# Patient Record
Sex: MALE | Race: ASIAN | NOT HISPANIC OR LATINO | URBAN - METROPOLITAN AREA
[De-identification: names, ages, dates, MRNs, and addresses within clinical notes are randomized per-mention and may not be internally consistent; named-entity substitution may affect disease eponyms.]

---
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
## 2020-01-22 PROCEDURE — 99214 OFFICE O/P EST MOD 30 MIN: CPT

## 2020-02-11 PROCEDURE — 99214 OFFICE O/P EST MOD 30 MIN: CPT

## 2020-03-19 PROCEDURE — 99214 OFFICE O/P EST MOD 30 MIN: CPT

## 2020-03-21 ENCOUNTER — INPATIENT (INPATIENT)
Facility: HOSPITAL | Age: 20
LOS: 11 days | Discharge: HOME | End: 2020-04-02
Attending: PSYCHIATRY & NEUROLOGY | Admitting: HOSPITALIST
Payer: MEDICAID

## 2020-03-21 VITALS
RESPIRATION RATE: 18 BRPM | HEART RATE: 81 BPM | DIASTOLIC BLOOD PRESSURE: 96 MMHG | SYSTOLIC BLOOD PRESSURE: 151 MMHG | OXYGEN SATURATION: 100 % | TEMPERATURE: 98 F

## 2020-03-21 DIAGNOSIS — F20.0 PARANOID SCHIZOPHRENIA: ICD-10-CM

## 2020-03-21 DIAGNOSIS — F48.9 NONPSYCHOTIC MENTAL DISORDER, UNSPECIFIED: ICD-10-CM

## 2020-03-21 LAB
ALBUMIN SERPL ELPH-MCNC: 5.3 G/DL — HIGH (ref 3.3–5)
ALP SERPL-CCNC: 51 U/L — LOW (ref 60–270)
ALT FLD-CCNC: 14 U/L — SIGNIFICANT CHANGE UP (ref 4–41)
ANION GAP SERPL CALC-SCNC: 17 MMO/L — HIGH (ref 7–14)
APAP SERPL-MCNC: < 15 UG/ML — LOW (ref 15–25)
AST SERPL-CCNC: 18 U/L — SIGNIFICANT CHANGE UP (ref 4–40)
BASOPHILS # BLD AUTO: 0.02 K/UL — SIGNIFICANT CHANGE UP (ref 0–0.2)
BASOPHILS NFR BLD AUTO: 0.3 % — SIGNIFICANT CHANGE UP (ref 0–2)
BILIRUB SERPL-MCNC: 0.9 MG/DL — SIGNIFICANT CHANGE UP (ref 0.2–1.2)
BUN SERPL-MCNC: 8 MG/DL — SIGNIFICANT CHANGE UP (ref 7–23)
CALCIUM SERPL-MCNC: 9.9 MG/DL — SIGNIFICANT CHANGE UP (ref 8.4–10.5)
CHLORIDE SERPL-SCNC: 97 MMOL/L — LOW (ref 98–107)
CO2 SERPL-SCNC: 22 MMOL/L — SIGNIFICANT CHANGE UP (ref 22–31)
CREAT SERPL-MCNC: 0.96 MG/DL — SIGNIFICANT CHANGE UP (ref 0.5–1.3)
EOSINOPHIL # BLD AUTO: 0.06 K/UL — SIGNIFICANT CHANGE UP (ref 0–0.5)
EOSINOPHIL NFR BLD AUTO: 0.8 % — SIGNIFICANT CHANGE UP (ref 0–6)
ETHANOL BLD-MCNC: < 10 MG/DL — SIGNIFICANT CHANGE UP
GLUCOSE SERPL-MCNC: 97 MG/DL — SIGNIFICANT CHANGE UP (ref 70–99)
HCT VFR BLD CALC: 49.9 % — SIGNIFICANT CHANGE UP (ref 39–50)
HGB BLD-MCNC: 17.3 G/DL — HIGH (ref 13–17)
IMM GRANULOCYTES NFR BLD AUTO: 0.3 % — SIGNIFICANT CHANGE UP (ref 0–1.5)
LYMPHOCYTES # BLD AUTO: 2.34 K/UL — SIGNIFICANT CHANGE UP (ref 1–3.3)
LYMPHOCYTES # BLD AUTO: 30.4 % — SIGNIFICANT CHANGE UP (ref 13–44)
MCHC RBC-ENTMCNC: 28.5 PG — SIGNIFICANT CHANGE UP (ref 27–34)
MCHC RBC-ENTMCNC: 34.7 % — SIGNIFICANT CHANGE UP (ref 32–36)
MCV RBC AUTO: 82.2 FL — SIGNIFICANT CHANGE UP (ref 80–100)
MONOCYTES # BLD AUTO: 0.73 K/UL — SIGNIFICANT CHANGE UP (ref 0–0.9)
MONOCYTES NFR BLD AUTO: 9.5 % — SIGNIFICANT CHANGE UP (ref 2–14)
NEUTROPHILS # BLD AUTO: 4.52 K/UL — SIGNIFICANT CHANGE UP (ref 1.8–7.4)
NEUTROPHILS NFR BLD AUTO: 58.7 % — SIGNIFICANT CHANGE UP (ref 43–77)
NRBC # FLD: 0 K/UL — SIGNIFICANT CHANGE UP (ref 0–0)
PLATELET # BLD AUTO: 213 K/UL — SIGNIFICANT CHANGE UP (ref 150–400)
PMV BLD: 10.9 FL — SIGNIFICANT CHANGE UP (ref 7–13)
POTASSIUM SERPL-MCNC: 3.7 MMOL/L — SIGNIFICANT CHANGE UP (ref 3.5–5.3)
POTASSIUM SERPL-SCNC: 3.7 MMOL/L — SIGNIFICANT CHANGE UP (ref 3.5–5.3)
PROT SERPL-MCNC: 8.2 G/DL — SIGNIFICANT CHANGE UP (ref 6–8.3)
RBC # BLD: 6.07 M/UL — HIGH (ref 4.2–5.8)
RBC # FLD: 11.8 % — SIGNIFICANT CHANGE UP (ref 10.3–14.5)
SALICYLATES SERPL-MCNC: < 5 MG/DL — LOW (ref 15–30)
SODIUM SERPL-SCNC: 136 MMOL/L — SIGNIFICANT CHANGE UP (ref 135–145)
TSH SERPL-MCNC: 2.58 UIU/ML — SIGNIFICANT CHANGE UP (ref 0.5–4.3)
WBC # BLD: 7.69 K/UL — SIGNIFICANT CHANGE UP (ref 3.8–10.5)
WBC # FLD AUTO: 7.69 K/UL — SIGNIFICANT CHANGE UP (ref 3.8–10.5)

## 2020-03-21 PROCEDURE — 99285 EMERGENCY DEPT VISIT HI MDM: CPT

## 2020-03-21 RX ORDER — ARIPIPRAZOLE 15 MG/1
15 TABLET ORAL DAILY
Refills: 0 | Status: DISCONTINUED | OUTPATIENT
Start: 2020-03-21 | End: 2020-03-24

## 2020-03-21 RX ORDER — LANOLIN ALCOHOL/MO/W.PET/CERES
3 CREAM (GRAM) TOPICAL AT BEDTIME
Refills: 0 | Status: DISCONTINUED | OUTPATIENT
Start: 2020-03-21 | End: 2020-03-25

## 2020-03-21 RX ORDER — CLONAZEPAM 1 MG
0.5 TABLET ORAL
Refills: 0 | Status: DISCONTINUED | OUTPATIENT
Start: 2020-03-21 | End: 2020-03-25

## 2020-03-21 RX ORDER — ARIPIPRAZOLE 15 MG/1
1 TABLET ORAL
Qty: 0 | Refills: 0 | DISCHARGE

## 2020-03-21 RX ADMIN — Medication 0.5 MILLIGRAM(S): at 16:13

## 2020-03-21 RX ADMIN — ARIPIPRAZOLE 15 MILLIGRAM(S): 15 TABLET ORAL at 16:13

## 2020-03-21 NOTE — ED PROVIDER NOTE - NS_EDPROVIDERDISPOUSERTYPE_ED_A_ED
JAZMYNE Santacruz at bedside for initial evaluation.
Pt discharged by provider.
I have personally evaluated and examined the patient. The Attending was available to me as a supervising provider if needed.

## 2020-03-21 NOTE — ED BEHAVIORAL HEALTH ASSESSMENT NOTE - ADDITIONAL DETAILS ALL
acute SI with command AH instructing patient to harm self via cutting throat or shooting self, no hx of SAs or SIB

## 2020-03-21 NOTE — ED ADULT NURSE NOTE - OBJECTIVE STATEMENT
Pt arrived to  c/o of having "bad thoughts". Pt reported that he has urges to harm himself but does not have a plan. Pt changed into  clothing. Personal belongings secured and logged. Evaluation pending.

## 2020-03-21 NOTE — ED BEHAVIORAL HEALTH ASSESSMENT NOTE - DESCRIPTION
Calm, cooperative, in no acute distress    Vital Signs Last 24 Hrs  T(C): 36.8 (21 Mar 2020 11:42), Max: 36.8 (21 Mar 2020 11:42)  T(F): 98.3 (21 Mar 2020 11:42), Max: 98.3 (21 Mar 2020 11:42)  HR: 81 (21 Mar 2020 11:42) (81 - 81)  BP: 151/96 (21 Mar 2020 11:42) (151/96 - 151/96)  BP(mean): --  RR: 18 (21 Mar 2020 11:42) (18 - 18)  SpO2: 100% (21 Mar 2020 11:42) (100% - 100%) hx of liver hemangioma  man, single, no dependents, domiciled at home with family (24 yo brother, 7 yo sister, mom, dad), currently unemployed Calm, cooperative, no agitation, no prns required    Vital Signs Last 24 Hrs  T(C): 36.8 (21 Mar 2020 11:42), Max: 36.8 (21 Mar 2020 11:42)  T(F): 98.3 (21 Mar 2020 11:42), Max: 98.3 (21 Mar 2020 11:42)  HR: 81 (21 Mar 2020 11:42) (81 - 81)  BP: 151/96 (21 Mar 2020 11:42) (151/96 - 151/96)  BP(mean): --  RR: 18 (21 Mar 2020 11:42) (18 - 18)  SpO2: 100% (21 Mar 2020 11:42) (100% - 100%)

## 2020-03-21 NOTE — ED BEHAVIORAL HEALTH ASSESSMENT NOTE - DETAILS
reports acute SI with states thoughts to cut throat with a knife or shoot self risperdal (hyperprolactinemia) depression in brother thyroid problems in mother "to hurt myself" as per patient JOVANY mother Dr. Sonja MANZO mother and EM, NP [place]elle[place]

## 2020-03-21 NOTE — ED BEHAVIORAL HEALTH ASSESSMENT NOTE - SUICIDE RISK FACTORS
Command hallucinations/Psychotic disorder current/past/Alcohol/Substance abuse disorders/Unable to engage in safety planning/Insomnia

## 2020-03-21 NOTE — ED BEHAVIORAL HEALTH ASSESSMENT NOTE - OTHER PAST PSYCHIATRIC HISTORY (INCLUDE DETAILS REGARDING ONSET, COURSE OF ILLNESS, INPATIENT/OUTPATIENT TREATMENT)
Diagnosis of schizophrenia with onset of symptoms in March 2019, in o/p treatment at [facility]. One psych hospitalization at [address] in 4/10-5/3/19. Diagnosis of schizophrenia with onset of symptoms in March 2019, in o/p treatment at [facility], sees a therapist and psychiatrist, Dr. Alonzo. One psych hospitalization at [address] in 4/10-5/3/19.

## 2020-03-21 NOTE — ED BEHAVIORAL HEALTH ASSESSMENT NOTE - SUMMARY
Patient with bizarre behavior and poor reality testing, paranoia. His behavior is affected significantly by his paranoid delusions. He has also made concerning statements about suicide. He requires psychiatric hospitalization for safety, stabilization, medication management as he is unable to care for self.    1. Psychiatric – 9.13 admission to [address] [place] [address]. Abilify 10mg daily, Ativan 2 mg PO q6h PRN agitation 2/2 psychiatric condition.   2. Medical – medically cleared    3. Substance – Follow up Utox, serum toxicology.   4. Therapy – group/milieu therapy. Patient with bizarre behavior and poor reality testing, paranoia. His behavior is affected significantly by his paranoid delusions. He has also made concerning statements about suicide. He requires psychiatric hospitalization for safety, stabilization, medication management as he is unable to care for self.    1. Psychiatric – 9.13 admission to [address] [place] [address]. increase Abilify to 15mg daily for psychosis, Clonazepam 0.5mg BID prn anxiety, Melatonin 3mg HS prn insomnia, Ativan 2 mg PO/I< q6h PRN agitation 2/2 psychiatric condition.   2. Medical – medically cleared    3. Substance – encourage abstinence from cannabis   4. Therapy – group/milieu therapy. 19 year old male, single, domiciled with family, currently unemployed; medical history of liver hemangiomas; psychiatric history significant for Schizophrenia, past psychiatric hospitalization from 4/10-5/3/19 at [address] [place] [address] and currently in outpatient treatment at [facility], no history of suicide attempts, no self-injurious behavior; substance history significant for current cannabis (THC) use - last use one week ago reported; no history of violence/legal problems/arrest was brought in by mother for bizarre behavior and suicidal ideation.      Patient was disorganized with vague speech and appeared suspicious on exam.  He admits to the onset of acute command AH instructing him to hurt himself and admits to active suicidal thoughts.  Patient does appear paranoid on exam and visibly fearful.  He makes gestures to shoot himself or cut his neck but denies intent to act on this.  Patient with negative symptoms present and command AH as stated, denies VH or TH. Patient with bizarre behavior and poor reality testing, paranoia.  His behavior is affected significantly by his paranoid delusions.  He has also made concerning statements about suicide.  He requires psychiatric hospitalization for safety, stabilization, medication management as he is unable to contract for safety in the community.  No indication for constant observation in a locked, supervised setting.    1. Psychiatric – 9.13 voluntary admission to [address] [place] [address]. increase Abilify to 15mg daily for psychosis, Clonazepam 0.5mg BID prn anxiety, Melatonin 3mg HS prn insomnia, Ativan 2 mg PO/I< q6h PRN agitation 2/2 psychiatric condition.   2. Medical – medically cleared    3. Substance – encourage abstinence from cannabis   4. Therapy – group/milieu therapy. 19 year old male, single, domiciled with family, currently unemployed; medical history of liver hemangiomas; psychiatric history significant for Schizophrenia, past psychiatric hospitalization from 4/10-5/3/19 at [address] [place] [address] and currently in outpatient treatment at [facility], no history of suicide attempts, no self-injurious behavior; substance history significant for current cannabis (THC) use - last use one week ago reported; no history of violence/legal problems/arrest was brought in by mother for evaluation due to concerns regarding depression.      Patient was disorganized with vague speech and appeared suspicious on exam.  He admits to the onset of acute command AH instructing him to hurt himself and admits to active suicidal thoughts.  Patient does appear paranoid on exam and visibly fearful.  He makes gestures to shoot himself or cut his neck but denies intent to act on this.  Patient with negative symptoms present and command AH as stated, denies VH or TH.  Patient with bizarre behavior and poor reality testing, paranoia.  His behavior is affected significantly by his paranoid delusions.  He has also made concerning statements about suicide.  He requires psychiatric hospitalization for safety, stabilization, medication management as he is unable to contract for safety in the community.  No indication for constant observation in a locked, supervised setting.    1. Psychiatric – 9.13 voluntary admission to [address] [place] [address]. increase Abilify to 15mg daily for psychosis, Clonazepam 0.5mg BID prn anxiety, Melatonin 3mg HS prn insomnia, Ativan 2 mg PO/I< q6h PRN agitation 2/2 psychiatric condition.   2. Medical – medically cleared    3. Substance – encourage abstinence from cannabis   4. Therapy – group/milieu therapy.
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
## 2020-03-21 NOTE — ED ADULT NURSE REASSESSMENT NOTE - NS ED NURSE REASSESS COMMENT FT1
pt calm & cooperative made aware of admission to [address] emotional reassurance provided safety & comfort measures maintained eval on going.

## 2020-03-21 NOTE — ED BEHAVIORAL HEALTH ASSESSMENT NOTE - DESCRIPTION (FIRST USE, LAST USE, QUANTITY, FREQUENCY, DURATION)
alcohol use in high school, no current use Of note, patient has been smoking cannabis since keily year of high school. He last smoked 1 week ago.

## 2020-03-21 NOTE — ED BEHAVIORAL HEALTH ASSESSMENT NOTE - RISK ASSESSMENT
Patient is judged to be an acute risk to self. He is at moderate acute violence risk. Risk factors include current psychotic episode, substance abuse, paranoia. Protective factors include no known history of arrest, violence, legal problems. He is at moderate acute suicide risk. Risk factors include suicidal statements made to family, substance abuse, current psychotic episode, poor insight into illness/behavior, global insomnia, family history of depression. Protective factors include no prior suicide attempts, no prior self-injurious behavior, supportive family, recent engagement with work/school. Patient is assessed to be an acute risk to self and would benefit from psychiatric hospitalization for safety, stabilization, medication management. Moderate Acute Suicide Risk Patient is judged to be an acute risk to self. He is at low acute violent risk. Risk factors include current psychotic episode, substance abuse, paranoia. Protective factors include no known history of arrest, violence, legal problems. He is at high acute suicide risk. Risk factors include suicidal statements made to family, acute SI with plan and CAH, substance abuse, current psychotic episode, poor insight into illness/behavior, global insomnia, family history of depression. Protective factors include no prior suicide attempts, no prior self-injurious behavior, supportive family, engagement with treatment and meds. Patient is assessed to be an acute risk to self and would benefit from psychiatric hospitalization for safety, stabilization, medication management. High Acute Suicide Risk

## 2020-03-21 NOTE — ED BEHAVIORAL HEALTH ASSESSMENT NOTE - DIFFERENTIAL
Psychosis NOS, r/o substance induced psychosis Psychosis NOS, r/o substance induced psychosis, cannabis abuse

## 2020-03-21 NOTE — ED ADULT TRIAGE NOTE - CHIEF COMPLAINT QUOTE
Patient reports SI, denies plan. Patient states "I keep repeating thoughts in my head over and over." Patient calm and cooperative in triage.  RN made aware.

## 2020-03-21 NOTE — ED BEHAVIORAL HEALTH ASSESSMENT NOTE - ACTIVATING EVENTS/STRESSORS
Triggering events leading to humiliation, shame, and/or despair (e.g. Loss of relationship, financial or health status) (real or anticipated)/Change in provider or treatment (i.e., medications, psychotherapy, milieu)

## 2020-03-21 NOTE — ED PROVIDER NOTE - OBJECTIVE STATEMENT
18 y/o M  hx Schizophrenia   BIBA secondary to feeling sad and  anxious . Admits to medication compliance.   Denies falling, punching or kicking any objects.   Explicitly denies SI/HI/AH/VH.   Denies pain, SOB, fever,  chills, chest/ abdominal discomfort. Denies recent use of alcohol or illicit drugs.  No evidence of physical injures, broken  skin or deformities. 18 y/o M  hx Schizophrenia   BIBA secondary to  "racing suicidal thoughts " . Admits to medication compliance.   Denies falling, punching or kicking any objects.  Denies HI/AH/VH.   Denies pain, SOB, fever,  chills, chest/ abdominal discomfort. Denies recent use of alcohol or illicit drugs.  No evidence of physical injures, broken  skin or deformities.

## 2020-03-21 NOTE — ED BEHAVIORAL HEALTH ASSESSMENT NOTE - THOUGHT PROCESS
Kinetics :  Vancomycin/ zosyn  Day 1    The patient was evaluated for vancomycin / zosyn therapy for worsening CXR.  Will restart the previously tolerated vancomycin regimen of 1gm q 12 hrs and zosyn as q8hr infusions over 4 hrs.  Plan to continue to follow with you.   Illogical/Impaired reasoning/Disorganized/Other

## 2020-03-21 NOTE — ED BEHAVIORAL HEALTH ASSESSMENT NOTE - CURRENT MEDICATION
abilify 10mg daily, clonazepam 0.5mg daily prn abilify 10mg daily, clonazepam 0.5mg daily prn (last dispensed 12/24/19 for #30 pills as per [employer] database)

## 2020-03-21 NOTE — ED BEHAVIORAL HEALTH ASSESSMENT NOTE - NS ED BHA MSE SPEECH VOLUME
I have reviewed and confirmed nurses' notes for patient's medications, allergies, medical history, and surgical history.
Normal

## 2020-03-21 NOTE — ED ADULT NURSE NOTE - CCCP TRG CHIEF CMPLNT
----- Message from Lauryn Jones sent at 2017  1:56 PM CDT -----  Regarding: DENIAL OF MRI / PEER TO PEER REQUIRED - PLS READ MESSAGE  [facility] DENIED THE MRI RIGHT KNEE DUE PATIENT NOT HAVING 6 WEEKS OF PHYSICAL THERAPY.    DOCTOR CAN EITHER DO A PEER TO PEER (DETAILS BELOW) OR PUT ORDER THRU FOR PHYSICAL THERAPY AND WE CAN RESUBMIT TO INSURANCE AFTER THERAPY IF NEED BE. THE DOCTORS OFFICE WILL NEED TO LET THE PATIENT KNOW MRI WAS DENIED AND PHYSICAL THERAPY HAS BEEN ORDERED PER [employer] INSURANCE GUIDELINES.    [id number] CAN BE DONE BY [phone]  ENTER \"94\" FOR [place], THEN \"1\" -  YOU WILL THEN BE CONNECTED TO A PERSON. MEMBER ID: [id number]  THIS PEER TO PEER MUST BE COMPLETED BY END OF DAY, .    LET ME KNOW OUTCOME OF PEER TO PEER OR IF DOCTOR IS DECIDING TO SEND PATIENT TO PHYSICAL THERAPY. THANK YOU.      
Call to patient-  States he hasn't tried physical therapy for his knee.   Is aware that insurance is requiring 6 weeks of PT for the knee before they cover the MRI.   Patient states this is a new insurance for him and he would like to hold off on the PT for now.    Will make provider aware.  
Has patient tried PT in the past for his knee? If not his insurance requires 6 weeks of PT for the knee before they cover the MRI.  If interested please place referral for PT.  We can perform the MRI if concerns are still present after completion of PT.    
suicidal thoughts

## 2020-03-22 PROCEDURE — 99222 1ST HOSP IP/OBS MODERATE 55: CPT

## 2020-03-22 RX ADMIN — ARIPIPRAZOLE 15 MILLIGRAM(S): 15 TABLET ORAL at 09:47

## 2020-03-22 RX ADMIN — Medication 0.5 MILLIGRAM(S): at 04:38

## 2020-03-23 PROCEDURE — 99232 SBSQ HOSP IP/OBS MODERATE 35: CPT

## 2020-03-23 RX ORDER — SENNA PLUS 8.6 MG/1
1 TABLET ORAL AT BEDTIME
Refills: 0 | Status: DISCONTINUED | OUTPATIENT
Start: 2020-03-23 | End: 2020-03-25

## 2020-03-23 RX ADMIN — SENNA PLUS 1 TABLET(S): 8.6 TABLET ORAL at 21:37

## 2020-03-23 RX ADMIN — ARIPIPRAZOLE 15 MILLIGRAM(S): 15 TABLET ORAL at 08:23

## 2020-03-24 LAB
CHOLEST SERPL-MCNC: 128 MG/DL — SIGNIFICANT CHANGE UP (ref 120–199)
HBA1C BLD-MCNC: 5.1 % — SIGNIFICANT CHANGE UP (ref 4–5.6)
HDLC SERPL-MCNC: 40 MG/DL — SIGNIFICANT CHANGE UP (ref 35–55)
LIPID PNL WITH DIRECT LDL SERPL: 82 MG/DL — SIGNIFICANT CHANGE UP
TRIGL SERPL-MCNC: 51 MG/DL — SIGNIFICANT CHANGE UP (ref 10–149)

## 2020-03-24 PROCEDURE — 99232 SBSQ HOSP IP/OBS MODERATE 35: CPT

## 2020-03-24 RX ORDER — ARIPIPRAZOLE 15 MG/1
20 TABLET ORAL DAILY
Refills: 0 | Status: DISCONTINUED | OUTPATIENT
Start: 2020-03-24 | End: 2020-04-02

## 2020-03-24 RX ADMIN — Medication 3 MILLIGRAM(S): at 21:32

## 2020-03-24 RX ADMIN — SENNA PLUS 1 TABLET(S): 8.6 TABLET ORAL at 21:32

## 2020-03-24 RX ADMIN — ARIPIPRAZOLE 15 MILLIGRAM(S): 15 TABLET ORAL at 09:23

## 2020-03-25 PROCEDURE — 99232 SBSQ HOSP IP/OBS MODERATE 35: CPT

## 2020-03-25 RX ORDER — LANOLIN ALCOHOL/MO/W.PET/CERES
5 CREAM (GRAM) TOPICAL AT BEDTIME
Refills: 0 | Status: DISCONTINUED | OUTPATIENT
Start: 2020-03-25 | End: 2020-04-02

## 2020-03-25 RX ORDER — CLONAZEPAM 1 MG
0.5 TABLET ORAL
Refills: 0 | Status: DISCONTINUED | OUTPATIENT
Start: 2020-03-25 | End: 2020-04-01

## 2020-03-25 RX ORDER — MAGNESIUM HYDROXIDE 400 MG/1
30 TABLET, CHEWABLE ORAL
Refills: 0 | Status: DISCONTINUED | OUTPATIENT
Start: 2020-03-25 | End: 2020-04-02

## 2020-03-25 RX ORDER — SENNA PLUS 8.6 MG/1
2 TABLET ORAL AT BEDTIME
Refills: 0 | Status: DISCONTINUED | OUTPATIENT
Start: 2020-03-25 | End: 2020-04-02

## 2020-03-25 RX ADMIN — ARIPIPRAZOLE 20 MILLIGRAM(S): 15 TABLET ORAL at 09:13

## 2020-03-25 RX ADMIN — Medication 5 MILLIGRAM(S): at 21:47

## 2020-03-25 RX ADMIN — SENNA PLUS 2 TABLET(S): 8.6 TABLET ORAL at 21:47

## 2020-03-25 RX ADMIN — MAGNESIUM HYDROXIDE 30 MILLILITER(S): 400 TABLET, CHEWABLE ORAL at 12:34

## 2020-03-26 PROCEDURE — 99232 SBSQ HOSP IP/OBS MODERATE 35: CPT

## 2020-03-26 RX ORDER — BENZTROPINE MESYLATE 1 MG
1 TABLET ORAL ONCE
Refills: 0 | Status: COMPLETED | OUTPATIENT
Start: 2020-03-26 | End: 2020-03-26

## 2020-03-26 RX ORDER — LANOLIN ALCOHOL/MO/W.PET/CERES
3 CREAM (GRAM) TOPICAL ONCE
Refills: 0 | Status: COMPLETED | OUTPATIENT
Start: 2020-03-26 | End: 2020-03-26

## 2020-03-26 RX ADMIN — Medication 3 MILLIGRAM(S): at 01:58

## 2020-03-26 RX ADMIN — Medication 1 MILLIGRAM(S): at 21:18

## 2020-03-26 RX ADMIN — Medication 5 MILLIGRAM(S): at 21:18

## 2020-03-26 RX ADMIN — SENNA PLUS 2 TABLET(S): 8.6 TABLET ORAL at 21:18

## 2020-03-26 RX ADMIN — Medication 5 MILLIGRAM(S): at 12:56

## 2020-03-26 RX ADMIN — ARIPIPRAZOLE 20 MILLIGRAM(S): 15 TABLET ORAL at 12:07

## 2020-03-27 RX ORDER — TRAZODONE HCL 50 MG
50 TABLET ORAL AT BEDTIME
Refills: 0 | Status: DISCONTINUED | OUTPATIENT
Start: 2020-03-27 | End: 2020-04-02

## 2020-03-27 RX ORDER — DIPHENHYDRAMINE HCL 50 MG
50 CAPSULE ORAL ONCE
Refills: 0 | Status: DISCONTINUED | OUTPATIENT
Start: 2020-03-27 | End: 2020-04-02

## 2020-03-27 RX ORDER — ARIPIPRAZOLE 15 MG/1
1 TABLET ORAL
Qty: 30 | Refills: 0
Start: 2020-03-27 | End: 2020-04-25

## 2020-03-27 RX ORDER — HALOPERIDOL DECANOATE 100 MG/ML
5 INJECTION INTRAMUSCULAR ONCE
Refills: 0 | Status: DISCONTINUED | OUTPATIENT
Start: 2020-03-27 | End: 2020-04-02

## 2020-03-27 RX ORDER — HALOPERIDOL DECANOATE 100 MG/ML
5 INJECTION INTRAMUSCULAR EVERY 6 HOURS
Refills: 0 | Status: DISCONTINUED | OUTPATIENT
Start: 2020-03-27 | End: 2020-04-02

## 2020-03-27 RX ORDER — BENZTROPINE MESYLATE 1 MG
1 TABLET ORAL
Refills: 0 | Status: DISCONTINUED | OUTPATIENT
Start: 2020-03-27 | End: 2020-04-02

## 2020-03-27 RX ORDER — DIPHENHYDRAMINE HCL 50 MG
50 CAPSULE ORAL EVERY 4 HOURS
Refills: 0 | Status: DISCONTINUED | OUTPATIENT
Start: 2020-03-27 | End: 2020-04-02

## 2020-03-27 RX ADMIN — HALOPERIDOL DECANOATE 5 MILLIGRAM(S): 100 INJECTION INTRAMUSCULAR at 15:23

## 2020-03-27 RX ADMIN — ARIPIPRAZOLE 20 MILLIGRAM(S): 15 TABLET ORAL at 08:58

## 2020-03-27 RX ADMIN — Medication 50 MILLIGRAM(S): at 15:23

## 2020-03-27 RX ADMIN — Medication 2 MILLIGRAM(S): at 15:23

## 2020-03-27 RX ADMIN — Medication 50 MILLIGRAM(S): at 01:20

## 2020-03-28 RX ADMIN — ARIPIPRAZOLE 20 MILLIGRAM(S): 15 TABLET ORAL at 09:13

## 2020-03-28 RX ADMIN — SENNA PLUS 2 TABLET(S): 8.6 TABLET ORAL at 21:41

## 2020-03-28 RX ADMIN — Medication 5 MILLIGRAM(S): at 21:41

## 2020-03-29 RX ADMIN — ARIPIPRAZOLE 20 MILLIGRAM(S): 15 TABLET ORAL at 09:17

## 2020-03-29 RX ADMIN — HALOPERIDOL DECANOATE 5 MILLIGRAM(S): 100 INJECTION INTRAMUSCULAR at 14:59

## 2020-03-29 RX ADMIN — Medication 50 MILLIGRAM(S): at 03:35

## 2020-03-29 RX ADMIN — Medication 5 MILLIGRAM(S): at 21:20

## 2020-03-29 RX ADMIN — SENNA PLUS 2 TABLET(S): 8.6 TABLET ORAL at 21:20

## 2020-03-29 RX ADMIN — Medication 2 MILLIGRAM(S): at 14:59

## 2020-03-30 PROCEDURE — 99232 SBSQ HOSP IP/OBS MODERATE 35: CPT

## 2020-03-30 RX ADMIN — SENNA PLUS 2 TABLET(S): 8.6 TABLET ORAL at 20:47

## 2020-03-30 RX ADMIN — ARIPIPRAZOLE 20 MILLIGRAM(S): 15 TABLET ORAL at 09:02

## 2020-03-30 RX ADMIN — Medication 5 MILLIGRAM(S): at 20:47

## 2020-03-31 PROCEDURE — 99231 SBSQ HOSP IP/OBS SF/LOW 25: CPT

## 2020-03-31 RX ORDER — ACETAMINOPHEN 500 MG
650 TABLET ORAL EVERY 6 HOURS
Refills: 0 | Status: DISCONTINUED | OUTPATIENT
Start: 2020-03-31 | End: 2020-04-02

## 2020-03-31 RX ADMIN — SENNA PLUS 2 TABLET(S): 8.6 TABLET ORAL at 21:51

## 2020-03-31 RX ADMIN — Medication 650 MILLIGRAM(S): at 12:04

## 2020-03-31 RX ADMIN — Medication 5 MILLIGRAM(S): at 21:51

## 2020-03-31 RX ADMIN — ARIPIPRAZOLE 20 MILLIGRAM(S): 15 TABLET ORAL at 08:24

## 2020-04-01 RX ORDER — CLONAZEPAM 1 MG
0.5 TABLET ORAL
Refills: 0 | Status: DISCONTINUED | OUTPATIENT
Start: 2020-04-01 | End: 2020-04-02

## 2020-04-01 RX ORDER — LANOLIN ALCOHOL/MO/W.PET/CERES
1 CREAM (GRAM) TOPICAL
Qty: 30 | Refills: 0
Start: 2020-04-01 | End: 2020-04-30

## 2020-04-01 RX ORDER — ARIPIPRAZOLE 15 MG/1
1 TABLET ORAL
Qty: 30 | Refills: 0
Start: 2020-04-01 | End: 2020-04-30

## 2020-04-01 RX ORDER — SENNA PLUS 8.6 MG/1
2 TABLET ORAL
Qty: 60 | Refills: 0
Start: 2020-04-01 | End: 2020-04-30

## 2020-04-01 RX ADMIN — Medication 5 MILLIGRAM(S): at 20:35

## 2020-04-01 RX ADMIN — SENNA PLUS 2 TABLET(S): 8.6 TABLET ORAL at 20:35

## 2020-04-02 VITALS — TEMPERATURE: 97 F | SYSTOLIC BLOOD PRESSURE: 136 MMHG | HEART RATE: 87 BPM | DIASTOLIC BLOOD PRESSURE: 85 MMHG

## 2020-04-02 PROCEDURE — 99239 HOSP IP/OBS DSCHRG MGMT >30: CPT

## 2020-04-02 RX ADMIN — ARIPIPRAZOLE 20 MILLIGRAM(S): 15 TABLET ORAL at 08:45

## 2020-04-09 PROCEDURE — 99214 OFFICE O/P EST MOD 30 MIN: CPT

## 2020-04-21 ENCOUNTER — EMERGENCY (EMERGENCY)
Facility: HOSPITAL | Age: 20
LOS: 1 days | Discharge: ANOTHER HOSPITAL | End: 2020-04-21
Admitting: STUDENT IN AN ORGANIZED HEALTH CARE EDUCATION/TRAINING PROGRAM
Payer: MEDICAID

## 2020-04-21 VITALS
RESPIRATION RATE: 18 BRPM | HEART RATE: 100 BPM | TEMPERATURE: 98 F | OXYGEN SATURATION: 100 % | DIASTOLIC BLOOD PRESSURE: 98 MMHG | SYSTOLIC BLOOD PRESSURE: 147 MMHG

## 2020-04-21 VITALS
HEART RATE: 117 BPM | DIASTOLIC BLOOD PRESSURE: 98 MMHG | TEMPERATURE: 98 F | RESPIRATION RATE: 20 BRPM | OXYGEN SATURATION: 99 % | SYSTOLIC BLOOD PRESSURE: 143 MMHG

## 2020-04-21 DIAGNOSIS — F48.9 NONPSYCHOTIC MENTAL DISORDER, UNSPECIFIED: ICD-10-CM

## 2020-04-21 DIAGNOSIS — F20.0 PARANOID SCHIZOPHRENIA: ICD-10-CM

## 2020-04-21 LAB
ALBUMIN SERPL ELPH-MCNC: 5.5 G/DL — HIGH (ref 3.3–5)
ALP SERPL-CCNC: 58 U/L — LOW (ref 60–270)
ALT FLD-CCNC: 109 U/L — HIGH (ref 4–41)
ANION GAP SERPL CALC-SCNC: 17 MMO/L — HIGH (ref 7–14)
APAP SERPL-MCNC: < 15 UG/ML — LOW (ref 15–25)
AST SERPL-CCNC: 32 U/L — SIGNIFICANT CHANGE UP (ref 4–40)
BASOPHILS # BLD AUTO: 0.02 K/UL — SIGNIFICANT CHANGE UP (ref 0–0.2)
BASOPHILS NFR BLD AUTO: 0.2 % — SIGNIFICANT CHANGE UP (ref 0–2)
BILIRUB SERPL-MCNC: 1 MG/DL — SIGNIFICANT CHANGE UP (ref 0.2–1.2)
BUN SERPL-MCNC: 11 MG/DL — SIGNIFICANT CHANGE UP (ref 7–23)
CALCIUM SERPL-MCNC: 10.2 MG/DL — SIGNIFICANT CHANGE UP (ref 8.4–10.5)
CHLORIDE SERPL-SCNC: 96 MMOL/L — LOW (ref 98–107)
CO2 SERPL-SCNC: 25 MMOL/L — SIGNIFICANT CHANGE UP (ref 22–31)
CREAT SERPL-MCNC: 0.98 MG/DL — SIGNIFICANT CHANGE UP (ref 0.5–1.3)
EOSINOPHIL # BLD AUTO: 0.07 K/UL — SIGNIFICANT CHANGE UP (ref 0–0.5)
EOSINOPHIL NFR BLD AUTO: 0.7 % — SIGNIFICANT CHANGE UP (ref 0–6)
ETHANOL BLD-MCNC: < 10 MG/DL — SIGNIFICANT CHANGE UP
GLUCOSE SERPL-MCNC: 100 MG/DL — HIGH (ref 70–99)
HCT VFR BLD CALC: 50.3 % — HIGH (ref 39–50)
HGB BLD-MCNC: 17 G/DL — SIGNIFICANT CHANGE UP (ref 13–17)
IMM GRANULOCYTES NFR BLD AUTO: 0.4 % — SIGNIFICANT CHANGE UP (ref 0–1.5)
LYMPHOCYTES # BLD AUTO: 1.9 K/UL — SIGNIFICANT CHANGE UP (ref 1–3.3)
LYMPHOCYTES # BLD AUTO: 20.1 % — SIGNIFICANT CHANGE UP (ref 13–44)
MCHC RBC-ENTMCNC: 28.8 PG — SIGNIFICANT CHANGE UP (ref 27–34)
MCHC RBC-ENTMCNC: 33.8 % — SIGNIFICANT CHANGE UP (ref 32–36)
MCV RBC AUTO: 85.1 FL — SIGNIFICANT CHANGE UP (ref 80–100)
MONOCYTES # BLD AUTO: 1.04 K/UL — HIGH (ref 0–0.9)
MONOCYTES NFR BLD AUTO: 11 % — SIGNIFICANT CHANGE UP (ref 2–14)
NEUTROPHILS # BLD AUTO: 6.36 K/UL — SIGNIFICANT CHANGE UP (ref 1.8–7.4)
NEUTROPHILS NFR BLD AUTO: 67.6 % — SIGNIFICANT CHANGE UP (ref 43–77)
NRBC # FLD: 0 K/UL — SIGNIFICANT CHANGE UP (ref 0–0)
PLATELET # BLD AUTO: 172 K/UL — SIGNIFICANT CHANGE UP (ref 150–400)
PMV BLD: 11.4 FL — SIGNIFICANT CHANGE UP (ref 7–13)
POTASSIUM SERPL-MCNC: 3.8 MMOL/L — SIGNIFICANT CHANGE UP (ref 3.5–5.3)
POTASSIUM SERPL-SCNC: 3.8 MMOL/L — SIGNIFICANT CHANGE UP (ref 3.5–5.3)
PROT SERPL-MCNC: 8.3 G/DL — SIGNIFICANT CHANGE UP (ref 6–8.3)
RBC # BLD: 5.91 M/UL — HIGH (ref 4.2–5.8)
RBC # FLD: 12.7 % — SIGNIFICANT CHANGE UP (ref 10.3–14.5)
SALICYLATES SERPL-MCNC: < 5 MG/DL — LOW (ref 15–30)
SODIUM SERPL-SCNC: 138 MMOL/L — SIGNIFICANT CHANGE UP (ref 135–145)
TSH SERPL-MCNC: 5.18 UIU/ML — HIGH (ref 0.5–4.3)
WBC # BLD: 9.43 K/UL — SIGNIFICANT CHANGE UP (ref 3.8–10.5)
WBC # FLD AUTO: 9.43 K/UL — SIGNIFICANT CHANGE UP (ref 3.8–10.5)

## 2020-04-21 PROCEDURE — 93010 ELECTROCARDIOGRAM REPORT: CPT

## 2020-04-21 PROCEDURE — 99285 EMERGENCY DEPT VISIT HI MDM: CPT | Mod: GC

## 2020-04-21 PROCEDURE — 99284 EMERGENCY DEPT VISIT MOD MDM: CPT | Mod: 25

## 2020-04-21 NOTE — ED BEHAVIORAL HEALTH ASSESSMENT NOTE - MODIFICATIONS
I have seen and examined the patient with Dr KATHRYN Vergara and performed sears elements of the History and Mental Status Examination.  I agree with her assessment and recommendations.
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
## 2020-04-21 NOTE — ED PROVIDER NOTE - NS ED ROS FT
ROS  	•	CONSTITUTIONAL - no fever, no diaphoresis, no weight change  	•	EYES - no eye pain, no blurred vision  	•	ENT - no change in hearing, no change in taste in smell and taste, no pain  	•	RESPIRATORY - no shortness of breath, no cough  	•	CARDIAC - no chest pain, no palpitations  	•	GI - no abd pain, no nausea, no vomiting, no diarrhea, no constipation, no bleeding  	•	MUSCULOSKELETAL - no joint paint, no swelling, no redness  	•	NEUROLOGIC - no weakness, no headache, no anesthesia, no paresthesias  	•	PSYCH - + anxiety, + acting out behaviour, +agitation, no suicidal, no homicidal, no hallucination, no depression
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
## 2020-04-21 NOTE — ED BEHAVIORAL HEALTH ASSESSMENT NOTE - PSYCHIATRIC ISSUES AND PLAN (INCLUDE STANDING AND PRN MEDICATION)
schizophrenia- abilify 15 mg po qd, Cogentin 0.5 mg po BID, Haldol 5 mg po/im q6h prn agitation, Benadryl 50 mg po po/IM q6h prn eps prophylaxis, Ativan 2 mg po /IM q6h prn agitation schizophrenia- abilify 10 mg po qd, Cogentin 0.5 mg po BID, Clonazepam 0.5 mg po prn daily, Zyprexa 5 mg po/IMq6h prn agitation, Ativan 2 mg po /IM q6h prn agitation. DO NOT ADMINISTER IM ZYPREXA WITH IM ATIVAN. MUST WAIT 3 HOURS.

## 2020-04-21 NOTE — ED BEHAVIORAL HEALTH NOTE - BEHAVIORAL HEALTH NOTE
SW contacted [facility] and spoke to Vania ([phone]) to reserve a bed for pt. Provided information for case to be reviewed. ERIBERTO contacted [facility] and spoke to Vania ([phone]) to reserve a bed for pt. Provided information for case to be reviewed. ERIBERTO faxed EKG to [facility] ([phone]). ERIBERTO contacted [facility] and spoke to Vania ([phone]) to reserve a bed for pt. Provided information for case to be reviewed.     EIRBERTO faxed EKG to [facility] ([phone]). ERIBERTO contacted [facility] and spoke to Vania ([phone]) to reserve a bed for pt. Provided information for case to be reviewed.     ERIBERTO faxed EKG/LEGALS to [facility] ([phone]).

## 2020-04-21 NOTE — ED BEHAVIORAL HEALTH ASSESSMENT NOTE - ACTIVATING EVENTS/STRESSORS
Recent inpatient discharge/Change in provider or treatment (i.e., medications, psychotherapy, milieu)/Other
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
## 2020-04-21 NOTE — ED BEHAVIORAL HEALTH ASSESSMENT NOTE - DESCRIPTION
cooperative with interview\  Vital Signs Last 24 Hrs  T(C): 36.7 (21 Apr 2020 20:05), Max: 36.7 (21 Apr 2020 20:05)  T(F): 98 (21 Apr 2020 20:05), Max: 98 (21 Apr 2020 20:05)  HR: 117 (21 Apr 2020 20:05) (117 - 117)  BP: 143/98 (21 Apr 2020 20:05) (143/98 - 143/98)  BP(mean): --  RR: 20 (21 Apr 2020 20:05) (20 - 20)  SpO2: 99% (21 Apr 2020 20:05) (99% - 99%) liver hemangioma, seasonal allergies dropped out of [employer] college, lives at home with family, unemployed
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
## 2020-04-21 NOTE — ED BEHAVIORAL HEALTH ASSESSMENT NOTE - VIOLENCE RISK FACTORS:
Feeling of being under threat and being unable to control threat/Lack of insight into violence risk/need for treatment/Noncompliance with treatment

## 2020-04-21 NOTE — ED PROVIDER NOTE - CLINICAL SUMMARY MEDICAL DECISION MAKING FREE TEXT BOX
This is a 19 yr old M, pmh schizophrenia c/o acting out aggressive behaviour towards family at home and medication non compliance for weeks. UA /tox  Collateral info by SW from family. This is a 19 yr old M, pmh schizophrenia c/o acting out aggressive behaviour towards family at home and medication non compliance for weeks. UA /tox  Collateral info by SW from family. Labs, ua tox serum- wnl, ekg- nsr psych consult- recommendation inpatient treatment.

## 2020-04-21 NOTE — ED PROVIDER NOTE - CARE PLAN
Principal Discharge DX:	Schizophrenia Principal Discharge DX:	Paranoid schizophrenia  Secondary Diagnosis:	Deferred condition on axis II
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
## 2020-04-21 NOTE — ED BEHAVIORAL HEALTH ASSESSMENT NOTE - RISK ASSESSMENT
Risk factors include single, male, unemployed, acute psychosis, med noncompliant, anxiety, paranoia, substance abuse, history of suicidal ideation. Protective factors include denies SI/HI/I/P, no history of suicide attempt, stability of domicile, supportive family, and future orientation. Given the above, patient is not felt to be at imminent risk of suicide. Low Acute Suicide Risk

## 2020-04-21 NOTE — ED BEHAVIORAL HEALTH NOTE - BEHAVIORAL HEALTH NOTE
Writer received a return call from Vania of [facility] who reported that pt’s insurance was ran and came up as payment rejection. Writer contacted [facility] Registration told pt’s insurance is under name of Rush Garcia.     Writer also spoke with mother to confirm active insurance. Mother reports insurance new just became active on 3/1/20 and that pt name sometimes in different order. Mother informed of potential transfer to [facility]. Mother then reported preferred diet as Halal meat if available and vegetarian diet if not.   Vania able to confirm active insurance. EKG faxed.  Assessment completed and to be reviewed.
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
## 2020-04-22 PROBLEM — F20.9 SCHIZOPHRENIA, UNSPECIFIED: Chronic | Status: ACTIVE | Noted: 2020-03-21

## 2020-04-22 LAB
T3 SERPL-MCNC: 150.8 NG/DL — SIGNIFICANT CHANGE UP (ref 80–200)
T4 AB SER-ACNC: 10.56 UG/DL — SIGNIFICANT CHANGE UP (ref 5.1–13)

## 2020-04-22 NOTE — ED BEHAVIORAL HEALTH NOTE - BEHAVIORAL HEALTH NOTE
Pt accepted for transfer to [facility] ([phone]) by Dr. Dan. Writer contacted [employer] at [phone] for auth. Writer spoke with Noemi ROBINS who provided the following pre-auth information.    Pre auth#[id number]  Good until 4/22/20 at 5pm  Concurrent reviewer SERGEY and will outreach [facility] UR contact  EMAIL SENT TO [facility]   Pt's mother informed of transfer information

## 2020-04-22 NOTE — ED ADULT NURSE REASSESSMENT NOTE - NS ED NURSE REASSESS COMMENT FT1
Pt currently occupying  rm 2. Pt is calm and cooperative but paces occasionally. Pt was provided with a pitcher of water and orange juice which was well tolerated. Pt currently denies SI/HI/AH/VH. Current plan is to admit pt to inpatient psych at [facility]. Evaluation ongoing.
Received pt from RN break coverage, awake, pacing hallway. NAD, VSS. Pt transferred at this time with ems to [facility] for involuntary admission.

## 2020-05-04 PROCEDURE — 99442: CPT

## 2020-05-11 PROCEDURE — 99442: CPT

## 2020-06-05 PROCEDURE — ZZZZZ: CPT

## 2020-07-22 PROCEDURE — ZZZZZ: CPT

## 2020-08-26 PROCEDURE — ZZZZZ: CPT

## 2020-10-07 PROCEDURE — ZZZZZ: CPT

## 2020-11-18 PROCEDURE — ZZZZZ: CPT

## 2020-12-21 PROCEDURE — ZZZZZ: CPT

## 2021-01-08 PROCEDURE — 90834 PSYTX W PT 45 MINUTES: CPT | Mod: 95

## 2021-01-15 PROCEDURE — 90834 PSYTX W PT 45 MINUTES: CPT | Mod: 95

## 2021-01-29 PROCEDURE — 90834 PSYTX W PT 45 MINUTES: CPT | Mod: 95

## 2021-02-05 PROCEDURE — ZZZZZ: CPT

## 2021-02-08 PROCEDURE — ZZZZZ: CPT

## 2021-02-19 PROCEDURE — 90870 ELECTROCONVULSIVE THERAPY: CPT

## 2021-02-26 PROCEDURE — 90834 PSYTX W PT 45 MINUTES: CPT | Mod: 95

## 2021-03-05 PROCEDURE — 90834 PSYTX W PT 45 MINUTES: CPT | Mod: 95

## 2021-03-08 PROCEDURE — 99214 OFFICE O/P EST MOD 30 MIN: CPT | Mod: 95

## 2021-03-26 PROCEDURE — 90834 PSYTX W PT 45 MINUTES: CPT | Mod: 95

## 2021-04-02 ENCOUNTER — TRANSCRIPTION ENCOUNTER (OUTPATIENT)
Age: 21
End: 2021-04-02

## 2021-04-02 LAB
AMPHET UR-MCNC: NEGATIVE — SIGNIFICANT CHANGE UP
BARBITURATES UR SCN-MCNC: NEGATIVE — SIGNIFICANT CHANGE UP
BENZODIAZ UR-MCNC: NEGATIVE — SIGNIFICANT CHANGE UP
COCAINE METAB.OTHER UR-MCNC: NEGATIVE — SIGNIFICANT CHANGE UP
CREATININE URINE RESULT, DAU: 332 MG/DL — SIGNIFICANT CHANGE UP
METHADONE UR-MCNC: NEGATIVE — SIGNIFICANT CHANGE UP
OPIATES UR-MCNC: NEGATIVE — SIGNIFICANT CHANGE UP
OXYCODONE UR-MCNC: NEGATIVE — SIGNIFICANT CHANGE UP
PCP SPEC-MCNC: SIGNIFICANT CHANGE UP
PCP UR-MCNC: NEGATIVE — SIGNIFICANT CHANGE UP
THC UR QL: POSITIVE

## 2021-04-08 PROCEDURE — ZZZZZ: CPT

## 2021-04-12 PROCEDURE — 99214 OFFICE O/P EST MOD 30 MIN: CPT | Mod: 95

## 2021-04-23 PROCEDURE — 90834 PSYTX W PT 45 MINUTES: CPT | Mod: 95

## 2021-04-30 PROCEDURE — 90834 PSYTX W PT 45 MINUTES: CPT | Mod: 95

## 2021-05-14 PROCEDURE — 90834 PSYTX W PT 45 MINUTES: CPT

## 2021-05-21 PROCEDURE — 90832 PSYTX W PT 30 MINUTES: CPT | Mod: 95

## 2021-05-28 PROCEDURE — 90832 PSYTX W PT 30 MINUTES: CPT | Mod: 95

## 2021-06-11 PROCEDURE — 90834 PSYTX W PT 45 MINUTES: CPT | Mod: 95

## 2021-06-18 PROCEDURE — 90832 PSYTX W PT 30 MINUTES: CPT | Mod: 95

## 2021-06-25 PROCEDURE — 90837 PSYTX W PT 60 MINUTES: CPT | Mod: 95

## 2021-07-01 ENCOUNTER — OUTPATIENT (OUTPATIENT)
Dept: OUTPATIENT SERVICES | Facility: HOSPITAL | Age: 21
LOS: 1 days | End: 2021-07-01

## 2021-07-01 ENCOUNTER — OUTPATIENT (OUTPATIENT)
Dept: OUTPATIENT SERVICES | Facility: HOSPITAL | Age: 21
LOS: 1 days | End: 2021-07-01
Payer: MEDICAID

## 2021-07-02 ENCOUNTER — INPATIENT (INPATIENT)
Facility: HOSPITAL | Age: 21
LOS: 26 days | Discharge: HOME | End: 2021-07-29
Attending: PSYCHIATRY & NEUROLOGY | Admitting: PSYCHIATRY & NEUROLOGY
Payer: MEDICAID

## 2021-07-02 VITALS
TEMPERATURE: 100 F | RESPIRATION RATE: 16 BRPM | OXYGEN SATURATION: 100 % | HEIGHT: 71 IN | DIASTOLIC BLOOD PRESSURE: 105 MMHG | SYSTOLIC BLOOD PRESSURE: 160 MMHG | HEART RATE: 100 BPM

## 2021-07-02 DIAGNOSIS — R45.851 SUICIDAL IDEATIONS: ICD-10-CM

## 2021-07-02 PROBLEM — F20.9 SCHIZOPHRENIA, UNSPECIFIED: Chronic | Status: ACTIVE | Noted: 2020-04-21

## 2021-07-02 LAB
ALBUMIN SERPL ELPH-MCNC: 5 G/DL — SIGNIFICANT CHANGE UP (ref 3.3–5)
ALP SERPL-CCNC: 58 U/L — SIGNIFICANT CHANGE UP (ref 40–120)
ALT FLD-CCNC: 21 U/L — SIGNIFICANT CHANGE UP (ref 4–41)
ANION GAP SERPL CALC-SCNC: 15 MMOL/L — HIGH (ref 7–14)
APAP SERPL-MCNC: <15 UG/ML — SIGNIFICANT CHANGE UP (ref 15–25)
AST SERPL-CCNC: 17 U/L — SIGNIFICANT CHANGE UP (ref 4–40)
B PERT DNA SPEC QL NAA+PROBE: SIGNIFICANT CHANGE UP
BASOPHILS # BLD AUTO: 0.02 K/UL — SIGNIFICANT CHANGE UP (ref 0–0.2)
BASOPHILS NFR BLD AUTO: 0.2 % — SIGNIFICANT CHANGE UP (ref 0–2)
BILIRUB SERPL-MCNC: 0.4 MG/DL — SIGNIFICANT CHANGE UP (ref 0.2–1.2)
BUN SERPL-MCNC: 10 MG/DL — SIGNIFICANT CHANGE UP (ref 7–23)
C PNEUM DNA SPEC QL NAA+PROBE: SIGNIFICANT CHANGE UP
CALCIUM SERPL-MCNC: 9.5 MG/DL — SIGNIFICANT CHANGE UP (ref 8.4–10.5)
CHLORIDE SERPL-SCNC: 104 MMOL/L — SIGNIFICANT CHANGE UP (ref 98–107)
CO2 SERPL-SCNC: 22 MMOL/L — SIGNIFICANT CHANGE UP (ref 22–31)
CREAT SERPL-MCNC: 1.09 MG/DL — SIGNIFICANT CHANGE UP (ref 0.5–1.3)
EOSINOPHIL # BLD AUTO: 0.02 K/UL — SIGNIFICANT CHANGE UP (ref 0–0.5)
EOSINOPHIL NFR BLD AUTO: 0.2 % — SIGNIFICANT CHANGE UP (ref 0–6)
ETHANOL SERPL-MCNC: <10 MG/DL — SIGNIFICANT CHANGE UP
FLUAV SUBTYP SPEC NAA+PROBE: SIGNIFICANT CHANGE UP
FLUBV RNA SPEC QL NAA+PROBE: SIGNIFICANT CHANGE UP
GLUCOSE SERPL-MCNC: 112 MG/DL — HIGH (ref 70–99)
HADV DNA SPEC QL NAA+PROBE: SIGNIFICANT CHANGE UP
HCOV 229E RNA SPEC QL NAA+PROBE: SIGNIFICANT CHANGE UP
HCOV HKU1 RNA SPEC QL NAA+PROBE: SIGNIFICANT CHANGE UP
HCOV NL63 RNA SPEC QL NAA+PROBE: SIGNIFICANT CHANGE UP
HCOV OC43 RNA SPEC QL NAA+PROBE: SIGNIFICANT CHANGE UP
HCT VFR BLD CALC: 48.1 % — SIGNIFICANT CHANGE UP (ref 39–50)
HGB BLD-MCNC: 16.2 G/DL — SIGNIFICANT CHANGE UP (ref 13–17)
HMPV RNA SPEC QL NAA+PROBE: SIGNIFICANT CHANGE UP
HPIV1 RNA SPEC QL NAA+PROBE: SIGNIFICANT CHANGE UP
HPIV2 RNA SPEC QL NAA+PROBE: SIGNIFICANT CHANGE UP
HPIV3 RNA SPEC QL NAA+PROBE: SIGNIFICANT CHANGE UP
HPIV4 RNA SPEC QL NAA+PROBE: SIGNIFICANT CHANGE UP
IANC: 7.14 K/UL — SIGNIFICANT CHANGE UP (ref 1.5–8.5)
IMM GRANULOCYTES NFR BLD AUTO: 0.4 % — SIGNIFICANT CHANGE UP (ref 0–1.5)
LYMPHOCYTES # BLD AUTO: 1.12 K/UL — SIGNIFICANT CHANGE UP (ref 1–3.3)
LYMPHOCYTES # BLD AUTO: 12.5 % — LOW (ref 13–44)
MCHC RBC-ENTMCNC: 27.9 PG — SIGNIFICANT CHANGE UP (ref 27–34)
MCHC RBC-ENTMCNC: 33.7 GM/DL — SIGNIFICANT CHANGE UP (ref 32–36)
MCV RBC AUTO: 82.9 FL — SIGNIFICANT CHANGE UP (ref 80–100)
MONOCYTES # BLD AUTO: 0.61 K/UL — SIGNIFICANT CHANGE UP (ref 0–0.9)
MONOCYTES NFR BLD AUTO: 6.8 % — SIGNIFICANT CHANGE UP (ref 2–14)
NEUTROPHILS # BLD AUTO: 7.14 K/UL — SIGNIFICANT CHANGE UP (ref 1.8–7.4)
NEUTROPHILS NFR BLD AUTO: 79.9 % — HIGH (ref 43–77)
NRBC # BLD: 0 /100 WBCS — SIGNIFICANT CHANGE UP
NRBC # FLD: 0 K/UL — SIGNIFICANT CHANGE UP
PLATELET # BLD AUTO: 188 K/UL — SIGNIFICANT CHANGE UP (ref 150–400)
POTASSIUM SERPL-MCNC: 3.6 MMOL/L — SIGNIFICANT CHANGE UP (ref 3.5–5.3)
POTASSIUM SERPL-SCNC: 3.6 MMOL/L — SIGNIFICANT CHANGE UP (ref 3.5–5.3)
PROT SERPL-MCNC: 7.2 G/DL — SIGNIFICANT CHANGE UP (ref 6–8.3)
RAPID RVP RESULT: SIGNIFICANT CHANGE UP
RBC # BLD: 5.8 M/UL — SIGNIFICANT CHANGE UP (ref 4.2–5.8)
RBC # FLD: 11.9 % — SIGNIFICANT CHANGE UP (ref 10.3–14.5)
RSV RNA SPEC QL NAA+PROBE: SIGNIFICANT CHANGE UP
RV+EV RNA SPEC QL NAA+PROBE: SIGNIFICANT CHANGE UP
SALICYLATES SERPL-MCNC: <5 MG/DL — LOW (ref 15–30)
SARS-COV-2 RNA SPEC QL NAA+PROBE: SIGNIFICANT CHANGE UP
SODIUM SERPL-SCNC: 141 MMOL/L — SIGNIFICANT CHANGE UP (ref 135–145)
TOXICOLOGY SCREEN, DRUGS OF ABUSE, SERUM RESULT: SIGNIFICANT CHANGE UP
TSH SERPL-MCNC: 4.03 UIU/ML — SIGNIFICANT CHANGE UP (ref 0.27–4.2)
WBC # BLD: 8.95 K/UL — SIGNIFICANT CHANGE UP (ref 3.8–10.5)
WBC # FLD AUTO: 8.95 K/UL — SIGNIFICANT CHANGE UP (ref 3.8–10.5)

## 2021-07-02 PROCEDURE — 99285 EMERGENCY DEPT VISIT HI MDM: CPT

## 2021-07-02 RX ORDER — OLANZAPINE 15 MG/1
5 TABLET, FILM COATED ORAL ONCE
Refills: 0 | Status: DISCONTINUED | OUTPATIENT
Start: 2021-07-02 | End: 2021-07-09

## 2021-07-02 RX ORDER — OLANZAPINE 15 MG/1
5 TABLET, FILM COATED ORAL EVERY 6 HOURS
Refills: 0 | Status: DISCONTINUED | OUTPATIENT
Start: 2021-07-02 | End: 2021-07-09

## 2021-07-02 RX ORDER — BENZTROPINE MESYLATE 1 MG
1 TABLET ORAL
Qty: 0 | Refills: 0 | DISCHARGE

## 2021-07-02 RX ORDER — ACTIVATED CHARCOAL 25 G/120ML
100 SUSPENSION, ORAL (FINAL DOSE FORM) ORAL ONCE
Refills: 0 | Status: DISCONTINUED | OUTPATIENT
Start: 2021-07-02 | End: 2021-07-02

## 2021-07-02 RX ORDER — HALOPERIDOL DECANOATE 100 MG/ML
5 INJECTION INTRAMUSCULAR ONCE
Refills: 0 | Status: COMPLETED | OUTPATIENT
Start: 2021-07-02 | End: 2021-07-02

## 2021-07-02 RX ORDER — ARIPIPRAZOLE 15 MG/1
5 TABLET ORAL DAILY
Refills: 0 | Status: DISCONTINUED | OUTPATIENT
Start: 2021-07-02 | End: 2021-07-06

## 2021-07-02 RX ADMIN — HALOPERIDOL DECANOATE 5 MILLIGRAM(S): 100 INJECTION INTRAMUSCULAR at 10:45

## 2021-07-02 RX ADMIN — Medication 2 MILLIGRAM(S): at 10:45

## 2021-07-02 NOTE — ED BEHAVIORAL HEALTH ASSESSMENT NOTE - HPI (INCLUDE ILLNESS QUALITY, SEVERITY, DURATION, TIMING, CONTEXT, MODIFYING FACTORS, ASSOCIATED SIGNS AND SYMPTOMS)
The patient is a 20 year old  man, single, no dependents, domiciled at home with family, currently unemployed with past work with father, medical history of liver hemangiomas; psychiatric history significant for diagnosis of Schizophrenia dating back from April of 2019, 3 prior psychiatric hospitalizations, 2 at [facility] and 1 at [facility], currently in treatment at [facility] with Dr. Alonzo and Dr Manzano (therapist), no hx suicide attempts, no self-injurious behavior, currently prescribed Abilify 15 mg daily and Cogentin 0.5mg; substance history significant for current cannabis (THC) use - has been using on a daily basis for at least the past  couple of months; no history of violence/legal problems/arrest was BIB EMS activated by pt's mother for disorganization/aggression and intentional overdose on ~10 15mg pills of Abilify and an  unknown amount of cogentin     Patient interviewed after period of observation in the ED 2/2 overdose. On interview, patient is calm, somewhat drowsy but able to carry on a conversation. States the reason he was brought in is because "I need to get away from my parents". States he got into an argument with his parents because they are trying to hold him back and prevent him from "going places in life", (which is inconsistent with collateral from mother, who states pt was found screaming to himself in his room). Pt states he decided to take the pills when EMS arrived because "I didn't want to do this again". Patient confirms intent to harm himself when he took the pills. When asked if he wanted to die, he states "I wanted to leave." Admits to contemplating ending his life numerous times over the past few weeks, but refuses to disclose other methods of how he would go about doing so, does state, however "I've thought of many ways." Denies access to gun in the home. When asked about paranoia, pt states he did feel paranoid in the past (2019) but denies current paranoia, though when asked about his family's medical history, pt states "They have some dark stuff going on." When asked if he thinks they want to hurt him, he appears confused and states "I don't know, do they?" Patient endorses AH which have returned in the past few weeks. In the past, he has had CAH to "do bad things", but states the voices, though recently returned, have been positive in nature and "helpful." Denies VH. Patient admits to ongoing passive SI, states he is ambivalent about dying.

## 2021-07-02 NOTE — ED BEHAVIORAL HEALTH ASSESSMENT NOTE - SUMMARY
The patient is a 20 year old  man, single, no dependents, domiciled at home with family, currently unemployed with past work with father, medical history of liver hemangiomas; psychiatric history significant for diagnosis of Schizophrenia dating back from April of 2019, 3 prior psychiatric hospitalizations, 2 at [facility] and 1 at [facility], currently in treatment at [facility] with Dr. Alonzo and Dr Manzano (therapist), no hx suicide attempts, no self-injurious behavior, currently prescribed Abilify 15 mg daily and Cogentin 0.5mg; substance history significant for current cannabis (THC) use - has been using on a daily basis for at least the past  couple of months; no history of violence/legal problems/arrest was BIB EMS activated by pt's mother for disorganization/aggression and intentional overdose on ~10 15mg pills of Abilify and an  unknown amount of cogentin.    Patient is cooperative with interview but presents with disorganized and vague thoughts, also currently expressing passive SI after intentional overdose. Admits to  , and appears mildly suspicious and guarded, likely experiencing paranoid delusions. Per collateral, pt has been disorganized, not attending to ADL's including eating and personal hygiene. Symptoms are occurring in the context of treatment non-compliance after . As such patient is appropriate for acute emergency psychiatric hospitalization for stabilization and safety and will be admitted on 9.39 involuntary status.      PLAN:  -Admit pt to [facility]  - 9.39 legal status  -Continue Abilify 15mg The patient is a 20 year old  man, single, no dependents, domiciled at home with family, currently unemployed with past work with father, medical history of liver hemangiomas; psychiatric history significant for diagnosis of Schizophrenia dating back from April of 2019, 3 prior psychiatric hospitalizations, 2 at [facility] and 1 at [facility], currently in treatment at [facility] with Dr. Alonzo and Dr Manzano (therapist), no hx suicide attempts, no self-injurious behavior, currently prescribed Abilify 15 mg daily and Cogentin 0.5mg; substance history significant for current cannabis (THC) use - has been using on a daily basis for at least the past  couple of months; no history of violence/legal problems/arrest was BIB EMS activated by pt's mother for disorganization/aggression and intentional overdose on ~10 15mg pills of Abilify and an unknown amount of cogentin.    Patient is cooperative with interview but presents with disorganized and vague thoughts, also currently expressing passive SI after intentional overdose. Admits to  , and appears mildly suspicious and guarded, likely experiencing paranoid delusions. Per collateral, pt has been disorganized, not attending to ADL's including eating and personal hygiene. Symptoms are occurring in the context of treatment non-compliance after . As such patient is appropriate for acute emergency psychiatric hospitalization for stabilization and safety and will be admitted on 9.39 involuntary status.    PLAN:  -Admit pt to [facility]  - 9.39 legal status  -Abilify 5mg for now, hold cogentin for now   -PRNs: Zyprexa 5mg po/im q6, Ativan po 2mg q6
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
## 2021-07-02 NOTE — ED BEHAVIORAL HEALTH NOTE - BEHAVIORAL HEALTH NOTE
COVID Exposure Screen- Patient  1.	*Have you had a COVID-19 test in the last 90 days?  (  ) Yes   ( x ) No   (  ) Unknown- Reason: _____  IF YES PROCEED TO QUESTION #2. IF NO OR UNKNOWN, PLEASE SKIP TO QUESTION #3.  2.	Date of test(s) and result(s): ________  3.	*Have you tested positive for COVID-19 antibodies? (  ) Yes   ( x ) No   (  ) Unknown- Reason: _____  IF YES PROCEED TO QUESTION #4. IF NO or UNKNOWN, PLEASE SKIP TO QUESTION #5.  4.	Date of positive antibody test: ________  5.	*Have you received 2 doses of the COVID-19 vaccine? (  ) Yes   ( x ) No   (  ) Unknown- Reason: _____   IF YES PROCEED TO QUESTION #6. IF NO or UNKNOWN, PLEASE SKIP TO QUESTION #7.  6.	Date of second dose: ________  7.	*In the past 10 days, have you been around anyone with a positive COVID-19 test?* (  ) Yes   ( x ) No   (  ) Unknown- Reason: ____  IF YES PROCEED TO QUESTION #8. IF NO or UNKNOWN, PLEASE SKIP TO QUESTION #13.  8.	Were you within 6 feet of them for at least 15 minutes? (  ) Yes   (  ) No   (  ) Unknown- Reason: _____  9.	Have you provided care for them? (  ) Yes   (  ) No   (  ) Unknown- Reason: ______  10.	Have you had direct physical contact with them (touched, hugged, or kissed them)? (  ) Yes   (  ) No    (  ) Unknown- Reason: _____  11.	Have you shared eating or drinking utensils with them? (  ) Yes   (  ) No    (  ) Unknown- Reason: ____  12.	Have they sneezed, coughed, or somehow gotten respiratory droplets on you? (  ) Yes   (  ) No    (  ) Unknown- Reason: ______  13.	*Have you been out of [place] within the past 10 days?* (  ) Yes   ( x ) No   (  ) Unknown- Reason: _____  IF YES PLEASE ANSWER THE FOLLOWING QUESTIONS:  14.	Which state/country have you been to? ______  15.	Were you there over 24 hours? (  ) Yes   (  ) No    (  ) Unknown- Reason: ______  16.	Date of return to [facility]: ______s on the patient? (  ) Yes   (  ) No    (  ) Unknown- Reason: ______

## 2021-07-02 NOTE — ED PROVIDER NOTE - PHYSICAL EXAMINATION
G: NAD, cooperative with exam   H: NCAT  E: EOMI, no conjunctival pallor   M: Mucous membranes moist   R: CTABL, nWOB  C: Nl S1/S2, no mrg  A: Soft, NT/ND, no rebound/guarding   MSK:  no LE edema
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
## 2021-07-02 NOTE — ED ADULT TRIAGE NOTE - NS ED TRIAGE AVPU SCALE
Alert-The patient is alert, awake and responds to voice. The patient is oriented to time, place, and person. The triage nurse is able to obtain subjective information. not examined
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
## 2021-07-02 NOTE — ED BEHAVIORAL HEALTH NOTE - BEHAVIORAL HEALTH NOTE
Spoke with pt's mother, Ron for collateral. [phone]    States he has been getting worse in terms of mood, has also been having notably decreased appetite. On 6/26, he came home from [employer] art class and complained of low appetite and feeling "unwell in my head". The next day, he woke up in the morning and was noted to  be speaking faster than usual, biting his lips. Over the past few days, he has had a "lost" look on his face. Has been more isolative, not talking to his younger sister with whom he usually talks frequently. Hasn't been sleeping very much. States sometimes he seems to get lost in what he is doing, for example, early this morning, he came to the kitchen and went to reach for a glass of water, but then looked as if confused and then did not  the glass. Has been muttering incoherently to himself.     Regarding the events of today, mom reports that she was sleeping when she heard screaming coming from the patient's room. When she went to check what was wrong, she opened the door and pt was screaming to himself. Today, he was screaming in the garage, when mom opened the door, she realized he was screaming to himself. She tried to calm him down but continued screaming, so she called 911. Mom states he has a marijuana vape but doesn't know how often he uses it.     Mom states last Abilify Maintena injection was early February. 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  After the February injection, pt refused to take anymore medication. Did recently start taking Abilify 15mg PO and benztropine 0.5mg daily but does not know if he has been adherent.

## 2021-07-02 NOTE — ED BEHAVIORAL HEALTH ASSESSMENT NOTE - CASE SUMMARY
20M with schizophrenia presents for agitation and suicide attempt. Patient aggressive with staff requiring stat IM at triage. He was moved to medical ED for clearance due to suspected Abilify/Cogentin overdose and medically cleared. On exam he admits to suicide intent and ambivalence about being alive. He is hearing voices. He is a danger to himself requiring inpatient stabilization. 939. Responded well to PRN and remained in behavioral control for remainder of ED stay, no suicidal ideation currently, would not recommend CO at this time. EMS transport to unit.
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
## 2021-07-02 NOTE — ED BEHAVIORAL HEALTH ASSESSMENT NOTE - OTHER PAST PSYCHIATRIC HISTORY (INCLUDE DETAILS REGARDING ONSET, COURSE OF ILLNESS, INPATIENT/OUTPATIENT TREATMENT)
diagnosis of schizophrenia  2 prior inpatient admission on [facility], 1 at [facility]

## 2021-07-02 NOTE — ED BEHAVIORAL HEALTH ASSESSMENT NOTE - PSYCHIATRIC ISSUES AND PLAN (INCLUDE STANDING AND PRN MEDICATION)
schizophrenia- abilify 15 mg po qd, Cogentin 0.5 mg po BID will continue abilify at lower dose (5mg) and hold cogentin for now. Zyprexa 5mg po/im for agitation and Ativan 2mg po for anxiwety will continue abilify at lower dose (5mg) and hold cogentin for now. Zyprexa 5mg po/im for agitation and Ativan 2mg po for anxiety

## 2021-07-02 NOTE — ED PROVIDER NOTE - NS ED ROS FT
Gen: No F/C/NS  Eyes: No changes in vision    Resp: No cough or trouble breathing  Cardiovascular: No chest pain or palpitation  Gastroenteric: No N/V/D  :  No change in urine output, dysuria or hematuria   MS: No joint or muscle pain  Skin: No rashes, lacerations, wounds or abrasions   Neuro: No headache; no abnormal movements
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
## 2021-07-02 NOTE — CONSULT NOTE ADULT - ASSESSMENT
·	Please give SDAC (1 gm/Kg) as patient is within 2 hours of RONN.   ·	Please include a serum CK.   ·	Monitor the patient for 6 hours from RONN during which if remains hemodynamically stable, labs are within normal limits and patient has no new active complaints then can be cleared from Toxicology standpoint.   ·	Will suggest to involve psyche as intent was suicidal.     Thank you for the consult.  ·	Please give SDAC (1 gm/Kg) as patient is within 2 hours of RONN.   ·	Would advise against Haloperidol d/t antimuscarinic side effect profile, if patient is agitated consider using BZD x PRN.   ·	Please include a serum CK.   ·	Monitor the patient for 6 hours from RONN during which if remains hemodynamically stable, labs are within normal limits and patient has no new active complaints then can be cleared from Toxicology standpoint.   ·	Will suggest to involve psyche as intent was suicidal.     Thank you for the consult.

## 2021-07-02 NOTE — ED BEHAVIORAL HEALTH NOTE - BEHAVIORAL HEALTH NOTE
Writer contacted [employer] # [id number]. Writer spoke with Missy BURTON who provided pre auth #[id number] good until next business day. [place] representative to reach out to [facility] contact on Monday to complete process.
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
## 2021-07-02 NOTE — ED ADULT NURSE REASSESSMENT NOTE - NS ED NURSE REASSESS COMMENT FT1
report given to [facility] Thang RN Domo, pt calm/cooperative, pending EMS transport to [facility]

## 2021-07-03 LAB
COVID-19 SPIKE DOMAIN AB INTERP: POSITIVE
COVID-19 SPIKE DOMAIN ANTIBODY RESULT: 230 U/ML — HIGH
SARS-COV-2 IGG+IGM SERPL QL IA: 230 U/ML — HIGH
SARS-COV-2 IGG+IGM SERPL QL IA: POSITIVE

## 2021-07-03 PROCEDURE — 99222 1ST HOSP IP/OBS MODERATE 55: CPT

## 2021-07-03 RX ORDER — OLANZAPINE 15 MG/1
5 TABLET, FILM COATED ORAL ONCE
Refills: 0 | Status: DISCONTINUED | OUTPATIENT
Start: 2021-07-03 | End: 2021-07-07

## 2021-07-03 RX ADMIN — OLANZAPINE 5 MILLIGRAM(S): 15 TABLET, FILM COATED ORAL at 12:49

## 2021-07-03 RX ADMIN — ARIPIPRAZOLE 5 MILLIGRAM(S): 15 TABLET ORAL at 11:05

## 2021-07-03 RX ADMIN — Medication 2 MILLIGRAM(S): at 12:49
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
## 2021-07-03 NOTE — BH INPATIENT PSYCHIATRY ASSESSMENT NOTE - HPI (INCLUDE ILLNESS QUALITY, SEVERITY, DURATION, TIMING, CONTEXT, MODIFYING FACTORS, ASSOCIATED SIGNS AND SYMPTOMS)
The patient is a 20 year old  man, single, no dependents, domiciled at home with family, currently unemployed with past work with father, medical history of liver hemangiomas; psychiatric history significant for diagnosis of Schizophrenia dating back from April of 2019, 3 prior psychiatric hospitalizations, 2 at [facility] and 1 at [facility], currently in treatment at [facility] with Dr. Alonzo and Dr Manzano (therapist), no hx suicide attempts, no self-injurious behavior, currently prescribed Abilify 15 mg daily and Cogentin 0.5mg; substance history significant for current cannabis (THC) use - has been using on a daily basis for at least the past  couple of months; no history of violence/legal problems/arrest was BIB EMS activated by pt's mother for disorganization/aggression and intentional overdose on ~10 15mg pills of Abilify and an  unknown amount of cogentin   Patient interviewed after period of observation in the ED 2/2 overdose. On interview, patient is calm, somewhat drowsy but able to carry on a conversation. States the reason he was brought in is because "I need to get away from my parents". States he got into an argument with his parents because they are trying to hold him back and prevent him from "going places in life", (which is inconsistent with collateral from mother, who states pt was found screaming to himself in his room). Pt states he decided to take the pills when EMS arrived because "I didn't want to do this again". Patient confirms intent to harm himself when he took the pills. When asked if he wanted to die, he states "I wanted to leave." Admits to contemplating ending his life numerous times over the past few weeks, but refuses to disclose other methods of how he would go about doing so, does state, however "I've thought of many ways." Denies access to gun in the home. When asked about paranoia, pt states he did feel paranoid in the past (2019) but denies current paranoia, though when asked about his family's medical history, pt states "They have some dark stuff going on." When asked if he thinks they want to hurt him, he appears confused and states "I don't know, do they?" Patient endorses AH which have returned in the past few weeks. In the past, he has had CAH to "do bad things", but states the voices, though recently returned, have been positive in nature and "helpful." Denies VH. Patient admits to ongoing passive SI, states he is ambivalent about dying.    On unit: Patient was seen and evaluated, chart reviewed. Case discussed with nursing team.  On service for this 20 year old male with PPH of Schizophrenia Disorder. Patient is hospitalized with a primary problem of  psychotic decompensation, disorganization and aggression at home. Patient admitted to [facility] on a 9.39 legal status. I have reviewed the initial psychiatric assessment in the electronic medical record, including the history of present illness, past psychiatric history, family/social history (no pertinent changes), and exam, and have confirmed the salient findings dated 7/2/21.  As per chart review, transferring records indicated the following:  The patient is a 20 year old [place] man, single, no dependents, domiciled at home with family, currently unemployed with past work with father, medical history of liver hemangiomas; psychiatric history significant for diagnosis of Schizophrenia dating back from April of 2019, 3 prior psychiatric hospitalizations, 2 at [facility] and 1 at [facility], currently in treatment at [facility] with Dr. Alonzo and Dr Manzano (therapist), no hx suicide attempts, no self-injurious behavior, currently prescribed Abilify 15 mg daily and Cogentin 0.5mg; substance history significant for current cannabis (THC) use - has been using on a daily basis for at least the past  couple of months; no history of violence/legal problems/arrest was BIB EMS activated by pt's mother for disorganization/aggression and intentional overdose on ~10 15mg pills of Abilify and an  unknown amount of cogentin   Patient interviewed after period of observation in the ED 2/2 overdose. On interview, patient is calm, somewhat drowsy but able to carry on a conversation. States the reason he was brought in is because "I need to get away from my parents". States he got into an argument with his parents because they are trying to hold him back and prevent him from "going places in life", (which is inconsistent with collateral from mother, who states pt was found screaming to himself in his room). Pt states he decided to take the pills when EMS arrived because "I didn't want to do this again". Patient confirms intent to harm himself when he took the pills. When asked if he wanted to die, he states "I wanted to leave." Admits to contemplating ending his life numerous times over the past few weeks, but refuses to disclose other methods of how he would go about doing so, does state, however "I've thought of many ways." Denies access to gun in the home. When asked about paranoia, pt states he did feel paranoid in the past (2019) but denies current paranoia, though when asked about his family's medical history, pt states "They have some dark stuff going on." When asked if he thinks they want to hurt him, he appears confused and states "I don't know, do they?" Patient endorses AH which have returned in the past few weeks. In the past, he has had CAH to "do bad things", but states the voices, though recently returned, have been positive in nature and "helpful." Denies VH. Patient admits to ongoing passive SI, states he is ambivalent about dying.    On unit:  Information Received From: Chart review and patient interview  Patient is followed up for Schizophrenia, admitted for psychotic decompensation, disorganization and aggression at home.  Chart, medications and labs reviewed.  Patient is discussed with nursing staff. No significant overnight issues.  Patient is observed in quite room, irritable upon approach, hesitant to engage in interview.  When questioned about precipitating events leading to current admission and why he is here pt replies “it’s all documented I don’t want to answer questions I don’t want to be evaluated anymore.”  Interview was limited/ challenging due to patient’s disorganization, paranoia and unwillingness to participate.    Per nursing no behavioral concerns, no prn for aggression. Compliant with standing medications, and he is eating, sleeping well. Per nursing patient has been minimizing symptoms, and was annoyed about having to take medications. Admission labs reviewed, no acute findings. A1C/lipid/u-tox ordered.

## 2021-07-03 NOTE — PSYCHIATRIC REHAB INITIAL EVALUATION - NSBHPRRECOMMEND_PSY_ALL_CORE
Writer met with patient to introduce patient to psych rehab staff and services.  During initial psych rehab assessment patient presented with an irritable mood and slightly constricted affect. Patient reported he was brought in due to an "altercation" with family.  Patient would not elaborate to writer.  Patient denied SI/HI as well as AH/VH.  As per patient's treatment team, patient has history of noncompliance and patient reported he will not take medications.  Patient denied outpatient treatment. Patient was brought in by EMS due to disorganization and aggression at home. Patient reported he uses cannabis daily, patient denied other substances. Patient is assessed with poor insight and judgment.  Patient is assessed with fair ADLs and was observed in hospital gowns during initial psych rehab assessment.      Psych rehab staff will continue to engage patient daily to build therapeutic rapport and assist patient in psych rehab goals pertaining to maintaining medication and treatment compliance as well as attending daily psych rehab groups for improved symptom management within seven days.

## 2021-07-03 NOTE — BH INPATIENT PSYCHIATRY ASSESSMENT NOTE - NSBHMETABOLIC_PSY_ALL_CORE_FT
BMI: BMI (kg/m2): 25.4 (07-02-21 @ 22:45)  HbA1c:   Glucose:   BP: 128/69 (07-02-21 @ 18:24) (128/69 - 160/105)  Lipid Panel:

## 2021-07-03 NOTE — BH INPATIENT PSYCHIATRY ASSESSMENT NOTE - OTHER PAST PSYCHIATRIC HISTORY (INCLUDE DETAILS REGARDING ONSET, COURSE OF ILLNESS, INPATIENT/OUTPATIENT TREATMENT)
diagnosis of schizophrenia  2 prior inpatient admission on [facility], 1 at [facility]

## 2021-07-03 NOTE — BH INPATIENT PSYCHIATRY ASSESSMENT NOTE - CURRENT MEDICATION
MEDICATIONS  (STANDING):  ARIPiprazole 5 milliGRAM(s) Oral daily    MEDICATIONS  (PRN):  LORazepam     Tablet 2 milliGRAM(s) Oral every 6 hours PRN anxiety  OLANZapine 5 milliGRAM(s) Oral every 6 hours PRN combativeness due to psychosis  OLANZapine Injectable 5 milliGRAM(s) IntraMuscular once PRN combativeness due to psychosis

## 2021-07-03 NOTE — PSYCHIATRIC REHAB INITIAL EVALUATION - NSBHEMPLOYERFT_PSY_ALL_CORE
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  Patient would not elaborate to writer.

## 2021-07-03 NOTE — BH INPATIENT PSYCHIATRY ASSESSMENT NOTE - DESCRIPTION
dropped out of [employer] college, lives at home with family, unemployed, takes kickboxing classes currently

## 2021-07-03 NOTE — BH INPATIENT PSYCHIATRY ASSESSMENT NOTE - RISK ASSESSMENT
Risk factors include single, male, unemployed, acute psychosis, med noncompliant, anxiety, hopelessness substance abuse, current suicidal ideation in the context of intentional overdose prior to presentation. Protective factors include stability of domicile, supportive family. Given the above, patient is felt to be at imminent risk of suicide and unsafe for discharge at this time

## 2021-07-03 NOTE — BH INPATIENT PSYCHIATRY ASSESSMENT NOTE - NSBHCHARTREVIEWVS_PSY_A_CORE FT
Vital Signs Last 24 Hrs  T(C): 36.7 (02 Jul 2021 22:45), Max: 36.7 (02 Jul 2021 15:15)  T(F): 98 (02 Jul 2021 22:45), Max: 98.1 (02 Jul 2021 15:15)  HR: 61 (02 Jul 2021 18:24) (61 - 73)  BP: 128/69 (02 Jul 2021 18:24) (128/69 - 141/90)  BP(mean): --  RR: 16 (02 Jul 2021 22:45) (16 - 16)  SpO2: 100% (02 Jul 2021 22:45) (98% - 100%)

## 2021-07-03 NOTE — BH INPATIENT PSYCHIATRY ASSESSMENT NOTE - NSBHASSESSSUMMFT_PSY_ALL_CORE
Plan:  >Legal: 9.39  >Obs: Routine, no current SI. no need for CO, patient not expected to pose risk to self or others in controlled inpatient setting  >Psychiatric Meds: Restart outpatient medication regimen. Observe for tolerability and efficacy. Patient had been poorly adherent prior to admission. Upon last admission patient was effectively managed on this regimen.    PRN medications:  Ativan 2mg oral Q6HR PRN for agitation and anxiety.  Haldol 5mg oral Q6HR PRN for agitation.   Benadryl 50mg oral Q6HR PRN for agitation.   Vistaril 50mg oral Q6HR PRN for anxiety.  Desyrel 50mg oral QHS PRN for insomnia.   >Labs: Admission labs reviewed, no acute findings. labs pending for tomorrow,  A1c, Lipid, u-tox. Hold antipsychotics if QTc >500  >Medical:  hx of liver hemangiomas. No acute concerns. No consultations needed at this time. No indication for CIWA. Patient with consistently stable VS, During the course of treatment, will collaborate with medical team to manage medical issues.  >Diet: Regular  >Social: milieu/structured therapy  >Treatment Interventions: Groups and Individual Therapy/CBT, Motivational counseling for substance abuse related issues.   >Dispo: Collateral and dispo planning pending further symptom and medication optimization

## 2021-07-04 LAB
A1C WITH ESTIMATED AVERAGE GLUCOSE RESULT: 5.2 % — SIGNIFICANT CHANGE UP (ref 4–5.6)
CHOLEST SERPL-MCNC: 169 MG/DL — SIGNIFICANT CHANGE UP
ESTIMATED AVERAGE GLUCOSE: 103 — SIGNIFICANT CHANGE UP
HDLC SERPL-MCNC: 35 MG/DL — LOW
LIPID PNL WITH DIRECT LDL SERPL: 116 MG/DL — HIGH
NON HDL CHOLESTEROL: 134 MG/DL — HIGH
TRIGL SERPL-MCNC: 90 MG/DL — SIGNIFICANT CHANGE UP

## 2021-07-04 PROCEDURE — 99232 SBSQ HOSP IP/OBS MODERATE 35: CPT

## 2021-07-04 RX ADMIN — ARIPIPRAZOLE 5 MILLIGRAM(S): 15 TABLET ORAL at 08:37

## 2021-07-04 RX ADMIN — Medication 2 MILLIGRAM(S): at 08:37

## 2021-07-05 PROCEDURE — 99232 SBSQ HOSP IP/OBS MODERATE 35: CPT

## 2021-07-05 RX ORDER — IBUPROFEN 200 MG
400 TABLET ORAL EVERY 6 HOURS
Refills: 0 | Status: DISCONTINUED | OUTPATIENT
Start: 2021-07-05 | End: 2021-07-29

## 2021-07-05 RX ORDER — POLYETHYLENE GLYCOL 3350 17 G/17G
17 POWDER, FOR SOLUTION ORAL ONCE
Refills: 0 | Status: DISCONTINUED | OUTPATIENT
Start: 2021-07-05 | End: 2021-07-29

## 2021-07-05 RX ADMIN — Medication 400 MILLIGRAM(S): at 15:45

## 2021-07-05 RX ADMIN — Medication 400 MILLIGRAM(S): at 16:22

## 2021-07-05 RX ADMIN — ARIPIPRAZOLE 5 MILLIGRAM(S): 15 TABLET ORAL at 08:18

## 2021-07-05 NOTE — BH INPATIENT PSYCHIATRY PROGRESS NOTE - NSBHASSESSSUMMFT_PSY_ALL_CORE
Plan:  >Legal: 9.39  >Obs: Routine, no current SI. no need for CO, patient not expected to pose risk to self or others in controlled inpatient setting  >Psychiatric Meds: Restart outpatient medication regimen. Observe for tolerability and efficacy. Patient had been poorly adherent prior to admission. Upon last admission patient was effectively managed on this regimen.    PRN medications:  Ativan 2mg oral Q6HR PRN for agitation and anxiety.  Haldol 5mg oral Q6HR PRN for agitation.   Benadryl 50mg oral Q6HR PRN for agitation.   Vistaril 50mg oral Q6HR PRN for anxiety.  Desyrel 50mg oral QHS PRN for insomnia.   >Labs: Admission labs reviewed, no acute findings. labs pending for tomorrow,  A1c, Lipid, u-tox. Hold antipsychotics if QTc >500  >Medical:  hx of liver hemangiomas. No acute concerns. No consultations needed at this time. No indication for CIWA. Patient with consistently stable VS, During the course of treatment, will collaborate with medical team to manage medical issues.  >Diet: Regular  >Social: milieu/structured therapy  >Treatment Interventions: Groups and Individual Therapy/CBT, Motivational counseling for substance abuse related issues.   >Dispo: Collateral and dispo planning pending further symptom and medication optimization     Plan:  >Legal: 9.39  >Obs: Routine, no current SI. no need for CO, patient not expected to pose risk to self or others in controlled inpatient setting  >Psychiatric Meds: Restart outpatient medication regimen. Observe for tolerability and efficacy. Patient had been poorly adherent prior to admission. Upon last admission patient was effectively managed on this regimen.    PRN medications:  Ativan 2mg oral Q6HR PRN for agitation and anxiety.  Haldol 5mg oral Q6HR PRN for agitation.   Benadryl 50mg oral Q6HR PRN for agitation.   Vistaril 50mg oral Q6HR PRN for anxiety.  Desyrel 50mg oral QHS PRN for insomnia.   >Labs: Admission labs reviewed, no acute findings. labs pending for tomorrow,  A1c, Lipid, u-tox. Hold antipsychotics if QTc >500  >Medical:  hx of liver hemangiomas. No acute concerns. No consultations needed at this time. No indication for CIWA. Patient with consistently stable VS, During the course of treatment, will collaborate with medical team to manage medical issues.  >Diet: Halal Diet, Regular  >Social: milieu/structured therapy  >Treatment Interventions: Groups and Individual Therapy/CBT, Motivational counseling for substance abuse related issues.   >Dispo: Collateral and dispo planning pending further symptom and medication optimization

## 2021-07-06 PROCEDURE — 99232 SBSQ HOSP IP/OBS MODERATE 35: CPT

## 2021-07-06 RX ORDER — ARIPIPRAZOLE 15 MG/1
10 TABLET ORAL DAILY
Refills: 0 | Status: DISCONTINUED | OUTPATIENT
Start: 2021-07-07 | End: 2021-07-08

## 2021-07-06 RX ADMIN — ARIPIPRAZOLE 5 MILLIGRAM(S): 15 TABLET ORAL at 08:22

## 2021-07-06 RX ADMIN — Medication 2 MILLIGRAM(S): at 03:46

## 2021-07-06 RX ADMIN — Medication 2 MILLIGRAM(S): at 23:31

## 2021-07-06 NOTE — BH SOCIAL WORK INITIAL PSYCHOSOCIAL EVALUATION - NSBHHOUSECOMMENTFT_PSY_ALL_CORE
Patient is domiciled with his family however would like to be able to be independent from family and live on his own.

## 2021-07-06 NOTE — BH INPATIENT PSYCHIATRY PROGRESS NOTE - NSBHASSESSSUMMFT_PSY_ALL_CORE
Plan:  >Legal: 9.39  >Obs: Routine, no current SI. no need for CO, patient not expected to pose risk to self or others in controlled inpatient setting  >Psychiatric Meds: Titrate Abilify to 10mg PO daily  PRN medications:  Ativan 2mg oral Q6HR PRN for agitation and anxiety.  Haldol 5mg oral Q6HR PRN for agitation.   Benadryl 50mg oral Q6HR PRN for agitation.   Vistaril 50mg oral Q6HR PRN for anxiety.  Desyrel 50mg oral QHS PRN for insomnia.   >Labs: Admission labs reviewed, no acute findings  >Medical:  hx of liver hemangiomas. No acute concerns. No consultations needed at this time. No indication for CIWA. Patient with consistently stable VS, During the course of treatment, will collaborate with medical team to manage medical issues.  >Diet: Halal Diet, Regular  >Social: milieu/structured therapy  >Treatment Interventions: Groups and Individual Therapy/CBT, Motivational counseling for substance abuse related issues.   >Dispo: Collateral and dispo planning pending further symptom and medication optimization

## 2021-07-06 NOTE — BH SOCIAL WORK INITIAL PSYCHOSOCIAL EVALUATION - OTHER PAST PSYCHIATRIC HISTORY (INCLUDE DETAILS REGARDING ONSET, COURSE OF ILLNESS, INPATIENT/OUTPATIENT TREATMENT)
PT has diagnosis of schizophrenia prior inpatient admission at [facility] and at [facility].

## 2021-07-07 PROCEDURE — 99232 SBSQ HOSP IP/OBS MODERATE 35: CPT

## 2021-07-07 RX ADMIN — ARIPIPRAZOLE 10 MILLIGRAM(S): 15 TABLET ORAL at 08:28

## 2021-07-07 RX ADMIN — Medication 400 MILLIGRAM(S): at 13:47

## 2021-07-07 NOTE — DIETITIAN INITIAL EVALUATION ADULT. - OTHER INFO
Met with Pt in outdoor area. Pt reports good appetite/po intake at this time. No GI distress noted. Pt states "I was not hungry, did not feel like eating " at home. Follows Halal diet. Food preferences taken and implemented. Discussed with Pt Healthy eating and weight management. Pt verbalized understanding

## 2021-07-08 PROCEDURE — 99232 SBSQ HOSP IP/OBS MODERATE 35: CPT

## 2021-07-08 RX ORDER — ARIPIPRAZOLE 15 MG/1
15 TABLET ORAL DAILY
Refills: 0 | Status: DISCONTINUED | OUTPATIENT
Start: 2021-07-09 | End: 2021-07-12

## 2021-07-08 RX ORDER — MAGNESIUM HYDROXIDE 400 MG/1
30 TABLET, CHEWABLE ORAL ONCE
Refills: 0 | Status: COMPLETED | OUTPATIENT
Start: 2021-07-08 | End: 2021-07-08

## 2021-07-08 RX ADMIN — Medication 400 MILLIGRAM(S): at 18:41

## 2021-07-08 RX ADMIN — Medication 400 MILLIGRAM(S): at 18:30

## 2021-07-08 RX ADMIN — ARIPIPRAZOLE 10 MILLIGRAM(S): 15 TABLET ORAL at 08:28

## 2021-07-08 RX ADMIN — MAGNESIUM HYDROXIDE 30 MILLILITER(S): 400 TABLET, CHEWABLE ORAL at 05:10

## 2021-07-08 NOTE — BH TREATMENT PLAN - NSTXMEDICINTERPR_PSY_ALL_CORE
Psych rehab staff will continue to engage patient daily to build theraputic rapport and assist patient in psych rehab goals pertaining to demonstrating medication and treatment compliance for improved symptom managment within seven days.
Psych rehab staff will continue to engage patient daily to build theraputic rapport and assist patient in psych rehab goals pertaining to demonstrating medication and treatment compliance for improved symptom managment within seven days.

## 2021-07-08 NOTE — BH INPATIENT PSYCHIATRY PROGRESS NOTE - NSBHASSESSSUMMFT_PSY_ALL_CORE
Plan:  >Legal: 9.39  >Obs: Routine, no current SI. no need for CO, patient not expected to pose risk to self or others in controlled inpatient setting  >Psychiatric Meds: Titrate Abilify to 15mg PO daily  >Labs: Admission labs reviewed, no acute findings  >Medical:  hx of liver hemangiomas. No acute concerns. No consultations needed at this time. No indication for CIWA. Patient with consistently stable VS, During the course of treatment, will collaborate with medical team to manage medical issues.  >Diet: Halal Diet, Regular  >Social: milieu/structured therapy  >Treatment Interventions: Groups and Individual Therapy/CBT, Motivational counseling for substance abuse related issues.   >Dispo: Collateral and dispo planning pending further symptom and medication optimization

## 2021-07-08 NOTE — BH TREATMENT PLAN - NSTXPLANTHERAPYSESSIONSFT_PSY_ALL_CORE
07-03-21  Type of therapy: Initial psych rehab assessment.   Type of session: Individual  Level of patient participation: Resistance to participation  Duration of participation: 15 minutes  Therapy conducted by: Psych rehab  Therapy Summary: Writer met with patient to introduce patient to psych rehab staff and services.  During initial psych rehab assessment patient presented with an irritable mood and slightly constricted affect. Patient reported he was brought in due to an "altercation" with family.  Patient would not elaborate to writer.  Patient denied SI/HI as well as AH/VH.  As per patient's treatment team, patient has history of noncompliance and patient reported he will not take medications.  Patient denied outpatient treatment. Patient was brought in by EMS due to disorganization and aggression at home. Patient reported he uses cannabis daily, patient denied other substances. Patient is assessed with poor insight and judgment.  Patient is assessed with fair ADLs and was observed in hospital gowns during initial psych rehab assessment.      Psych rehab staff will continue to engage patient daily to build therapeutic rapport and assist patient in psych rehab goals pertaining to maintaining medication and treatment compliance as well as attending daily psych rehab groups for improved symptom management within seven days.  
  07-03-21  Type of therapy: Initial psych rehab assessment.   Type of session: Individual  Level of patient participation: Resistance to participation  Duration of participation: 15 minutes  Therapy conducted by: Psych rehab  Therapy Summary: Writer met with patient to introduce patient to psych rehab staff and services.  During initial psych rehab assessment patient presented with an irritable mood and slightly constricted affect. Patient reported he was brought in due to an "altercation" with family.  Patient would not elaborate to writer.  Patient denied SI/HI as well as AH/VH.  As per patient's treatment team, patient has history of noncompliance and patient reported he will not take medications.  Patient denied outpatient treatment. Patient was brought in by EMS due to disorganization and aggression at home. Patient reported he uses cannabis daily, patient denied other substances. Patient is assessed with poor insight and judgment.  Patient is assessed with fair ADLs and was observed in hospital gowns during initial psych rehab assessment.      Psych rehab staff will continue to engage patient daily to build therapeutic rapport and assist patient in psych rehab goals pertaining to maintaining medication and treatment compliance as well as attending daily psych rehab groups for improved symptom management within seven days.

## 2021-07-09 PROCEDURE — 99232 SBSQ HOSP IP/OBS MODERATE 35: CPT

## 2021-07-09 RX ORDER — CHLORPROMAZINE HCL 10 MG
100 TABLET ORAL EVERY 6 HOURS
Refills: 0 | Status: DISCONTINUED | OUTPATIENT
Start: 2021-07-09 | End: 2021-07-29

## 2021-07-09 RX ORDER — CHLORPROMAZINE HCL 10 MG
100 TABLET ORAL ONCE
Refills: 0 | Status: DISCONTINUED | OUTPATIENT
Start: 2021-07-09 | End: 2021-07-29

## 2021-07-09 RX ORDER — AMLODIPINE BESYLATE 2.5 MG/1
5 TABLET ORAL DAILY
Refills: 0 | Status: DISCONTINUED | OUTPATIENT
Start: 2021-07-09 | End: 2021-07-13

## 2021-07-09 RX ADMIN — ARIPIPRAZOLE 15 MILLIGRAM(S): 15 TABLET ORAL at 08:32

## 2021-07-09 RX ADMIN — Medication 100 MILLIGRAM(S): at 10:14

## 2021-07-09 RX ADMIN — Medication 400 MILLIGRAM(S): at 06:41

## 2021-07-09 RX ADMIN — Medication 2 MILLIGRAM(S): at 10:15

## 2021-07-09 RX ADMIN — Medication 400 MILLIGRAM(S): at 05:32

## 2021-07-09 NOTE — BH INPATIENT PSYCHIATRY PROGRESS NOTE - NSBHASSESSSUMMFT_PSY_ALL_CORE
Plan:  >Legal: 9.39  >Obs: Routine, no current SI. no need for CO, patient not expected to pose risk to self or others in controlled inpatient setting  >Psychiatric Meds: Titrate Abilify to 15mg PO daily  >Labs: Admission labs reviewed, no acute findings  >Medical: 7/2/21:   spoke to hospitalist Dr. Bradshaw, regarding patient's blood pressure and he recommended starting amlodipine 5mg PO daily  >Diet: Halal Diet, Regular  >Social: milieu/structured therapy  >Treatment Interventions: Groups and Individual Therapy/CBT, Motivational counseling for substance abuse related issues.   >Dispo: Collateral and dispo planning pending further symptom and medication optimization

## 2021-07-10 RX ORDER — POLYETHYLENE GLYCOL 3350 17 G/17G
17 POWDER, FOR SOLUTION ORAL ONCE
Refills: 0 | Status: COMPLETED | OUTPATIENT
Start: 2021-07-10 | End: 2021-07-10

## 2021-07-10 RX ORDER — ACETAMINOPHEN 500 MG
650 TABLET ORAL EVERY 6 HOURS
Refills: 0 | Status: DISCONTINUED | OUTPATIENT
Start: 2021-07-10 | End: 2021-07-29

## 2021-07-10 RX ADMIN — POLYETHYLENE GLYCOL 3350 17 GRAM(S): 17 POWDER, FOR SOLUTION ORAL at 00:16

## 2021-07-10 RX ADMIN — Medication 650 MILLIGRAM(S): at 21:50

## 2021-07-10 RX ADMIN — Medication 650 MILLIGRAM(S): at 22:15

## 2021-07-10 RX ADMIN — Medication 400 MILLIGRAM(S): at 08:59

## 2021-07-10 RX ADMIN — ARIPIPRAZOLE 15 MILLIGRAM(S): 15 TABLET ORAL at 08:19

## 2021-07-10 RX ADMIN — Medication 100 MILLIGRAM(S): at 23:18

## 2021-07-10 RX ADMIN — Medication 400 MILLIGRAM(S): at 08:20

## 2021-07-10 RX ADMIN — Medication 400 MILLIGRAM(S): at 17:56

## 2021-07-10 RX ADMIN — Medication 2 MILLIGRAM(S): at 23:18

## 2021-07-11 RX ADMIN — Medication 650 MILLIGRAM(S): at 15:42

## 2021-07-11 RX ADMIN — Medication 2 MILLIGRAM(S): at 16:24

## 2021-07-11 RX ADMIN — ARIPIPRAZOLE 15 MILLIGRAM(S): 15 TABLET ORAL at 08:24

## 2021-07-12 PROCEDURE — 99232 SBSQ HOSP IP/OBS MODERATE 35: CPT

## 2021-07-12 RX ORDER — ARIPIPRAZOLE 15 MG/1
20 TABLET ORAL DAILY
Refills: 0 | Status: DISCONTINUED | OUTPATIENT
Start: 2021-07-13 | End: 2021-07-16

## 2021-07-12 RX ORDER — CHLORPROMAZINE HCL 10 MG
100 TABLET ORAL ONCE
Refills: 0 | Status: COMPLETED | OUTPATIENT
Start: 2021-07-12 | End: 2021-07-12

## 2021-07-12 RX ADMIN — Medication 100 MILLIGRAM(S): at 10:15

## 2021-07-12 RX ADMIN — Medication 650 MILLIGRAM(S): at 03:08

## 2021-07-12 RX ADMIN — ARIPIPRAZOLE 15 MILLIGRAM(S): 15 TABLET ORAL at 08:28

## 2021-07-12 RX ADMIN — Medication 2 MILLIGRAM(S): at 02:51

## 2021-07-12 RX ADMIN — Medication 3 MILLIGRAM(S): at 10:15

## 2021-07-12 NOTE — BH INPATIENT PSYCHIATRY PROGRESS NOTE - NSBHASSESSSUMMFT_PSY_ALL_CORE
Plan:  >Legal: 9.39  >Obs: Routine, no current SI. no need for CO, patient not expected to pose risk to self or others in controlled inpatient setting  >Psychiatric Meds: Titrate Abilify to 20mg PO daily with mouth checks  >Labs: Admission labs reviewed, no acute findings  >Medical: 7/2/21:   spoke to hospitalist Dr. Bradshaw, regarding patient's blood pressure and he recommended starting amlodipine 5mg PO daily  >Diet: Halal Diet, Regular  >Social: milieu/structured therapy  >Treatment Interventions: Groups and Individual Therapy/CBT, Motivational counseling for substance abuse related issues.   >Dispo: Collateral and dispo planning pending further symptom and medication optimization

## 2021-07-13 PROCEDURE — 99232 SBSQ HOSP IP/OBS MODERATE 35: CPT

## 2021-07-13 RX ADMIN — Medication 650 MILLIGRAM(S): at 11:44

## 2021-07-13 RX ADMIN — Medication 2 MILLIGRAM(S): at 16:26

## 2021-07-13 RX ADMIN — ARIPIPRAZOLE 20 MILLIGRAM(S): 15 TABLET ORAL at 09:10

## 2021-07-13 NOTE — BH INPATIENT PSYCHIATRY PROGRESS NOTE - NSBHASSESSSUMMFT_PSY_ALL_CORE
Plan:  >Legal: 9.39  >Obs: Routine, no current SI. no need for CO, patient not expected to pose risk to self or others in controlled inpatient setting  >Psychiatric Meds: Titrate Abilify to 20mg PO daily with mouth checks  >Labs: Admission labs reviewed, no acute findings  >Medical: Will discontinue amlodipine as patient is refusing HTN treatment and monitor BP   >Diet: Halal Diet, Regular  >Social: milieu/structured therapy  >Treatment Interventions: Groups and Individual Therapy/CBT, Motivational counseling for substance abuse related issues.   >Dispo: Collateral and dispo planning pending further symptom and medication optimization

## 2021-07-14 PROCEDURE — 99232 SBSQ HOSP IP/OBS MODERATE 35: CPT

## 2021-07-14 RX ORDER — LANOLIN ALCOHOL/MO/W.PET/CERES
5 CREAM (GRAM) TOPICAL AT BEDTIME
Refills: 0 | Status: DISCONTINUED | OUTPATIENT
Start: 2021-07-14 | End: 2021-07-15

## 2021-07-14 RX ADMIN — ARIPIPRAZOLE 20 MILLIGRAM(S): 15 TABLET ORAL at 08:23

## 2021-07-14 RX ADMIN — Medication 100 MILLIGRAM(S): at 14:29

## 2021-07-14 RX ADMIN — Medication 5 MILLIGRAM(S): at 01:49

## 2021-07-14 RX ADMIN — Medication 650 MILLIGRAM(S): at 01:49

## 2021-07-14 RX ADMIN — Medication 2 MILLIGRAM(S): at 14:29

## 2021-07-14 NOTE — BH INPATIENT PSYCHIATRY PROGRESS NOTE - NSBHASSESSSUMMFT_PSY_ALL_CORE
Plan:  >Legal: 9.39  >Obs: Routine, no current SI. no need for CO, patient not expected to pose risk to self or others in controlled inpatient setting  >Psychiatric Meds: Titrate Abilify to 20mg PO daily with mouth checks, recommend BOWENS prior to discharge  >Labs: Admission labs reviewed, no acute findings  >Medical: Will discontinue amlodipine as patient is refusing HTN treatment and monitor BP   >Diet: Halal Diet, Regular  >Social: milieu/structured therapy  >Treatment Interventions: Groups and Individual Therapy/CBT, Motivational counseling for substance abuse related issues.   >Dispo: Collateral and dispo planning pending further symptom and medication optimization

## 2021-07-15 PROCEDURE — 99232 SBSQ HOSP IP/OBS MODERATE 35: CPT

## 2021-07-15 RX ORDER — TRAZODONE HCL 50 MG
50 TABLET ORAL AT BEDTIME
Refills: 0 | Status: DISCONTINUED | OUTPATIENT
Start: 2021-07-15 | End: 2021-07-21

## 2021-07-15 RX ADMIN — Medication 650 MILLIGRAM(S): at 22:33

## 2021-07-15 RX ADMIN — Medication 5 MILLIGRAM(S): at 01:38

## 2021-07-15 RX ADMIN — Medication 650 MILLIGRAM(S): at 21:16

## 2021-07-15 RX ADMIN — ARIPIPRAZOLE 20 MILLIGRAM(S): 15 TABLET ORAL at 08:17

## 2021-07-15 RX ADMIN — Medication 100 MILLIGRAM(S): at 01:38

## 2021-07-15 RX ADMIN — Medication 50 MILLIGRAM(S): at 21:16

## 2021-07-15 RX ADMIN — Medication 400 MILLIGRAM(S): at 16:53

## 2021-07-16 PROCEDURE — 99232 SBSQ HOSP IP/OBS MODERATE 35: CPT

## 2021-07-16 RX ORDER — PALIPERIDONE 1.5 MG/1
3 TABLET, EXTENDED RELEASE ORAL AT BEDTIME
Refills: 0 | Status: DISCONTINUED | OUTPATIENT
Start: 2021-07-16 | End: 2021-07-19

## 2021-07-16 RX ORDER — POLYETHYLENE GLYCOL 3350 17 G/17G
17 POWDER, FOR SOLUTION ORAL ONCE
Refills: 0 | Status: COMPLETED | OUTPATIENT
Start: 2021-07-16 | End: 2021-07-16

## 2021-07-16 RX ORDER — ARIPIPRAZOLE 15 MG/1
15 TABLET ORAL AT BEDTIME
Refills: 0 | Status: DISCONTINUED | OUTPATIENT
Start: 2021-07-17 | End: 2021-07-19

## 2021-07-16 RX ADMIN — PALIPERIDONE 3 MILLIGRAM(S): 1.5 TABLET, EXTENDED RELEASE ORAL at 20:35

## 2021-07-16 RX ADMIN — Medication 2 MILLIGRAM(S): at 08:41

## 2021-07-16 RX ADMIN — Medication 400 MILLIGRAM(S): at 20:36

## 2021-07-16 RX ADMIN — Medication 650 MILLIGRAM(S): at 08:12

## 2021-07-16 RX ADMIN — POLYETHYLENE GLYCOL 3350 17 GRAM(S): 17 POWDER, FOR SOLUTION ORAL at 00:04

## 2021-07-16 RX ADMIN — Medication 50 MILLIGRAM(S): at 20:36

## 2021-07-16 RX ADMIN — Medication 400 MILLIGRAM(S): at 21:36

## 2021-07-16 RX ADMIN — Medication 2 MILLIGRAM(S): at 20:36

## 2021-07-16 RX ADMIN — ARIPIPRAZOLE 20 MILLIGRAM(S): 15 TABLET ORAL at 08:12

## 2021-07-16 RX ADMIN — Medication 100 MILLIGRAM(S): at 02:07

## 2021-07-16 NOTE — BH INPATIENT PSYCHIATRY PROGRESS NOTE - NSBHASSESSSUMMFT_PSY_ALL_CORE
Plan:  >Legal: 9.39  >Obs: Routine, no current SI. no need for CO, patient not expected to pose risk to self or others in controlled inpatient setting  >Psychiatric Meds: Taper down Abilify to 15mg PO at bedtime for tomorrow and add Invega 3mg PO at bedtime with plan to cross titrate.  Discussed patient current presentation and history with inpatient director, Dr. Meza and recommended switching medication as patient still symptomatic and cannot come up with safe discharge plan  >Labs: Admission labs reviewed, no acute findings  >Medical: Will discontinue amlodipine as patient is refusing HTN treatment and monitor BP   >Diet: Halal Diet, Regular  >Social: milieu/structured therapy  >Treatment Interventions: Groups and Individual Therapy/CBT, Motivational counseling for substance abuse related issues.   >Dispo: Collateral and dispo planning pending further symptom and medication optimization

## 2021-07-17 RX ADMIN — PALIPERIDONE 3 MILLIGRAM(S): 1.5 TABLET, EXTENDED RELEASE ORAL at 21:24

## 2021-07-17 RX ADMIN — ARIPIPRAZOLE 15 MILLIGRAM(S): 15 TABLET ORAL at 21:24

## 2021-07-17 RX ADMIN — Medication 50 MILLIGRAM(S): at 21:24

## 2021-07-17 RX ADMIN — Medication 2 MILLIGRAM(S): at 14:12

## 2021-07-17 RX ADMIN — Medication 2 MILLIGRAM(S): at 21:24

## 2021-07-18 RX ADMIN — Medication 100 MILLIGRAM(S): at 09:33

## 2021-07-18 RX ADMIN — Medication 100 MILLIGRAM(S): at 20:50

## 2021-07-18 RX ADMIN — Medication 650 MILLIGRAM(S): at 03:30

## 2021-07-18 RX ADMIN — Medication 2 MILLIGRAM(S): at 09:33

## 2021-07-18 RX ADMIN — Medication 50 MILLIGRAM(S): at 20:50

## 2021-07-18 RX ADMIN — ARIPIPRAZOLE 15 MILLIGRAM(S): 15 TABLET ORAL at 20:50

## 2021-07-18 RX ADMIN — Medication 2 MILLIGRAM(S): at 20:50

## 2021-07-18 RX ADMIN — Medication 650 MILLIGRAM(S): at 20:50

## 2021-07-18 RX ADMIN — PALIPERIDONE 3 MILLIGRAM(S): 1.5 TABLET, EXTENDED RELEASE ORAL at 20:50

## 2021-07-18 RX ADMIN — Medication 650 MILLIGRAM(S): at 21:50

## 2021-07-19 PROCEDURE — 99232 SBSQ HOSP IP/OBS MODERATE 35: CPT

## 2021-07-19 PROCEDURE — 99223 1ST HOSP IP/OBS HIGH 75: CPT

## 2021-07-19 RX ORDER — PALIPERIDONE 1.5 MG/1
6 TABLET, EXTENDED RELEASE ORAL AT BEDTIME
Refills: 0 | Status: DISCONTINUED | OUTPATIENT
Start: 2021-07-19 | End: 2021-07-26

## 2021-07-19 RX ORDER — ARIPIPRAZOLE 15 MG/1
10 TABLET ORAL AT BEDTIME
Refills: 0 | Status: DISCONTINUED | OUTPATIENT
Start: 2021-07-19 | End: 2021-07-21

## 2021-07-19 RX ADMIN — Medication 400 MILLIGRAM(S): at 11:10

## 2021-07-19 RX ADMIN — Medication 50 MILLIGRAM(S): at 20:20

## 2021-07-19 RX ADMIN — Medication 2 MILLIGRAM(S): at 20:53

## 2021-07-19 RX ADMIN — Medication 2 MILLIGRAM(S): at 13:28

## 2021-07-19 RX ADMIN — ARIPIPRAZOLE 10 MILLIGRAM(S): 15 TABLET ORAL at 20:20

## 2021-07-19 RX ADMIN — Medication 100 MILLIGRAM(S): at 20:20

## 2021-07-19 RX ADMIN — Medication 400 MILLIGRAM(S): at 12:53

## 2021-07-19 NOTE — BH INPATIENT PSYCHIATRY PROGRESS NOTE - NSBHASSESSSUMMFT_PSY_ALL_CORE
Plan:  >Legal: 9.39  >Obs: Routine, no current SI. no need for CO, patient not expected to pose risk to self or others in controlled inpatient setting  >Psychiatric Meds: Taper down Abilify to 10mg PO at bedtime for tomorrow and add Invega and titrate to 6mg PO at bedtime with plan to cross titrate.    >Labs: Admission labs reviewed, no acute findings  >Medical: Will discontinue amlodipine as patient is refusing HTN treatment and monitor BP   >Diet: Halal Diet, Regular  >Social: milieu/structured therapy  >Treatment Interventions: Groups and Individual Therapy/CBT, Motivational counseling for substance abuse related issues.   >Dispo: Collateral and dispo planning pending further symptom and medication optimization

## 2021-07-19 NOTE — CONSULT NOTE ADULT - SUBJECTIVE AND OBJECTIVE BOX
HPI:     20M w/ schizophrenia admitted for psychiatric stabilization.  Called to evaluate pt for R big toe pain and swelling.  Pt states that he first noticed a bruise on his R big toe 4-5 days ago.  Denies any injury to the foot. States he does not have pain or trouble with walking and is able to play basketball w/o issue.   C/o big toe fungal infection, as well as a small blood blister on the lateral aspect of L big toe.   Denies all other complaints.     PAST MEDICAL & SURGICAL HISTORY:  Liver hemangioma    Schizophrenia    Schizophrenia        No significant past surgical history        Review of Systems:   CONSTITUTIONAL: No fever, weight loss, or fatigue  EYES: No eye pain, visual disturbances, or discharge  ENMT:  No difficulty hearing, tinnitus, vertigo; No sinus or throat pain  NECK: No pain or stiffness  RESPIRATORY: No cough, wheezing, chills or hemoptysis; No shortness of breath  CARDIOVASCULAR: No chest pain, palpitations, dizziness, or leg swelling  GASTROINTESTINAL: No abdominal or epigastric pain. No nausea, vomiting, or hematemesis; No diarrhea or constipation. No melena or hematochezia.  GENITOURINARY: No dysuria, frequency, hematuria, or incontinence  NEUROLOGICAL: No headaches, memory loss, loss of strength, numbness, or tremors  SKIN: No itching, burning, rashes, or lesions   LYMPH NODES: No enlarged glands  ENDOCRINE: No heat or cold intolerance; No hair loss  MUSCULOSKELETAL: No joint pain or swelling; No muscle, back, or extremity pain  HEME/LYMPH: No easy bruising, or bleeding gums  ALLERY AND IMMUNOLOGIC: No hives or eczema    Allergies    No Known Allergies    Intolerances        Social History:     FAMILY HISTORY:  No pertinent family history in first degree relatives    No pertinent family history in first degree relatives        MEDICATIONS  (STANDING):  ARIPiprazole 10 milliGRAM(s) Oral at bedtime  paliperidone ER 6 milliGRAM(s) Oral at bedtime  polyethylene glycol 3350 17 Gram(s) Oral once  traZODone 50 milliGRAM(s) Oral at bedtime    MEDICATIONS  (PRN):  acetaminophen    Suspension .. 650 milliGRAM(s) Oral every 6 hours PRN Mild Pain (1 - 3), Moderate Pain (4 - 6)  chlorproMAZINE    Injectable 100 milliGRAM(s) IntraMuscular once PRN agitaiton  chlorproMAZINE    Tablet 100 milliGRAM(s) Oral every 6 hours PRN agitation  ibuprofen  Tablet. 400 milliGRAM(s) Oral every 6 hours PRN Temp greater or equal to 38C (100.4F), Moderate Pain (4 - 6)  LORazepam     Tablet 2 milliGRAM(s) Oral every 6 hours PRN agitation  LORazepam   Injectable 3 milliGRAM(s) IntraMuscular once PRN agitation      Vital Signs Last 24 Hrs  T(C): 36.4 (18 Jul 2021 21:41), Max: 36.4 (18 Jul 2021 21:41)  T(F): 97.6 (18 Jul 2021 21:41), Max: 97.6 (18 Jul 2021 21:41)  HR: --  BP: --  BP(mean): --  RR: --  SpO2: --  CAPILLARY BLOOD GLUCOSE            PHYSICAL EXAM:  GENERAL: NAD, well-developed  HEAD:  Atraumatic, Normocephalic  EYES: EOMI, PERRLA, conjunctiva and sclera clear  NECK: Supple, No JVD  CHEST/LUNG: Clear to auscultation bilaterally; No wheeze  HEART: Regular rate and rhythm; No murmurs, rubs, or gallops  ABDOMEN: Soft, Nontender, Nondistended; Bowel sounds present  EXTREMITIES:  2+ Peripheral Pulses, No clubbing, cyanosis, or edema  PSYCH: AAOx3  NEUROLOGY: non-focal  SKIN: No rashes or lesions    LABS:                    EKG(personally reviewed):    RADIOLOGY & ADDITIONAL TESTS:    Imaging Personally Reviewed:    Consultant(s) Notes Reviewed:      Care Discussed with Consultants/Other Providers: NP Knock    HPI:     20M w/ schizophrenia admitted for psychiatric stabilization.  Called to evaluate pt for R big toe pain and swelling.  Pt states that he first noticed a bruise on his R big toe 4-5 days ago.  Denies any injury to the foot. States he does not have pain or trouble with walking and is able to play basketball w/o issue.   C/o big toe fungal infection, as well as a small blood blister on the lateral aspect of L big toe.   Denies all other complaints.     PAST MEDICAL & SURGICAL HISTORY:  Liver hemangioma    Schizophrenia    Schizophrenia        No significant past surgical history        Review of Systems:   CONSTITUTIONAL: No fever, weight loss, or fatigue  EYES: No eye pain, visual disturbances, or discharge  ENMT:  No difficulty hearing, tinnitus, vertigo; No sinus or throat pain  NECK: No pain or stiffness  RESPIRATORY: No cough, wheezing, chills or hemoptysis; No shortness of breath  CARDIOVASCULAR: No chest pain, palpitations, dizziness, or leg swelling  GASTROINTESTINAL: No abdominal or epigastric pain. No nausea, vomiting, or hematemesis; No diarrhea or constipation. No melena or hematochezia.  GENITOURINARY: No dysuria, frequency, hematuria, or incontinence  NEUROLOGICAL: No headaches, memory loss, loss of strength, numbness, or tremors  SKIN: No itching, burning, rashes, or lesions   LYMPH NODES: No enlarged glands  ENDOCRINE: No heat or cold intolerance; No hair loss  MUSCULOSKELETAL: No joint pain or swelling; No muscle, back, or extremity pain  HEME/LYMPH: No easy bruising, or bleeding gums  ALLERY AND IMMUNOLOGIC: No hives or eczema    Allergies    No Known Allergies    Intolerances        Social History:     FAMILY HISTORY:  No pertinent family history in first degree relatives    No pertinent family history in first degree relatives        MEDICATIONS  (STANDING):  ARIPiprazole 10 milliGRAM(s) Oral at bedtime  paliperidone ER 6 milliGRAM(s) Oral at bedtime  polyethylene glycol 3350 17 Gram(s) Oral once  traZODone 50 milliGRAM(s) Oral at bedtime    MEDICATIONS  (PRN):  acetaminophen    Suspension .. 650 milliGRAM(s) Oral every 6 hours PRN Mild Pain (1 - 3), Moderate Pain (4 - 6)  chlorproMAZINE    Injectable 100 milliGRAM(s) IntraMuscular once PRN agitaiton  chlorproMAZINE    Tablet 100 milliGRAM(s) Oral every 6 hours PRN agitation  ibuprofen  Tablet. 400 milliGRAM(s) Oral every 6 hours PRN Temp greater or equal to 38C (100.4F), Moderate Pain (4 - 6)  LORazepam     Tablet 2 milliGRAM(s) Oral every 6 hours PRN agitation  LORazepam   Injectable 3 milliGRAM(s) IntraMuscular once PRN agitation      Vital Signs Last 24 Hrs  T(C): 36.4 (18 Jul 2021 21:41), Max: 36.4 (18 Jul 2021 21:41)  T(F): 97.6 (18 Jul 2021 21:41), Max: 97.6 (18 Jul 2021 21:41)  HR: --  BP: --  BP(mean): --  RR: --  SpO2: --  CAPILLARY BLOOD GLUCOSE            PHYSICAL EXAM:  GENERAL: NAD, well-developed  HEAD:  Atraumatic, Normocephalic  EYES: EOMI, PERRLA, conjunctiva and sclera clear  NECK: Supple, No JVD  CHEST/LUNG: Clear to auscultation bilaterally; No wheeze  HEART: Regular rate and rhythm; No murmurs, rubs, or gallops  ABDOMEN: Soft, Nontender, Nondistended; Bowel sounds present  EXTREMITIES: R great toe w/ purplish discoloration, but FROM w/o pain, fungal discoloration to nail bed, tiny small blood blister on lateral aspect of L great toe   PSYCH: AAOx3  NEUROLOGY: non-focal  SKIN: No rashes or lesions    LABS:                    EKG(personally reviewed):    RADIOLOGY & ADDITIONAL TESTS:    Imaging Personally Reviewed:    Consultant(s) Notes Reviewed:      Care Discussed with Consultants/Other Providers: NP Knock

## 2021-07-19 NOTE — CONSULT NOTE ADULT - ASSESSMENT
20M w/ schizophrenia w/ R foot bruise/fungal disease  -no evidence of fracture  -pain is controlled  -Podiatry eval for fungal disease  -d/w Psych NP

## 2021-07-20 PROCEDURE — 99232 SBSQ HOSP IP/OBS MODERATE 35: CPT

## 2021-07-20 RX ADMIN — Medication 2 MILLIGRAM(S): at 20:37

## 2021-07-20 RX ADMIN — PALIPERIDONE 6 MILLIGRAM(S): 1.5 TABLET, EXTENDED RELEASE ORAL at 20:37

## 2021-07-20 RX ADMIN — Medication 400 MILLIGRAM(S): at 10:41

## 2021-07-20 RX ADMIN — Medication 50 MILLIGRAM(S): at 20:38

## 2021-07-20 RX ADMIN — Medication 400 MILLIGRAM(S): at 08:50

## 2021-07-20 RX ADMIN — Medication 2 MILLIGRAM(S): at 14:12

## 2021-07-20 RX ADMIN — ARIPIPRAZOLE 10 MILLIGRAM(S): 15 TABLET ORAL at 20:37

## 2021-07-21 PROCEDURE — 99232 SBSQ HOSP IP/OBS MODERATE 35: CPT

## 2021-07-21 RX ORDER — ARIPIPRAZOLE 15 MG/1
5 TABLET ORAL AT BEDTIME
Refills: 0 | Status: COMPLETED | OUTPATIENT
Start: 2021-07-21 | End: 2021-07-22

## 2021-07-21 RX ORDER — TRAZODONE HCL 50 MG
100 TABLET ORAL AT BEDTIME
Refills: 0 | Status: DISCONTINUED | OUTPATIENT
Start: 2021-07-21 | End: 2021-07-29

## 2021-07-21 RX ADMIN — PALIPERIDONE 6 MILLIGRAM(S): 1.5 TABLET, EXTENDED RELEASE ORAL at 21:42

## 2021-07-21 RX ADMIN — ARIPIPRAZOLE 5 MILLIGRAM(S): 15 TABLET ORAL at 21:42

## 2021-07-21 RX ADMIN — Medication 650 MILLIGRAM(S): at 01:54

## 2021-07-21 RX ADMIN — Medication 650 MILLIGRAM(S): at 11:03

## 2021-07-21 RX ADMIN — Medication 2 MILLIGRAM(S): at 04:06

## 2021-07-21 RX ADMIN — Medication 100 MILLIGRAM(S): at 21:42

## 2021-07-21 NOTE — BH INPATIENT PSYCHIATRY PROGRESS NOTE - NSBHASSESSSUMMFT_PSY_ALL_CORE
Plan:  >Legal: 9.39  >Obs: Routine, no current SI. no need for CO, patient not expected to pose risk to self or others in controlled inpatient setting  >Psychiatric Meds: Taper down Abilify to 5mg PO at bedtime and add Invega and titrate to 6mg PO at bedtime with plan to cross titrate.    >Labs: Admission labs reviewed, no acute findings  >Medical: Will discontinue amlodipine as patient is refusing HTN treatment and monitor BP   >Diet: Halal Diet, Regular  >Social: milieu/structured therapy  >Treatment Interventions: Groups and Individual Therapy/CBT, Motivational counseling for substance abuse related issues.   >Dispo: Collateral and dispo planning pending further symptom and medication optimization

## 2021-07-22 PROCEDURE — 99232 SBSQ HOSP IP/OBS MODERATE 35: CPT

## 2021-07-22 RX ADMIN — Medication 400 MILLIGRAM(S): at 17:44

## 2021-07-22 RX ADMIN — Medication 100 MILLIGRAM(S): at 21:12

## 2021-07-22 RX ADMIN — Medication 2 MILLIGRAM(S): at 18:50

## 2021-07-22 RX ADMIN — ARIPIPRAZOLE 5 MILLIGRAM(S): 15 TABLET ORAL at 21:12

## 2021-07-22 RX ADMIN — Medication 400 MILLIGRAM(S): at 00:33

## 2021-07-22 RX ADMIN — Medication 400 MILLIGRAM(S): at 10:56

## 2021-07-22 RX ADMIN — PALIPERIDONE 6 MILLIGRAM(S): 1.5 TABLET, EXTENDED RELEASE ORAL at 21:12

## 2021-07-22 RX ADMIN — Medication 100 MILLIGRAM(S): at 18:50

## 2021-07-22 RX ADMIN — Medication 2 MILLIGRAM(S): at 00:34

## 2021-07-22 RX ADMIN — Medication 400 MILLIGRAM(S): at 01:05

## 2021-07-22 NOTE — PROGRESS NOTE ADULT - ASSESSMENT
Assessment/plan:    Paronychia right hallux medial nail border    Topical anesthetic applied followed by Betadine prep.  Next aseptic slant back of the left hallux medial nail border performed with peroxide flush and bacitracin applied.  Rx for Duricef 500 mg 1 tablet by mouth twice a day for 1 week  Patient review warm water Betadine soap with application of mupirocin ointment and Band-Aid daily for 1 week  Patient to consider phenol and alcohol procedure in the future of chronic recurrence as an outpatient.  Will follow up in 1 week or sooner if problems arise

## 2021-07-22 NOTE — BH TREATMENT PLAN - NSTXMEDICINTERRN_PSY_ALL_CORE
Educate patiento on the details of his medication regimen with each medication pass.
Educate patient on prescribed medications, its indications, dosages, and times of administration.

## 2021-07-22 NOTE — BH TREATMENT PLAN - NSTXDCFAMINTERSW_PSY_ALL_CORE
Family meeting was held to work on conflicts within the family.
PT will agreed to family participation with discharge plans
Patient will agreed to family meeting
Patient will agreed to family meeting

## 2021-07-22 NOTE — BH TREATMENT PLAN - NSTXDISORGGOAL_PSY_ALL_CORE
Will identify 2 coping skills that assist in organizing
Will identify 2 coping skills that assist in organizing
Will make at least 3 goal and reality oriented statements during therapy
Will make at least 3 goal and reality oriented statements during therapy

## 2021-07-22 NOTE — BH TREATMENT PLAN - NSDCCRITERIA_PSY_ALL_CORE
Symptom stabilization  CGI<=3

## 2021-07-22 NOTE — BH TREATMENT PLAN - NSTXPATIENTPARTICIPATE_PSY_ALL_CORE
Patient participated in defining interventions
Patient participated in identification of needs/problems/goals for treatment/Patient participated in defining interventions/Patient participated in development of after care plan

## 2021-07-22 NOTE — BH TREATMENT PLAN - NSTXPROBDCFAM_PSY_ALL_CORE
DISCHARGE ISSUE - POOR ENGAGEMENT WITH SUPPORTS/FAMILY

## 2021-07-22 NOTE — PROGRESS NOTE ADULT - SUBJECTIVE AND OBJECTIVE BOX
Podiatry pager #: [phone]/ [phone]    Patient is a 20y old  Male who presents with a chief complaint of Painful tender ingrowing right big toenail present for 2 weeks getting progressively worse.  Patient relates no fever chills night sweats nausea vomiting shortness of breath or calf tenderness.  Patient presents with Band-Aid over the toe.    HPI:      PAST MEDICAL & SURGICAL HISTORY:  Liver hemangioma    Schizophrenia    Schizophrenia    No significant past surgical history    No significant past surgical history        MEDICATIONS  (STANDING):  paliperidone ER 6 milliGRAM(s) Oral at bedtime  polyethylene glycol 3350 17 Gram(s) Oral once  traZODone 100 milliGRAM(s) Oral at bedtime    MEDICATIONS  (PRN):  acetaminophen    Suspension .. 650 milliGRAM(s) Oral every 6 hours PRN Mild Pain (1 - 3), Moderate Pain (4 - 6)  chlorproMAZINE    Injectable 100 milliGRAM(s) IntraMuscular once PRN agitaiton  chlorproMAZINE    Tablet 100 milliGRAM(s) Oral every 6 hours PRN agitation  ibuprofen  Tablet. 400 milliGRAM(s) Oral every 6 hours PRN Temp greater or equal to 38C (100.4F), Moderate Pain (4 - 6)  LORazepam     Tablet 2 milliGRAM(s) Oral every 6 hours PRN agitation  LORazepam   Injectable 3 milliGRAM(s) IntraMuscular once PRN agitation      Allergies    No Known Allergies    Intolerances        VITALS:    Vital Signs Last 24 Hrs  T(C): 37 (23 Jul 2021 06:27), Max: 37 (23 Jul 2021 06:27)  T(F): 98.6 (23 Jul 2021 06:27), Max: 98.6 (23 Jul 2021 06:27)  HR: --  BP: --  BP(mean): --  RR: --  SpO2: --    LABS:                CAPILLARY BLOOD GLUCOSE              LOWER EXTREMITY PHYSICAL EXAM:    Vascular: DP/PT _2/4, B/L, CFT <_2 seconds B/L, Temperature gradient _WNL, B/L.   Neuro: Epicritic sensation Intact_ to the level of _Toes, B/L.  Musculoskeletal/Ortho: Bunion bilateral  Skin: Right hallux medial nail border positive ingrown/incurvated border/distal spicule.  Positive mild localized erythema and edema to the medial nail fold.  No active purulence no fluctuance or clinical signs of a sending cellulitis at this time.    RADIOLOGY & ADDITIONAL STUDIES:

## 2021-07-22 NOTE — BH TREATMENT PLAN - NSTXMEDICGOAL_PSY_ALL_CORE
Take all medications as prescribed
Be able to state dosages and times to take medications
Take all medications as prescribed
Be able to describe the benefit of medication/treatment

## 2021-07-22 NOTE — BH TREATMENT PLAN - NSTXDISORGINTERRN_PSY_ALL_CORE
Educate patient on positive coping skills that assist in organizing thoughts.
Encourage pt voice concerns and feelings to staff, redirect as needed.
Encourage pt to notify staff for (+) AH and request PRN medication as needed.

## 2021-07-22 NOTE — BH TREATMENT PLAN - NSTXDCFAMGOAL_PSY_ALL_CORE
Will agree to involve supports/family in treatment and discharge planning

## 2021-07-22 NOTE — BH TREATMENT PLAN - NSTXSUBMISGOAL_PSY_ALL_CORE
Be able to acknowledge that substance abuse is a problem
Be able to acknowledge that substance abuse is a problem
Accept referral for substance abuse treatment on discharge
Be able to verbalize an understanding of the association between substance abuse and mental health

## 2021-07-22 NOTE — BH TREATMENT PLAN - NSTXIMPULSGOAL_PSY_ALL_CORE
Will be able to describe/demonstrate alternative ways of managing his/her emotional state on the unit
Will be able to describe/demonstrate alternative ways of managing his/her emotional state on the unit

## 2021-07-22 NOTE — BH TREATMENT PLAN - NSTXSUICIDGOAL_PSY_ALL_CORE
Be able to state 3 reasons for living
Talk to staff and ask for assistance when having suicidal wishes instead of acting out
Be able to state 3 reasons for living
Will identify and utilize 2 coping skills

## 2021-07-22 NOTE — BH TREATMENT PLAN - NSTXSUBMISINTERRN_PSY_ALL_CORE
Encourage patient to attend substance misuse meeting. Provide substance misuse education
Educate patient on positive coping skills.
Encourage patient to attend substance misuse meeting. Provide substance misuse education

## 2021-07-23 PROCEDURE — 99232 SBSQ HOSP IP/OBS MODERATE 35: CPT

## 2021-07-23 RX ORDER — MUPIROCIN 20 MG/G
1 OINTMENT TOPICAL ONCE
Refills: 0 | Status: COMPLETED | OUTPATIENT
Start: 2021-07-23 | End: 2021-07-23

## 2021-07-23 RX ADMIN — Medication 2 MILLIGRAM(S): at 08:36

## 2021-07-23 RX ADMIN — Medication 1 TABLET(S): at 20:57

## 2021-07-23 RX ADMIN — PALIPERIDONE 6 MILLIGRAM(S): 1.5 TABLET, EXTENDED RELEASE ORAL at 20:58

## 2021-07-23 RX ADMIN — MUPIROCIN 1 APPLICATION(S): 20 OINTMENT TOPICAL at 21:07

## 2021-07-23 RX ADMIN — Medication 400 MILLIGRAM(S): at 11:01

## 2021-07-23 RX ADMIN — Medication 400 MILLIGRAM(S): at 15:10

## 2021-07-23 RX ADMIN — Medication 2 MILLIGRAM(S): at 21:01

## 2021-07-23 RX ADMIN — Medication 100 MILLIGRAM(S): at 20:58

## 2021-07-23 NOTE — BH INPATIENT PSYCHIATRY PROGRESS NOTE - NSBHASSESSSUMMFT_PSY_ALL_CORE
Plan:  >Legal: 9.39  >Obs: Routine, no current SI. no need for CO, patient not expected to pose risk to self or others in controlled inpatient setting  >Psychiatric Meds: Invega and titrate to 6mg PO at bedtime.    >Labs: Admission labs reviewed, no acute findings  >Medical: Will discontinue amlodipine as patient is refusing HTN treatment and monitor BP   >Diet: Halal Diet, Regular  >Social: milieu/structured therapy  >Treatment Interventions: Groups and Individual Therapy/CBT, Motivational counseling for substance abuse related issues.   >Dispo: Collateral and dispo planning pending further symptom and medication optimization

## 2021-07-24 RX ADMIN — Medication 1 TABLET(S): at 08:33

## 2021-07-24 RX ADMIN — Medication 100 MILLIGRAM(S): at 21:01

## 2021-07-24 RX ADMIN — Medication 2 MILLIGRAM(S): at 13:29

## 2021-07-24 RX ADMIN — PALIPERIDONE 6 MILLIGRAM(S): 1.5 TABLET, EXTENDED RELEASE ORAL at 21:01

## 2021-07-24 RX ADMIN — Medication 1 TABLET(S): at 21:01

## 2021-07-25 RX ADMIN — Medication 1 TABLET(S): at 08:17

## 2021-07-25 RX ADMIN — PALIPERIDONE 6 MILLIGRAM(S): 1.5 TABLET, EXTENDED RELEASE ORAL at 20:29

## 2021-07-25 RX ADMIN — Medication 2 MILLIGRAM(S): at 20:29

## 2021-07-25 RX ADMIN — Medication 1 TABLET(S): at 20:29

## 2021-07-25 RX ADMIN — Medication 100 MILLIGRAM(S): at 20:29

## 2021-07-26 PROCEDURE — 99232 SBSQ HOSP IP/OBS MODERATE 35: CPT

## 2021-07-26 RX ORDER — PALIPERIDONE 1.5 MG/1
9 TABLET, EXTENDED RELEASE ORAL AT BEDTIME
Refills: 0 | Status: DISCONTINUED | OUTPATIENT
Start: 2021-07-26 | End: 2021-07-29

## 2021-07-26 RX ADMIN — PALIPERIDONE 9 MILLIGRAM(S): 1.5 TABLET, EXTENDED RELEASE ORAL at 20:58

## 2021-07-26 RX ADMIN — Medication 100 MILLIGRAM(S): at 20:59

## 2021-07-26 RX ADMIN — Medication 2 MILLIGRAM(S): at 20:59

## 2021-07-26 RX ADMIN — Medication 1 TABLET(S): at 20:58

## 2021-07-26 RX ADMIN — Medication 1 TABLET(S): at 08:58

## 2021-07-26 NOTE — BH INPATIENT PSYCHIATRY PROGRESS NOTE - NSBHASSESSSUMMFT_PSY_ALL_CORE
Patient remains guarded and has very little insight into his illness. He denies any SI/HI or AH.    c/w current medications, titrate Invega

## 2021-07-27 PROCEDURE — 99232 SBSQ HOSP IP/OBS MODERATE 35: CPT

## 2021-07-27 PROCEDURE — 93010 ELECTROCARDIOGRAM REPORT: CPT

## 2021-07-27 RX ADMIN — Medication 100 MILLIGRAM(S): at 20:48

## 2021-07-27 RX ADMIN — Medication 2 MILLIGRAM(S): at 20:48

## 2021-07-27 RX ADMIN — PALIPERIDONE 9 MILLIGRAM(S): 1.5 TABLET, EXTENDED RELEASE ORAL at 20:48

## 2021-07-27 RX ADMIN — Medication 1 TABLET(S): at 20:48

## 2021-07-27 RX ADMIN — Medication 1 TABLET(S): at 08:36

## 2021-07-27 NOTE — BH INPATIENT PSYCHIATRY PROGRESS NOTE - NSBHASSESSSUMMFT_PSY_ALL_CORE
Patient remains guarded and has very little insight into his illness. He denies any SI/HI or AH.    c/w current medications

## 2021-07-28 PROCEDURE — 99232 SBSQ HOSP IP/OBS MODERATE 35: CPT

## 2021-07-28 RX ORDER — TRAZODONE HCL 50 MG
1 TABLET ORAL
Qty: 30 | Refills: 0
Start: 2021-07-28 | End: 2021-08-26

## 2021-07-28 RX ORDER — TRAZODONE HCL 50 MG
1 TABLET ORAL
Qty: 0 | Refills: 0 | DISCHARGE
Start: 2021-07-28

## 2021-07-28 RX ORDER — ARIPIPRAZOLE 15 MG/1
1 TABLET ORAL
Qty: 0 | Refills: 0 | DISCHARGE

## 2021-07-28 RX ORDER — PALIPERIDONE 1.5 MG/1
1 TABLET, EXTENDED RELEASE ORAL
Qty: 0 | Refills: 0 | DISCHARGE
Start: 2021-07-28

## 2021-07-28 RX ORDER — PALIPERIDONE 1.5 MG/1
1 TABLET, EXTENDED RELEASE ORAL
Qty: 30 | Refills: 0
Start: 2021-07-28 | End: 2021-08-26

## 2021-07-28 RX ADMIN — Medication 1 TABLET(S): at 08:14

## 2021-07-28 RX ADMIN — Medication 100 MILLIGRAM(S): at 20:42

## 2021-07-28 RX ADMIN — Medication 1 TABLET(S): at 20:43

## 2021-07-28 RX ADMIN — PALIPERIDONE 9 MILLIGRAM(S): 1.5 TABLET, EXTENDED RELEASE ORAL at 20:42

## 2021-07-28 NOTE — BH INPATIENT PSYCHIATRY PROGRESS NOTE - NSBHASSESSSUMMFT_PSY_ALL_CORE
Patient remains guarded and has very little insight into his illness but denies any SI/HI or AH.  He is willing to comply with medication.    c/w current medications

## 2021-07-29 VITALS — TEMPERATURE: 98 F | DIASTOLIC BLOOD PRESSURE: 85 MMHG | SYSTOLIC BLOOD PRESSURE: 144 MMHG | HEART RATE: 96 BPM

## 2021-07-29 RX ADMIN — Medication 1 TABLET(S): at 08:19

## 2021-07-29 RX ADMIN — Medication 400 MILLIGRAM(S): at 04:28

## 2021-07-29 NOTE — BH INPATIENT PSYCHIATRY PROGRESS NOTE - NSTXSUICIDGOAL_PSY_ALL_CORE
Be able to state 3 reasons for living
Be able to state 3 reasons for living
Will identify and utilize 2 coping skills
Be able to state 3 reasons for living
Talk to staff and ask for assistance when having suicidal wishes instead of acting out
Be able to state 3 reasons for living
Talk to staff and ask for assistance when having suicidal wishes instead of acting out
Will identify and utilize 2 coping skills
Will identify and utilize 2 coping skills
Be able to state 3 reasons for living
Will identify and utilize 2 coping skills
Be able to state 3 reasons for living
Talk to staff and ask for assistance when having suicidal wishes instead of acting out
Will identify and utilize 2 coping skills
Talk to staff and ask for assistance when having suicidal wishes instead of acting out
Be able to state 3 reasons for living
Be able to state 3 reasons for living
Will identify and utilize 2 coping skills

## 2021-07-29 NOTE — BH INPATIENT PSYCHIATRY PROGRESS NOTE - NSTXIMPULSPROGRES_PSY_ALL_CORE
Improving
Met - goal discontinued
Improving

## 2021-07-29 NOTE — BH DISCHARGE NOTE NURSING/SOCIAL WORK/PSYCH REHAB - PATIENT PORTAL LINK FT
You can access the FollowMyHealth Patient Portal offered by [facility] by registering at the following website: [email or web address]. By joining [employer] [facility]’s FollowMyHealth portal, you will also be able to view your health information using other applications (apps) compatible with our system.

## 2021-07-29 NOTE — BH INPATIENT PSYCHIATRY PROGRESS NOTE - NSTXDISORGDATEEST_PSY_ALL_CORE
07-Jul-2021
02-Jul-2021
07-Jul-2021
02-Jul-2021
07-Jul-2021
28-Jul-2021
02-Jul-2021
07-Jul-2021
07-Jul-2021
02-Jul-2021
02-Jul-2021

## 2021-07-29 NOTE — BH INPATIENT PSYCHIATRY PROGRESS NOTE - NSTXDCFAMGOAL_PSY_ALL_CORE
Will agree to involve supports/family in treatment and discharge planning

## 2021-07-29 NOTE — BH INPATIENT PSYCHIATRY PROGRESS NOTE - NSTXPROBDCFAM_PSY_ALL_CORE
DISCHARGE ISSUE - POOR ENGAGEMENT WITH SUPPORTS/FAMILY

## 2021-07-29 NOTE — BH INPATIENT PSYCHIATRY PROGRESS NOTE - NSTXMEDICPROGRES_PSY_ALL_CORE
No Change
Worsening
Improving
Met - goal discontinued
Improving
Improving
Worsening
No Change
Worsening
Improving
No Change
Improving
No Change
Improving
Improving

## 2021-07-29 NOTE — BH INPATIENT PSYCHIATRY PROGRESS NOTE - NSBHMSETHTPROC_PSY_A_CORE
Perseverative/Illogical/Impaired reasoning/Other
Impaired reasoning
Linear/Impaired reasoning/Other
Illogical/Impaired reasoning
Illogical/Impaired reasoning
Disorganized/Illogical/Impaired reasoning
Illogical/Impaired reasoning/Other
Illogical/Impaired reasoning
Impaired reasoning
Other
Illogical/Impaired reasoning
Illogical/Impaired reasoning
Other
Illogical/Impaired reasoning
Perseverative/Illogical/Impaired reasoning/Other
Disorganized/Illogical/Impaired reasoning
Illogical/Impaired reasoning
Linear/Impaired reasoning/Other

## 2021-07-29 NOTE — BH INPATIENT PSYCHIATRY DISCHARGE NOTE - NSBHDCRISKMITIGATE_PSY_ALL_CORE
Safety planning Safety planning/Family/Other social support involvement/Assisted outpatient treatment

## 2021-07-29 NOTE — BH INPATIENT PSYCHIATRY PROGRESS NOTE - NSTXPROBIMPULS_PSY_ALL_CORE
IMPULSIVITY/AGITATION

## 2021-07-29 NOTE — BH INPATIENT PSYCHIATRY PROGRESS NOTE - NSTXPROBSUBMIS_PSY_ALL_CORE
SUBSTANCE MISUSE

## 2021-07-29 NOTE — BH INPATIENT PSYCHIATRY PROGRESS NOTE - NSDCCRITERIA_PSY_ALL_CORE
Symptom stabilization  CGI<=3

## 2021-07-29 NOTE — BH INPATIENT PSYCHIATRY PROGRESS NOTE - NSTXPROBSUICID_PSY_ALL_CORE
SUICIDE/SELF-INJURIOUS BEHAVIOR

## 2021-07-29 NOTE — BH INPATIENT PSYCHIATRY PROGRESS NOTE - NSTXSUBMISGOAL_PSY_ALL_CORE
Be able to verbalize an understanding of the association between substance abuse and mental health
Be able to acknowledge that substance abuse is a problem
Be able to acknowledge that substance abuse is a problem
Accept referral for substance abuse treatment on discharge
Be able to acknowledge that substance abuse is a problem
Be able to verbalize an understanding of the association between substance abuse and mental health
Accept referral for substance abuse treatment on discharge
Be able to verbalize an understanding of the association between substance abuse and mental health
Be able to acknowledge that substance abuse is a problem
Be able to acknowledge that substance abuse is a problem
Be able to verbalize an understanding of the association between substance abuse and mental health
Be able to acknowledge that substance abuse is a problem
Accept referral for substance abuse treatment on discharge
Be able to acknowledge that substance abuse is a problem
Be able to acknowledge that substance abuse is a problem
Be able to verbalize an understanding of the association between substance abuse and mental health
Accept referral for substance abuse treatment on discharge
Accept referral for substance abuse treatment on discharge
Be able to acknowledge that substance abuse is a problem
Be able to verbalize an understanding of the association between substance abuse and mental health

## 2021-07-29 NOTE — BH INPATIENT PSYCHIATRY PROGRESS NOTE - NSBHMSEIMPULSE_PSY_A_CORE
Normal
Impaired
Normal
Other
Normal
Other
Other
Normal
Impaired
Normal

## 2021-07-29 NOTE — BH INPATIENT PSYCHIATRY DISCHARGE NOTE - DESCRIPTION
dropped out of [employer] college, lives at home with family, unemployed, takes kickboxing classes currently
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
## 2021-07-29 NOTE — BH INPATIENT PSYCHIATRY PROGRESS NOTE - NSBHMSEMOOD_PSY_A_CORE
Normal
Normal/Other
Normal
Normal/Other
Normal
Normal/Other

## 2021-07-29 NOTE — BH INPATIENT PSYCHIATRY PROGRESS NOTE - NSBHCHARTREVIEWVS_PSY_A_CORE FT
Vital Signs Last 24 Hrs  T(C): 36.4 (05 Jul 2021 06:22), Max: 36.5 (04 Jul 2021 20:52)  T(F): 97.5 (05 Jul 2021 06:22), Max: 97.7 (04 Jul 2021 20:52)  HR: --  BP: --  BP(mean): --  RR: --  SpO2: --
Vital Signs Last 24 Hrs  T(C): 36.5 (29 Jul 2021 06:48), Max: 36.5 (29 Jul 2021 06:48)  T(F): 97.7 (29 Jul 2021 06:48), Max: 97.7 (29 Jul 2021 06:48)  HR: 96 (29 Jul 2021 06:48) (96 - 96)  BP: 144/85 (29 Jul 2021 06:48) (144/85 - 144/85)  BP(mean): --  RR: --  SpO2: --
Vital Signs Last 24 Hrs  T(C): 35.7 (06 Jul 2021 06:49), Max: 36.4 (05 Jul 2021 20:30)  T(F): 96.3 (06 Jul 2021 06:49), Max: 97.5 (05 Jul 2021 20:30)  HR: --  BP: --  BP(mean): --  RR: --  SpO2: --
Vital Signs Last 24 Hrs  T(C): 37 (23 Jul 2021 06:27), Max: 37 (23 Jul 2021 06:27)  T(F): 98.6 (23 Jul 2021 06:27), Max: 98.6 (23 Jul 2021 06:27)  HR: --  BP: --  BP(mean): --  RR: --  SpO2: --
Vital Signs Last 24 Hrs  T(C): 36.4 (16 Jul 2021 08:04), Max: 36.4 (15 Jul 2021 20:37)  T(F): 97.6 (16 Jul 2021 08:04), Max: 97.6 (15 Jul 2021 20:37)  HR: 84 (16 Jul 2021 08:04) (84 - 84)  BP: 110/74 (16 Jul 2021 08:04) (110/74 - 110/74)  BP(mean): --  RR: --  SpO2: --
Vital Signs Last 24 Hrs  T(C): 36.4 (07 Jul 2021 06:09), Max: 36.6 (06 Jul 2021 18:26)  T(F): 97.5 (07 Jul 2021 06:09), Max: 97.8 (06 Jul 2021 18:26)  HR: 115 (07 Jul 2021 06:09) (115 - 115)  BP: 125/90 (07 Jul 2021 06:09) (125/90 - 125/90)  BP(mean): --  RR: --  SpO2: --
Vital Signs Last 24 Hrs  T(C): 36.4 (27 Jul 2021 07:01), Max: 36.8 (26 Jul 2021 20:32)  T(F): 97.5 (27 Jul 2021 07:01), Max: 98.2 (26 Jul 2021 20:32)  HR: --  BP: 126/71 (27 Jul 2021 07:01) (126/71 - 126/71)  BP(mean): 104 (27 Jul 2021 07:01) (104 - 104)  RR: --  SpO2: --
Vital Signs Last 24 Hrs  T(C): 36.4 (09 Jul 2021 09:37), Max: 36.7 (09 Jul 2021 06:28)  T(F): 97.6 (09 Jul 2021 09:37), Max: 98 (09 Jul 2021 06:28)  HR: 77 (09 Jul 2021 06:28) (77 - 77)  BP: 142/100 (09 Jul 2021 06:28) (142/100 - 142/100)  BP(mean): --  RR: --  SpO2: 99% (09 Jul 2021 09:37) (99% - 99%)
Vital Signs Last 24 Hrs  T(C): 36.4 (18 Jul 2021 21:41), Max: 36.4 (18 Jul 2021 21:41)  T(F): 97.6 (18 Jul 2021 21:41), Max: 97.6 (18 Jul 2021 21:41)  HR: --  BP: --  BP(mean): --  RR: --  SpO2: --
Vital Signs Last 24 Hrs  T(C): 36.5 (08 Jul 2021 07:51), Max: 36.6 (07 Jul 2021 20:52)  T(F): 97.7 (08 Jul 2021 07:51), Max: 97.8 (07 Jul 2021 20:52)  HR: --  BP: --  BP(mean): --  RR: --  SpO2: --
Vital Signs Last 24 Hrs  T(C): 36.5 (26 Jul 2021 06:32), Max: 36.5 (26 Jul 2021 06:32)  T(F): 97.7 (26 Jul 2021 06:32), Max: 97.7 (26 Jul 2021 06:32)  HR: 104 (26 Jul 2021 06:32) (104 - 104)  BP: 126/80 (26 Jul 2021 06:32) (126/80 - 126/80)  BP(mean): --  RR: --  SpO2: --
Vital Signs Last 24 Hrs  T(C): 36.4 (20 Jul 2021 06:12), Max: 36.5 (19 Jul 2021 18:28)  T(F): 97.5 (20 Jul 2021 06:12), Max: 97.7 (19 Jul 2021 18:28)  HR: 91 (20 Jul 2021 06:12) (91 - 91)  BP: 136/89 (20 Jul 2021 06:12) (136/89 - 136/89)  BP(mean): --  RR: --  SpO2: --
Vital Signs Last 24 Hrs  T(C): 36.6 (04 Jul 2021 07:59), Max: 37 (03 Jul 2021 21:09)  T(F): 97.8 (04 Jul 2021 07:59), Max: 98.6 (03 Jul 2021 21:09)  HR: 124 (04 Jul 2021 07:59) (124 - 124)  BP: 154/96 (04 Jul 2021 07:59) (154/96 - 154/96)  BP(mean): --  RR: --  SpO2: --
Vital Signs Last 24 Hrs  T(C): 36.5 (21 Jul 2021 06:51), Max: 36.5 (21 Jul 2021 06:51)  T(F): 97.7 (21 Jul 2021 06:51), Max: 97.7 (21 Jul 2021 06:51)  HR: --  BP: --  BP(mean): --  RR: --  SpO2: --
Vital Signs Last 24 Hrs  T(C): 36.6 (12 Jul 2021 06:00), Max: 36.6 (12 Jul 2021 06:00)  T(F): 97.8 (12 Jul 2021 06:00), Max: 97.8 (12 Jul 2021 06:00)  HR: 95 (12 Jul 2021 06:00) (95 - 95)  BP: 138/94 (12 Jul 2021 06:00) (138/94 - 138/94)  BP(mean): --  RR: --  SpO2: --
Vital Signs Last 24 Hrs  T(C): 36.1 (28 Jul 2021 06:44), Max: 36.6 (27 Jul 2021 20:26)  T(F): 96.9 (28 Jul 2021 06:44), Max: 97.8 (27 Jul 2021 20:26)  HR: 90 (28 Jul 2021 06:44) (90 - 90)  BP: 132/98 (28 Jul 2021 06:44) (132/98 - 132/98)  BP(mean): --  RR: --  SpO2: --
Vital Signs Last 24 Hrs  T(C): 36.1 (13 Jul 2021 06:31), Max: 36.4 (12 Jul 2021 20:36)  T(F): 97 (13 Jul 2021 06:31), Max: 97.5 (12 Jul 2021 20:36)  HR: 95 (13 Jul 2021 06:31) (95 - 95)  BP: 142/92 (13 Jul 2021 06:31) (142/92 - 142/92)  BP(mean): --  RR: --  SpO2: --
Vital Signs Last 24 Hrs  T(C): 36.5 (14 Jul 2021 06:38), Max: 36.9 (13 Jul 2021 20:23)  T(F): 97.7 (14 Jul 2021 06:38), Max: 98.5 (13 Jul 2021 20:23)  HR: --  BP: --  BP(mean): --  RR: --  SpO2: --
Vital Signs Last 24 Hrs  T(C): 36.2 (22 Jul 2021 07:53), Max: 37.1 (21 Jul 2021 20:43)  T(F): 97.1 (22 Jul 2021 07:53), Max: 98.7 (21 Jul 2021 20:43)  HR: --  BP: --  BP(mean): --  RR: --  SpO2: --
Vital Signs Last 24 Hrs  T(C): 36.3 (15 Jul 2021 08:23), Max: 36.3 (15 Jul 2021 08:23)  T(F): 97.4 (15 Jul 2021 08:23), Max: 97.4 (15 Jul 2021 08:23)  HR: --  BP: --  BP(mean): --  RR: --  SpO2: --

## 2021-07-29 NOTE — BH INPATIENT PSYCHIATRY PROGRESS NOTE - NSBHCONSULTIPREASON_PSY_A_CORE
danger to self; mental illness expected to respond to inpatient care
danger to self; mental illness expected to respond to inpatient care
other reason
danger to self; mental illness expected to respond to inpatient care

## 2021-07-29 NOTE — BH INPATIENT PSYCHIATRY PROGRESS NOTE - NSTXSUBMISPROGRES_PSY_ALL_CORE
No Change
Improving
No Change
Improving
Met - goal discontinued
No Change
No Change
Improving
No Change
Improving

## 2021-07-29 NOTE — BH DISCHARGE NOTE NURSING/SOCIAL WORK/PSYCH REHAB - NSCDUDCCRISIS_PSY_A_CORE
[facility]  1 [phone]) [facility] ([phone])  Text "WELL" to [phone]  Website: www.[facility]/.[facility] 1 [phone]) 451-SACY (4643) Website: www.safehorizon.org/.National Suicide Prevention Lifeline 9 (434) 181-0754/.  [facility]  1 (388) LIF[facility] (989-3967)/.  [facility]  [phone] [place] [phone]   Hours:  Monday through Friday from 9 AM to 3 PM/.  [employer] - [facility] Crisis Line  [phone], Option 1

## 2021-07-29 NOTE — BH INPATIENT PSYCHIATRY PROGRESS NOTE - PRN MEDS
MEDICATIONS  (PRN):  acetaminophen    Suspension .. 650 milliGRAM(s) Oral every 6 hours PRN Mild Pain (1 - 3), Moderate Pain (4 - 6)  chlorproMAZINE    Injectable 100 milliGRAM(s) IntraMuscular once PRN agitaiton  chlorproMAZINE    Tablet 100 milliGRAM(s) Oral every 6 hours PRN agitation  ibuprofen  Tablet. 400 milliGRAM(s) Oral every 6 hours PRN Temp greater or equal to 38C (100.4F), Moderate Pain (4 - 6)  LORazepam     Tablet 2 milliGRAM(s) Oral every 6 hours PRN agitation  LORazepam   Injectable 3 milliGRAM(s) IntraMuscular once PRN agitation  
MEDICATIONS  (PRN):  LORazepam     Tablet 2 milliGRAM(s) Oral every 6 hours PRN anxiety  OLANZapine 5 milliGRAM(s) Oral every 6 hours PRN combativeness due to psychosis  OLANZapine Injectable 5 milliGRAM(s) IntraMuscular once PRN combativeness due to psychosis  OLANZapine Injectable 5 milliGRAM(s) IntraMuscular once PRN combativeness due to psychosis  
MEDICATIONS  (PRN):  acetaminophen    Suspension .. 650 milliGRAM(s) Oral every 6 hours PRN Mild Pain (1 - 3), Moderate Pain (4 - 6)  chlorproMAZINE    Injectable 100 milliGRAM(s) IntraMuscular once PRN agitaiton  chlorproMAZINE    Tablet 100 milliGRAM(s) Oral every 6 hours PRN agitation  ibuprofen  Tablet. 400 milliGRAM(s) Oral every 6 hours PRN Temp greater or equal to 38C (100.4F), Moderate Pain (4 - 6)  LORazepam     Tablet 2 milliGRAM(s) Oral every 6 hours PRN agitation  LORazepam   Injectable 2 milliGRAM(s) IntraMuscular once PRN agitation  
MEDICATIONS  (PRN):  acetaminophen    Suspension .. 650 milliGRAM(s) Oral every 6 hours PRN Mild Pain (1 - 3), Moderate Pain (4 - 6)  chlorproMAZINE    Injectable 100 milliGRAM(s) IntraMuscular once PRN agitaiton  chlorproMAZINE    Tablet 100 milliGRAM(s) Oral every 6 hours PRN agitation  ibuprofen  Tablet. 400 milliGRAM(s) Oral every 6 hours PRN Temp greater or equal to 38C (100.4F), Moderate Pain (4 - 6)  LORazepam     Tablet 2 milliGRAM(s) Oral every 6 hours PRN agitation  LORazepam   Injectable 3 milliGRAM(s) IntraMuscular once PRN agitation  
MEDICATIONS  (PRN):  ibuprofen  Tablet. 400 milliGRAM(s) Oral every 6 hours PRN Temp greater or equal to 38C (100.4F), Moderate Pain (4 - 6)  LORazepam     Tablet 2 milliGRAM(s) Oral every 6 hours PRN anxiety  OLANZapine 5 milliGRAM(s) Oral every 6 hours PRN combativeness due to psychosis  OLANZapine Injectable 5 milliGRAM(s) IntraMuscular once PRN combativeness due to psychosis  OLANZapine Injectable 5 milliGRAM(s) IntraMuscular once PRN combativeness due to psychosis  
MEDICATIONS  (PRN):  chlorproMAZINE    Injectable 100 milliGRAM(s) IntraMuscular once PRN agitaiton  chlorproMAZINE    Tablet 100 milliGRAM(s) Oral every 6 hours PRN agitation  ibuprofen  Tablet. 400 milliGRAM(s) Oral every 6 hours PRN Temp greater or equal to 38C (100.4F), Moderate Pain (4 - 6)  LORazepam     Tablet 2 milliGRAM(s) Oral every 6 hours PRN agitation  LORazepam     Tablet 2 milliGRAM(s) Oral every 6 hours PRN agitation  
MEDICATIONS  (PRN):  acetaminophen    Suspension .. 650 milliGRAM(s) Oral every 6 hours PRN Mild Pain (1 - 3), Moderate Pain (4 - 6)  chlorproMAZINE    Injectable 100 milliGRAM(s) IntraMuscular once PRN agitaiton  chlorproMAZINE    Tablet 100 milliGRAM(s) Oral every 6 hours PRN agitation  ibuprofen  Tablet. 400 milliGRAM(s) Oral every 6 hours PRN Temp greater or equal to 38C (100.4F), Moderate Pain (4 - 6)  LORazepam     Tablet 2 milliGRAM(s) Oral every 6 hours PRN agitation  LORazepam     Tablet 2 milliGRAM(s) Oral every 6 hours PRN agitation  LORazepam   Injectable 3 milliGRAM(s) IntraMuscular once PRN agitation  
MEDICATIONS  (PRN):  ibuprofen  Tablet. 400 milliGRAM(s) Oral every 6 hours PRN Temp greater or equal to 38C (100.4F), Moderate Pain (4 - 6)  OLANZapine 5 milliGRAM(s) Oral every 6 hours PRN combativeness due to psychosis  OLANZapine Injectable 5 milliGRAM(s) IntraMuscular once PRN combativeness due to psychosis  
MEDICATIONS  (PRN):  LORazepam     Tablet 2 milliGRAM(s) Oral every 6 hours PRN anxiety  OLANZapine 5 milliGRAM(s) Oral every 6 hours PRN combativeness due to psychosis  OLANZapine Injectable 5 milliGRAM(s) IntraMuscular once PRN combativeness due to psychosis  OLANZapine Injectable 5 milliGRAM(s) IntraMuscular once PRN combativeness due to psychosis  
MEDICATIONS  (PRN):  acetaminophen    Suspension .. 650 milliGRAM(s) Oral every 6 hours PRN Mild Pain (1 - 3), Moderate Pain (4 - 6)  chlorproMAZINE    Injectable 100 milliGRAM(s) IntraMuscular once PRN agitaiton  chlorproMAZINE    Tablet 100 milliGRAM(s) Oral every 6 hours PRN agitation  ibuprofen  Tablet. 400 milliGRAM(s) Oral every 6 hours PRN Temp greater or equal to 38C (100.4F), Moderate Pain (4 - 6)  LORazepam     Tablet 2 milliGRAM(s) Oral every 6 hours PRN agitation  LORazepam   Injectable 2 milliGRAM(s) IntraMuscular once PRN agitation  
MEDICATIONS  (PRN):  acetaminophen    Suspension .. 650 milliGRAM(s) Oral every 6 hours PRN Mild Pain (1 - 3), Moderate Pain (4 - 6)  chlorproMAZINE    Injectable 100 milliGRAM(s) IntraMuscular once PRN agitaiton  chlorproMAZINE    Tablet 100 milliGRAM(s) Oral every 6 hours PRN agitation  ibuprofen  Tablet. 400 milliGRAM(s) Oral every 6 hours PRN Temp greater or equal to 38C (100.4F), Moderate Pain (4 - 6)  LORazepam     Tablet 2 milliGRAM(s) Oral every 6 hours PRN agitation  LORazepam   Injectable 3 milliGRAM(s) IntraMuscular once PRN agitation  
MEDICATIONS  (PRN):  acetaminophen    Suspension .. 650 milliGRAM(s) Oral every 6 hours PRN Mild Pain (1 - 3), Moderate Pain (4 - 6)  chlorproMAZINE    Injectable 100 milliGRAM(s) IntraMuscular once PRN agitaiton  chlorproMAZINE    Tablet 100 milliGRAM(s) Oral every 6 hours PRN agitation  ibuprofen  Tablet. 400 milliGRAM(s) Oral every 6 hours PRN Temp greater or equal to 38C (100.4F), Moderate Pain (4 - 6)  LORazepam     Tablet 2 milliGRAM(s) Oral every 6 hours PRN agitation  LORazepam     Tablet 2 milliGRAM(s) Oral every 6 hours PRN agitation  LORazepam   Injectable 3 milliGRAM(s) IntraMuscular once PRN agitation  
MEDICATIONS  (PRN):  acetaminophen    Suspension .. 650 milliGRAM(s) Oral every 6 hours PRN Mild Pain (1 - 3), Moderate Pain (4 - 6)  chlorproMAZINE    Injectable 100 milliGRAM(s) IntraMuscular once PRN agitaiton  chlorproMAZINE    Tablet 100 milliGRAM(s) Oral every 6 hours PRN agitation  ibuprofen  Tablet. 400 milliGRAM(s) Oral every 6 hours PRN Temp greater or equal to 38C (100.4F), Moderate Pain (4 - 6)  LORazepam     Tablet 2 milliGRAM(s) Oral every 6 hours PRN agitation  LORazepam   Injectable 3 milliGRAM(s) IntraMuscular once PRN agitation  
MEDICATIONS  (PRN):  acetaminophen    Suspension .. 650 milliGRAM(s) Oral every 6 hours PRN Mild Pain (1 - 3), Moderate Pain (4 - 6)  chlorproMAZINE    Injectable 100 milliGRAM(s) IntraMuscular once PRN agitaiton  chlorproMAZINE    Tablet 100 milliGRAM(s) Oral every 6 hours PRN agitation  ibuprofen  Tablet. 400 milliGRAM(s) Oral every 6 hours PRN Temp greater or equal to 38C (100.4F), Moderate Pain (4 - 6)  LORazepam     Tablet 2 milliGRAM(s) Oral every 6 hours PRN agitation  LORazepam   Injectable 3 milliGRAM(s) IntraMuscular once PRN agitation  
MEDICATIONS  (PRN):  ibuprofen  Tablet. 400 milliGRAM(s) Oral every 6 hours PRN Temp greater or equal to 38C (100.4F), Moderate Pain (4 - 6)  OLANZapine 5 milliGRAM(s) Oral every 6 hours PRN combativeness due to psychosis  OLANZapine Injectable 5 milliGRAM(s) IntraMuscular once PRN combativeness due to psychosis  
MEDICATIONS  (PRN):  acetaminophen    Suspension .. 650 milliGRAM(s) Oral every 6 hours PRN Mild Pain (1 - 3), Moderate Pain (4 - 6)  chlorproMAZINE    Injectable 100 milliGRAM(s) IntraMuscular once PRN agitaiton  chlorproMAZINE    Tablet 100 milliGRAM(s) Oral every 6 hours PRN agitation  ibuprofen  Tablet. 400 milliGRAM(s) Oral every 6 hours PRN Temp greater or equal to 38C (100.4F), Moderate Pain (4 - 6)  LORazepam     Tablet 2 milliGRAM(s) Oral every 6 hours PRN agitation  LORazepam   Injectable 3 milliGRAM(s) IntraMuscular once PRN agitation  
MEDICATIONS  (PRN):  acetaminophen    Suspension .. 650 milliGRAM(s) Oral every 6 hours PRN Mild Pain (1 - 3), Moderate Pain (4 - 6)  chlorproMAZINE    Injectable 100 milliGRAM(s) IntraMuscular once PRN agitaiton  chlorproMAZINE    Tablet 100 milliGRAM(s) Oral every 6 hours PRN agitation  ibuprofen  Tablet. 400 milliGRAM(s) Oral every 6 hours PRN Temp greater or equal to 38C (100.4F), Moderate Pain (4 - 6)  LORazepam     Tablet 2 milliGRAM(s) Oral every 6 hours PRN agitation  LORazepam   Injectable 3 milliGRAM(s) IntraMuscular once PRN agitation  melatonin. 5 milliGRAM(s) Oral at bedtime PRN Insomnia  
MEDICATIONS  (PRN):  acetaminophen    Suspension .. 650 milliGRAM(s) Oral every 6 hours PRN Mild Pain (1 - 3), Moderate Pain (4 - 6)  chlorproMAZINE    Injectable 100 milliGRAM(s) IntraMuscular once PRN agitaiton  chlorproMAZINE    Tablet 100 milliGRAM(s) Oral every 6 hours PRN agitation  ibuprofen  Tablet. 400 milliGRAM(s) Oral every 6 hours PRN Temp greater or equal to 38C (100.4F), Moderate Pain (4 - 6)  LORazepam     Tablet 2 milliGRAM(s) Oral every 6 hours PRN agitation  LORazepam   Injectable 3 milliGRAM(s) IntraMuscular once PRN agitation  melatonin. 5 milliGRAM(s) Oral at bedtime PRN Insomnia

## 2021-07-29 NOTE — BH DISCHARGE NOTE NURSING/SOCIAL WORK/PSYCH REHAB - DISCHARGE INSTRUCTIONS AFTERCARE APPOINTMENTS
In order to check the location, date, or time of your aftercare appointment, please refer to your Discharge Instructions Document given to you upon leaving the hospital.  If you have lost the instructions please call [phone]

## 2021-07-29 NOTE — BH INPATIENT PSYCHIATRY PROGRESS NOTE - NSTXDISORGDATETRGT_PSY_ALL_CORE
14-Jul-2021
04-Aug-2021
21-Jul-2021
07-Jul-2021
21-Jul-2021
07-Jul-2021
21-Jul-2021
14-Jul-2021
14-Jul-2021
07-Jul-2021
07-Jul-2021
14-Jul-2021
21-Jul-2021
14-Jul-2021
21-Jul-2021

## 2021-07-29 NOTE — BH INPATIENT PSYCHIATRY DISCHARGE NOTE - NSDCMRMEDTOKEN_GEN_ALL_CORE_FT
amoxicillin-clavulanate 500 mg-125 mg oral tablet: 1 tab(s) orally every 12 hours  paliperidone 9 mg oral tablet, extended release: 1 tab(s) orally once a day (at bedtime)  traZODone 100 mg oral tablet: 1 tab(s) orally once a day (at bedtime)

## 2021-07-29 NOTE — BH INPATIENT PSYCHIATRY PROGRESS NOTE - NSTXMEDICDATEEST_PSY_ALL_CORE
02-Jul-2021
03-Jul-2021
02-Jul-2021
07-Jul-2021
03-Jul-2021
03-Jul-2021
02-Jul-2021
02-Jul-2021
07-Jul-2021
02-Jul-2021
02-Jul-2021
07-Jul-2021
02-Jul-2021
07-Jul-2021
07-Jul-2021
03-Jul-2021
02-Jul-2021

## 2021-07-29 NOTE — BH INPATIENT PSYCHIATRY PROGRESS NOTE - NSBHASSESSSUMMFT_PSY_ALL_CORE
Patient remains guarded and has very little insight into his illness but denies any SI/HI or AH.  He is willing to comply with medication.    c/w current medication and discharge today

## 2021-07-29 NOTE — BH INPATIENT PSYCHIATRY PROGRESS NOTE - NSTXIMPULSDATEEST_PSY_ALL_CORE
12-Jul-2021
02-Jul-2021
12-Jul-2021
12-Jul-2021

## 2021-07-29 NOTE — BH INPATIENT PSYCHIATRY PROGRESS NOTE - NSTXDISORGGOAL_PSY_ALL_CORE
Will make at least 3 goal and reality oriented statements during therapy
Will identify 2 coping skills that assist in organizing
Will identify 2 coping skills that assist in organizing
Will make at least 3 goal and reality oriented statements during therapy
Will identify 2 coping skills that assist in organizing
Will make at least 3 goal and reality oriented statements during therapy
Will identify 2 coping skills that assist in organizing
Will make at least 3 goal and reality oriented statements during therapy
Will identify 2 coping skills that assist in organizing
Will identify 2 coping skills that assist in organizing
Will make at least 3 goal and reality oriented statements during therapy
Will identify 2 coping skills that assist in organizing
Will identify 2 coping skills that assist in organizing
Will make at least 3 goal and reality oriented statements during therapy
Will identify 2 coping skills that assist in organizing
Will make at least 3 goal and reality oriented statements during therapy
Will make at least 3 goal and reality oriented statements during therapy
Will identify 2 coping skills that assist in organizing
Will identify 2 coping skills that assist in organizing
Will make at least 3 goal and reality oriented statements during therapy

## 2021-07-29 NOTE — BH INPATIENT PSYCHIATRY PROGRESS NOTE - NSBHMSEAFFRANGE_PSY_A_CORE
Constricted
Labile
Constricted
Labile
Constricted

## 2021-07-29 NOTE — BH INPATIENT PSYCHIATRY PROGRESS NOTE - NSTXDCFAMPROGRES_PSY_ALL_CORE
Improving
Improving
No Change
Improving
Improving
Worsening
Improving
No Change
Improving
Improving
No Change
Improving
No Change
Improving
Improving
No Change
Met - goal discontinued
Improving

## 2021-07-29 NOTE — BH INPATIENT PSYCHIATRY DISCHARGE NOTE - NSBHDCMEDICALFT_PSY_A_CORE
none Patient was on amlodipine upon admission but he is adamant he does not need amlodipine, despite risks and benefits discussed.

## 2021-07-29 NOTE — BH INPATIENT PSYCHIATRY PROGRESS NOTE - NSTXSUICIDDATEEST_PSY_ALL_CORE
02-Jul-2021
02-Jul-2021
07-Jul-2021
02-Jul-2021
07-Jul-2021
02-Jul-2021
07-Jul-2021
07-Jul-2021
02-Jul-2021
07-Jul-2021
02-Jul-2021

## 2021-07-29 NOTE — BH INPATIENT PSYCHIATRY PROGRESS NOTE - NSBHMSEAFFCONG_PSY_A_CORE
Non-congruent
Non-congruent
Congruent
Non-congruent
Non-congruent
Congruent
Non-congruent
Congruent
Non-congruent
Congruent
Non-congruent
Non-congruent
Congruent
Non-congruent
Congruent
Congruent

## 2021-07-29 NOTE — BH INPATIENT PSYCHIATRY PROGRESS NOTE - NSTXSUICIDDATETRGT_PSY_ALL_CORE
07-Jul-2021
21-Jul-2021
14-Jul-2021
14-Jul-2021
21-Jul-2021
07-Jul-2021
07-Jul-2021
21-Jul-2021
07-Jul-2021
14-Jul-2021
21-Jul-2021
14-Jul-2021
21-Jul-2021
14-Jul-2021
28-Jul-2021
21-Jul-2021

## 2021-07-29 NOTE — BH INPATIENT PSYCHIATRY PROGRESS NOTE - CURRENT MEDICATION
MEDICATIONS  (STANDING):  ARIPiprazole 20 milliGRAM(s) Oral daily  polyethylene glycol 3350 17 Gram(s) Oral once    MEDICATIONS  (PRN):  acetaminophen    Suspension .. 650 milliGRAM(s) Oral every 6 hours PRN Mild Pain (1 - 3), Moderate Pain (4 - 6)  chlorproMAZINE    Injectable 100 milliGRAM(s) IntraMuscular once PRN agitaiton  chlorproMAZINE    Tablet 100 milliGRAM(s) Oral every 6 hours PRN agitation  ibuprofen  Tablet. 400 milliGRAM(s) Oral every 6 hours PRN Temp greater or equal to 38C (100.4F), Moderate Pain (4 - 6)  LORazepam     Tablet 2 milliGRAM(s) Oral every 6 hours PRN agitation  LORazepam     Tablet 2 milliGRAM(s) Oral every 6 hours PRN agitation  LORazepam   Injectable 3 milliGRAM(s) IntraMuscular once PRN agitation  
MEDICATIONS  (STANDING):  polyethylene glycol 3350 17 Gram(s) Oral once    MEDICATIONS  (PRN):  ibuprofen  Tablet. 400 milliGRAM(s) Oral every 6 hours PRN Temp greater or equal to 38C (100.4F), Moderate Pain (4 - 6)  LORazepam     Tablet 2 milliGRAM(s) Oral every 6 hours PRN anxiety  OLANZapine 5 milliGRAM(s) Oral every 6 hours PRN combativeness due to psychosis  OLANZapine Injectable 5 milliGRAM(s) IntraMuscular once PRN combativeness due to psychosis  OLANZapine Injectable 5 milliGRAM(s) IntraMuscular once PRN combativeness due to psychosis  
MEDICATIONS  (STANDING):  amoxicillin  500 milliGRAM(s)/clavulanate 1 Tablet(s) Oral every 12 hours  mupirocin 2% Ointment 1 Application(s) Topical once  paliperidone ER 6 milliGRAM(s) Oral at bedtime  polyethylene glycol 3350 17 Gram(s) Oral once  traZODone 100 milliGRAM(s) Oral at bedtime    MEDICATIONS  (PRN):  acetaminophen    Suspension .. 650 milliGRAM(s) Oral every 6 hours PRN Mild Pain (1 - 3), Moderate Pain (4 - 6)  chlorproMAZINE    Injectable 100 milliGRAM(s) IntraMuscular once PRN agitaiton  chlorproMAZINE    Tablet 100 milliGRAM(s) Oral every 6 hours PRN agitation  ibuprofen  Tablet. 400 milliGRAM(s) Oral every 6 hours PRN Temp greater or equal to 38C (100.4F), Moderate Pain (4 - 6)  LORazepam     Tablet 2 milliGRAM(s) Oral every 6 hours PRN agitation  LORazepam   Injectable 3 milliGRAM(s) IntraMuscular once PRN agitation  
MEDICATIONS  (STANDING):  paliperidone ER 3 milliGRAM(s) Oral at bedtime  polyethylene glycol 3350 17 Gram(s) Oral once  traZODone 50 milliGRAM(s) Oral at bedtime    MEDICATIONS  (PRN):  acetaminophen    Suspension .. 650 milliGRAM(s) Oral every 6 hours PRN Mild Pain (1 - 3), Moderate Pain (4 - 6)  chlorproMAZINE    Injectable 100 milliGRAM(s) IntraMuscular once PRN agitaiton  chlorproMAZINE    Tablet 100 milliGRAM(s) Oral every 6 hours PRN agitation  ibuprofen  Tablet. 400 milliGRAM(s) Oral every 6 hours PRN Temp greater or equal to 38C (100.4F), Moderate Pain (4 - 6)  LORazepam     Tablet 2 milliGRAM(s) Oral every 6 hours PRN agitation  LORazepam   Injectable 3 milliGRAM(s) IntraMuscular once PRN agitation  
MEDICATIONS  (STANDING):  ARIPiprazole 5 milliGRAM(s) Oral at bedtime  paliperidone ER 6 milliGRAM(s) Oral at bedtime  polyethylene glycol 3350 17 Gram(s) Oral once  traZODone 100 milliGRAM(s) Oral at bedtime    MEDICATIONS  (PRN):  acetaminophen    Suspension .. 650 milliGRAM(s) Oral every 6 hours PRN Mild Pain (1 - 3), Moderate Pain (4 - 6)  chlorproMAZINE    Injectable 100 milliGRAM(s) IntraMuscular once PRN agitaiton  chlorproMAZINE    Tablet 100 milliGRAM(s) Oral every 6 hours PRN agitation  ibuprofen  Tablet. 400 milliGRAM(s) Oral every 6 hours PRN Temp greater or equal to 38C (100.4F), Moderate Pain (4 - 6)  LORazepam     Tablet 2 milliGRAM(s) Oral every 6 hours PRN agitation  LORazepam   Injectable 3 milliGRAM(s) IntraMuscular once PRN agitation  
MEDICATIONS  (STANDING):  amoxicillin  500 milliGRAM(s)/clavulanate 1 Tablet(s) Oral every 12 hours  paliperidone ER 9 milliGRAM(s) Oral at bedtime  polyethylene glycol 3350 17 Gram(s) Oral once  traZODone 100 milliGRAM(s) Oral at bedtime    MEDICATIONS  (PRN):  acetaminophen    Suspension .. 650 milliGRAM(s) Oral every 6 hours PRN Mild Pain (1 - 3), Moderate Pain (4 - 6)  chlorproMAZINE    Injectable 100 milliGRAM(s) IntraMuscular once PRN agitaiton  chlorproMAZINE    Tablet 100 milliGRAM(s) Oral every 6 hours PRN agitation  ibuprofen  Tablet. 400 milliGRAM(s) Oral every 6 hours PRN Temp greater or equal to 38C (100.4F), Moderate Pain (4 - 6)  LORazepam     Tablet 2 milliGRAM(s) Oral every 6 hours PRN agitation  LORazepam   Injectable 3 milliGRAM(s) IntraMuscular once PRN agitation  
MEDICATIONS  (STANDING):  ARIPiprazole 10 milliGRAM(s) Oral at bedtime  paliperidone ER 6 milliGRAM(s) Oral at bedtime  polyethylene glycol 3350 17 Gram(s) Oral once  traZODone 50 milliGRAM(s) Oral at bedtime    MEDICATIONS  (PRN):  acetaminophen    Suspension .. 650 milliGRAM(s) Oral every 6 hours PRN Mild Pain (1 - 3), Moderate Pain (4 - 6)  chlorproMAZINE    Injectable 100 milliGRAM(s) IntraMuscular once PRN agitaiton  chlorproMAZINE    Tablet 100 milliGRAM(s) Oral every 6 hours PRN agitation  ibuprofen  Tablet. 400 milliGRAM(s) Oral every 6 hours PRN Temp greater or equal to 38C (100.4F), Moderate Pain (4 - 6)  LORazepam     Tablet 2 milliGRAM(s) Oral every 6 hours PRN agitation  LORazepam   Injectable 3 milliGRAM(s) IntraMuscular once PRN agitation  
MEDICATIONS  (STANDING):  amLODIPine   Tablet 5 milliGRAM(s) Oral daily  ARIPiprazole 15 milliGRAM(s) Oral daily  polyethylene glycol 3350 17 Gram(s) Oral once    MEDICATIONS  (PRN):  chlorproMAZINE    Injectable 100 milliGRAM(s) IntraMuscular once PRN agitaiton  chlorproMAZINE    Tablet 100 milliGRAM(s) Oral every 6 hours PRN agitation  ibuprofen  Tablet. 400 milliGRAM(s) Oral every 6 hours PRN Temp greater or equal to 38C (100.4F), Moderate Pain (4 - 6)  LORazepam     Tablet 2 milliGRAM(s) Oral every 6 hours PRN agitation  LORazepam     Tablet 2 milliGRAM(s) Oral every 6 hours PRN agitation  
MEDICATIONS  (STANDING):  ARIPiprazole 10 milliGRAM(s) Oral at bedtime  paliperidone ER 6 milliGRAM(s) Oral at bedtime  polyethylene glycol 3350 17 Gram(s) Oral once  traZODone 50 milliGRAM(s) Oral at bedtime    MEDICATIONS  (PRN):  acetaminophen    Suspension .. 650 milliGRAM(s) Oral every 6 hours PRN Mild Pain (1 - 3), Moderate Pain (4 - 6)  chlorproMAZINE    Injectable 100 milliGRAM(s) IntraMuscular once PRN agitaiton  chlorproMAZINE    Tablet 100 milliGRAM(s) Oral every 6 hours PRN agitation  ibuprofen  Tablet. 400 milliGRAM(s) Oral every 6 hours PRN Temp greater or equal to 38C (100.4F), Moderate Pain (4 - 6)  LORazepam     Tablet 2 milliGRAM(s) Oral every 6 hours PRN agitation  LORazepam   Injectable 3 milliGRAM(s) IntraMuscular once PRN agitation  
MEDICATIONS  (STANDING):  amoxicillin  500 milliGRAM(s)/clavulanate 1 Tablet(s) Oral every 12 hours  paliperidone ER 6 milliGRAM(s) Oral at bedtime  polyethylene glycol 3350 17 Gram(s) Oral once  traZODone 100 milliGRAM(s) Oral at bedtime    MEDICATIONS  (PRN):  acetaminophen    Suspension .. 650 milliGRAM(s) Oral every 6 hours PRN Mild Pain (1 - 3), Moderate Pain (4 - 6)  chlorproMAZINE    Injectable 100 milliGRAM(s) IntraMuscular once PRN agitaiton  chlorproMAZINE    Tablet 100 milliGRAM(s) Oral every 6 hours PRN agitation  ibuprofen  Tablet. 400 milliGRAM(s) Oral every 6 hours PRN Temp greater or equal to 38C (100.4F), Moderate Pain (4 - 6)  LORazepam     Tablet 2 milliGRAM(s) Oral every 6 hours PRN agitation  LORazepam   Injectable 3 milliGRAM(s) IntraMuscular once PRN agitation  
MEDICATIONS  (STANDING):  ARIPiprazole 5 milliGRAM(s) Oral daily    MEDICATIONS  (PRN):  LORazepam     Tablet 2 milliGRAM(s) Oral every 6 hours PRN anxiety  OLANZapine 5 milliGRAM(s) Oral every 6 hours PRN combativeness due to psychosis  OLANZapine Injectable 5 milliGRAM(s) IntraMuscular once PRN combativeness due to psychosis  OLANZapine Injectable 5 milliGRAM(s) IntraMuscular once PRN combativeness due to psychosis  
MEDICATIONS  (STANDING):  amLODIPine   Tablet 5 milliGRAM(s) Oral daily  polyethylene glycol 3350 17 Gram(s) Oral once    MEDICATIONS  (PRN):  acetaminophen    Suspension .. 650 milliGRAM(s) Oral every 6 hours PRN Mild Pain (1 - 3), Moderate Pain (4 - 6)  chlorproMAZINE    Injectable 100 milliGRAM(s) IntraMuscular once PRN agitaiton  chlorproMAZINE    Tablet 100 milliGRAM(s) Oral every 6 hours PRN agitation  ibuprofen  Tablet. 400 milliGRAM(s) Oral every 6 hours PRN Temp greater or equal to 38C (100.4F), Moderate Pain (4 - 6)  LORazepam     Tablet 2 milliGRAM(s) Oral every 6 hours PRN agitation  LORazepam     Tablet 2 milliGRAM(s) Oral every 6 hours PRN agitation  LORazepam   Injectable 3 milliGRAM(s) IntraMuscular once PRN agitation  
MEDICATIONS  (STANDING):  ARIPiprazole 5 milliGRAM(s) Oral daily    MEDICATIONS  (PRN):  LORazepam     Tablet 2 milliGRAM(s) Oral every 6 hours PRN anxiety  OLANZapine 5 milliGRAM(s) Oral every 6 hours PRN combativeness due to psychosis  OLANZapine Injectable 5 milliGRAM(s) IntraMuscular once PRN combativeness due to psychosis  OLANZapine Injectable 5 milliGRAM(s) IntraMuscular once PRN combativeness due to psychosis  
MEDICATIONS  (STANDING):  amoxicillin  500 milliGRAM(s)/clavulanate 1 Tablet(s) Oral every 12 hours  paliperidone ER 9 milliGRAM(s) Oral at bedtime  polyethylene glycol 3350 17 Gram(s) Oral once  traZODone 100 milliGRAM(s) Oral at bedtime    MEDICATIONS  (PRN):  acetaminophen    Suspension .. 650 milliGRAM(s) Oral every 6 hours PRN Mild Pain (1 - 3), Moderate Pain (4 - 6)  chlorproMAZINE    Injectable 100 milliGRAM(s) IntraMuscular once PRN agitaiton  chlorproMAZINE    Tablet 100 milliGRAM(s) Oral every 6 hours PRN agitation  ibuprofen  Tablet. 400 milliGRAM(s) Oral every 6 hours PRN Temp greater or equal to 38C (100.4F), Moderate Pain (4 - 6)  LORazepam     Tablet 2 milliGRAM(s) Oral every 6 hours PRN agitation  LORazepam   Injectable 2 milliGRAM(s) IntraMuscular once PRN agitation  
MEDICATIONS  (STANDING):  amoxicillin  500 milliGRAM(s)/clavulanate 1 Tablet(s) Oral every 12 hours  paliperidone ER 9 milliGRAM(s) Oral at bedtime  polyethylene glycol 3350 17 Gram(s) Oral once  traZODone 100 milliGRAM(s) Oral at bedtime    MEDICATIONS  (PRN):  acetaminophen    Suspension .. 650 milliGRAM(s) Oral every 6 hours PRN Mild Pain (1 - 3), Moderate Pain (4 - 6)  chlorproMAZINE    Injectable 100 milliGRAM(s) IntraMuscular once PRN agitaiton  chlorproMAZINE    Tablet 100 milliGRAM(s) Oral every 6 hours PRN agitation  ibuprofen  Tablet. 400 milliGRAM(s) Oral every 6 hours PRN Temp greater or equal to 38C (100.4F), Moderate Pain (4 - 6)  LORazepam     Tablet 2 milliGRAM(s) Oral every 6 hours PRN agitation  LORazepam   Injectable 2 milliGRAM(s) IntraMuscular once PRN agitation  
MEDICATIONS  (STANDING):  ARIPiprazole 10 milliGRAM(s) Oral daily  polyethylene glycol 3350 17 Gram(s) Oral once    MEDICATIONS  (PRN):  ibuprofen  Tablet. 400 milliGRAM(s) Oral every 6 hours PRN Temp greater or equal to 38C (100.4F), Moderate Pain (4 - 6)  OLANZapine 5 milliGRAM(s) Oral every 6 hours PRN combativeness due to psychosis  OLANZapine Injectable 5 milliGRAM(s) IntraMuscular once PRN combativeness due to psychosis  
MEDICATIONS  (STANDING):  ARIPiprazole 20 milliGRAM(s) Oral daily  polyethylene glycol 3350 17 Gram(s) Oral once    MEDICATIONS  (PRN):  acetaminophen    Suspension .. 650 milliGRAM(s) Oral every 6 hours PRN Mild Pain (1 - 3), Moderate Pain (4 - 6)  chlorproMAZINE    Injectable 100 milliGRAM(s) IntraMuscular once PRN agitaiton  chlorproMAZINE    Tablet 100 milliGRAM(s) Oral every 6 hours PRN agitation  ibuprofen  Tablet. 400 milliGRAM(s) Oral every 6 hours PRN Temp greater or equal to 38C (100.4F), Moderate Pain (4 - 6)  LORazepam     Tablet 2 milliGRAM(s) Oral every 6 hours PRN agitation  LORazepam   Injectable 3 milliGRAM(s) IntraMuscular once PRN agitation  melatonin. 5 milliGRAM(s) Oral at bedtime PRN Insomnia  
MEDICATIONS  (STANDING):  ARIPiprazole 5 milliGRAM(s) Oral at bedtime  paliperidone ER 6 milliGRAM(s) Oral at bedtime  polyethylene glycol 3350 17 Gram(s) Oral once  traZODone 100 milliGRAM(s) Oral at bedtime    MEDICATIONS  (PRN):  acetaminophen    Suspension .. 650 milliGRAM(s) Oral every 6 hours PRN Mild Pain (1 - 3), Moderate Pain (4 - 6)  chlorproMAZINE    Injectable 100 milliGRAM(s) IntraMuscular once PRN agitaiton  chlorproMAZINE    Tablet 100 milliGRAM(s) Oral every 6 hours PRN agitation  ibuprofen  Tablet. 400 milliGRAM(s) Oral every 6 hours PRN Temp greater or equal to 38C (100.4F), Moderate Pain (4 - 6)  LORazepam     Tablet 2 milliGRAM(s) Oral every 6 hours PRN agitation  LORazepam   Injectable 3 milliGRAM(s) IntraMuscular once PRN agitation  
MEDICATIONS  (STANDING):  polyethylene glycol 3350 17 Gram(s) Oral once    MEDICATIONS  (PRN):  ibuprofen  Tablet. 400 milliGRAM(s) Oral every 6 hours PRN Temp greater or equal to 38C (100.4F), Moderate Pain (4 - 6)  OLANZapine 5 milliGRAM(s) Oral every 6 hours PRN combativeness due to psychosis  OLANZapine Injectable 5 milliGRAM(s) IntraMuscular once PRN combativeness due to psychosis  
MEDICATIONS  (STANDING):  ARIPiprazole 20 milliGRAM(s) Oral daily  polyethylene glycol 3350 17 Gram(s) Oral once    MEDICATIONS  (PRN):  acetaminophen    Suspension .. 650 milliGRAM(s) Oral every 6 hours PRN Mild Pain (1 - 3), Moderate Pain (4 - 6)  chlorproMAZINE    Injectable 100 milliGRAM(s) IntraMuscular once PRN agitaiton  chlorproMAZINE    Tablet 100 milliGRAM(s) Oral every 6 hours PRN agitation  ibuprofen  Tablet. 400 milliGRAM(s) Oral every 6 hours PRN Temp greater or equal to 38C (100.4F), Moderate Pain (4 - 6)  LORazepam     Tablet 2 milliGRAM(s) Oral every 6 hours PRN agitation  LORazepam   Injectable 3 milliGRAM(s) IntraMuscular once PRN agitation  melatonin. 5 milliGRAM(s) Oral at bedtime PRN Insomnia

## 2021-07-29 NOTE — BH INPATIENT PSYCHIATRY PROGRESS NOTE - NSTXDCFAMDATEEST_PSY_ALL_CORE
20-Jul-2021
13-Jul-2021
06-Jul-2021
20-Jul-2021
06-Jul-2021
06-Jul-2021
13-Jul-2021
20-Jul-2021
20-Jul-2021
06-Jul-2021
20-Jul-2021
13-Jul-2021
13-Jul-2021
20-Jul-2021
06-Jul-2021
13-Jul-2021

## 2021-07-29 NOTE — BH INPATIENT PSYCHIATRY PROGRESS NOTE - NSBHMSEAFFQUAL_PSY_A_CORE
Anxious
Irritable/Anxious
Irritable
Irritable
Depressed/Irritable
Anxious
Irritable
Depressed/Irritable
Euthymic/Irritable
Irritable

## 2021-07-29 NOTE — BH INPATIENT PSYCHIATRY PROGRESS NOTE - NSTXPROBDISORG_PSY_ALL_CORE
DISORGANIZATION OF THOUGHT/BEHAVIOR
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
## 2021-07-29 NOTE — BH DISCHARGE NOTE NURSING/SOCIAL WORK/PSYCH REHAB - NSDCPRGOAL_PSY_ALL_CORE
Patient was irritable and uncooperative upon writer’s attempt to engage him in final progress assessment. Therefore, the following information is based upon chart records and writer-patient interactions on the unit. Patient has attended approximately _% of daily psychiatric rehabilitation groups with inconsistent engagement/participation and he was often unable to tolerate duration of group discussion/activity despite encouragement from facilitator. Patient has demonstrated medication compliance during current hospitalization, and he denied side effects during most recent progress assessment with writer. Group and goal progress. Patient was able to endorse benefit of medication compliance and identify effective coping methods during hospitalization as well as skills that he would like to improve in the interest of improved behavioral control/self-discipline. Patient sought writer out several times for education/support on the same and he was receptive during the ensuing sessions. Patient attended to ADL adequately and appearance/hygiene has improved appreciably. Patient’s sleep patterns and appetite are reportedly within normal limits and active participation in his psychiatric treatment has increased significantly. Patient’s insight/judgement regarding symptom/stress management needs requires extensive improvement.  Patient was irritable and uncooperative upon writer’s attempt to engage him in final progress assessment. Therefore, the following information is based upon chart records and writer-patient interactions on the unit. Patient has attended approximately 20% of daily psychiatric rehabilitation groups with inconsistent engagement/participation and he was often unable to tolerate duration of group discussion/activity despite encouragement from facilitator. Patient has demonstrated medication compliance during current hospitalization, and he denied side effects during most recent progress assessment with writer. Group and goal progress. Patient was able to endorse benefit of medication compliance and identify effective coping methods during hospitalization as well as skills that he would like to improve in the interest of improved behavioral control/self-discipline. Patient sought writer out several times for education/support on the same and he was receptive during the ensuing sessions. Patient attended to ADL adequately and appearance/hygiene has improved appreciably. Patient’s sleep patterns and appetite are reportedly within normal limits and active participation in his psychiatric treatment has increased significantly. Patient’s insight/judgement regarding symptom/stress management needs requires extensive improvement.  Patient was irritable and uncooperative upon writer’s attempt to engage him in final progress assessment. Therefore, the following information is based upon chart records and writer-patient interactions on the unit. Patient has attended approximately 20% of daily psychiatric rehabilitation groups with inconsistent engagement/participation and he was often unable to tolerate duration of group discussion/activity despite encouragement from facilitator. Patient has demonstrated medication compliance during current hospitalization, and he denied side effects during most recent progress assessment with writer. Patient was able to endorse benefit of medication compliance and identify effective coping methods during hospitalization as well as skills that he would like to improve in the interest of improved behavioral control/self-discipline. Patient sought writer out several times for education/support on the same and he was receptive during the ensuing sessions. Patient attended to ADL adequately and appearance/hygiene has improved appreciably. Patient’s sleep patterns and appetite are reportedly within normal limits and active participation in his psychiatric treatment has increased significantly. Patient’s insight/judgement regarding symptom/stress management needs requires extensive improvement.

## 2021-07-29 NOTE — BH INPATIENT PSYCHIATRY DISCHARGE NOTE - HPI (INCLUDE ILLNESS QUALITY, SEVERITY, DURATION, TIMING, CONTEXT, MODIFYING FACTORS, ASSOCIATED SIGNS AND SYMPTOMS)
Patient was seen and evaluated, chart reviewed. Case discussed with nursing team.  On service for this 20 year old male with PPH of Schizophrenia Disorder. Patient is hospitalized with a primary problem of  psychotic decompensation, disorganization and aggression at home. Patient admitted to [facility] on a 9.39 legal status. I have reviewed the initial psychiatric assessment in the electronic medical record, including the history of present illness, past psychiatric history, family/social history (no pertinent changes), and exam, and have confirmed the salient findings dated 7/2/21.  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 Alonzo and Dr Manzano (therapist), no hx suicide attempts, no self-injurious behavior, currently prescribed Abilify 15 mg daily and Cogentin 0.5mg; substance history significant for current cannabis (THC) use - has been using on a daily basis for at least the past  couple of months; no history of violence/legal problems/arrest was BIB EMS activated by pt's mother for disorganization/aggression and intentional overdose on ~10 15mg pills of Abilify and an  unknown amount of cogentin   Patient interviewed after period of observation in the ED 2/2 overdose. On interview, patient is calm, somewhat drowsy but able to carry on a conversation. States the reason he was brought in is because "I need to get away from my parents". States he got into an argument with his parents because they are trying to hold him back and prevent him from "going places in life", (which is inconsistent with collateral from mother, who states pt was found screaming to himself in his room). Pt states he decided to take the pills when EMS arrived because "I didn't want to do this again". Patient confirms intent to harm himself when he took the pills. When asked if he wanted to die, he states "I wanted to leave." Admits to contemplating ending his life numerous times over the past few weeks, but refuses to disclose other methods of how he would go about doing so, does state, however "I've thought of many ways." Denies access to gun in the home. When asked about paranoia, pt states he did feel paranoid in the past (2019) but denies current paranoia, though when asked about his family's medical history, pt states "They have some dark stuff going on." When asked if he thinks they want to hurt him, he appears confused and states "I don't know, do they?" Patient endorses AH which have returned in the past few weeks. In the past, he has had [facility] to "do bad things", but states the voices, though recently returned, have been positive in nature and "helpful." Denies VH. Patient admits to ongoing passive SI, states he is ambivalent about dying.    On unit:  Information Received From: Chart review and patient interview  Patient is followed up for Schizophrenia, admitted for psychotic decompensation, disorganization and aggression at home.  Chart, medications and labs reviewed.  Patient is discussed with nursing staff. No significant overnight issues.  Patient is observed in quite room, irritable upon approach, hesitant to engage in interview.  When questioned about precipitating events leading to current admission and why he is here pt replies “it’s all documented I don’t want to answer questions I don’t want to be evaluated anymore.”  Interview was limited/ challenging due to patient’s disorganization, paranoia and unwillingness to participate.    Per nursing no behavioral concerns, no prn for aggression. Compliant with standing medications, and he is eating, sleeping well. Per nursing patient has been minimizing symptoms, and was annoyed about having to take medications. Admission labs reviewed, no acute findings. A1C/lipid/u-tox ordered.

## 2021-07-29 NOTE — BH INPATIENT PSYCHIATRY PROGRESS NOTE - NSTXDCFAMDATETRGT_PSY_ALL_CORE
20-Jul-2021
27-Jul-2021
20-Jul-2021
13-Jul-2021
27-Jul-2021
13-Jul-2021
27-Jul-2021
27-Jul-2021
13-Jul-2021
22-Jul-2021
27-Jul-2021
13-Jul-2021
20-Jul-2021
22-Jul-2021
13-Jul-2021
20-Jul-2021
22-Jul-2021
20-Jul-2021

## 2021-07-29 NOTE — BH INPATIENT PSYCHIATRY DISCHARGE NOTE - NSBHMETABOLIC_PSY_ALL_CORE_FT
BMI: BMI (kg/m2): 25.5 (07-17-21 @ 06:42)  HbA1c: A1C with Estimated Average Glucose Result: 5.2 % (07-04-21 @ 09:35)    Glucose:   BP: 144/85 (07-29-21 @ 06:48) (126/71 - 144/85)  Lipid Panel: Date/Time: 07-04-21 @ 09:35  Cholesterol, Serum: 169  Direct LDL: --  HDL Cholesterol, Serum: 35  Total Cholesterol/HDL Ration Measurement: --  Triglycerides, Serum: 90

## 2021-07-29 NOTE — BH INPATIENT PSYCHIATRY PROGRESS NOTE - NSTXSUICIDPROGRES_PSY_ALL_CORE
Improving
Met - goal discontinued
Improving
No Change
Improving
Met - goal discontinued
Improving
No Change
Improving

## 2021-07-29 NOTE — BH INPATIENT PSYCHIATRY PROGRESS NOTE - NSTXIMPULSGOAL_PSY_ALL_CORE
Will be able to describe/demonstrate alternative ways of managing his/her emotional state on the unit
Will be able to demonstrate the ability to pause before acting out negatively
Will be able to describe/demonstrate alternative ways of managing his/her emotional state on the unit

## 2021-07-29 NOTE — BH INPATIENT PSYCHIATRY PROGRESS NOTE - NSTXIMPULSDATETRGT_PSY_ALL_CORE
19-Jul-2021
04-Aug-2021
19-Jul-2021

## 2021-07-29 NOTE — BH INPATIENT PSYCHIATRY DISCHARGE NOTE - HOSPITAL COURSE
On admission interview, patient presented with paranoia, thought blocking and latent speech. She believed that her  doesn't love her anymore and wants to hurt her and that her phone was being watched. Patient was hesitant to medication changes but was cooperative.    Patient was started back on Zyprexa 5mg and titrated dose to 7.5mg with good effect.  Patient also started on Lexapro 10mg and it was titrated to 20mg QD. During titration, patient had no reported/observed adverse effects, such as akathisia, tremor, EPS.      Patient's symptoms gradually improved over the course of the hospitalization.  There was notable improvement in depressed mood, feelings of worthlessness and tearfulness with decrease in paranoia. Patient expressed concerns of returning back to working from home and taking care of her two children but is also hopeful her  will be more helpful and understanding.  There were no behavioral problems on the unit.  Patient did not become agitated and did not require emergent intramuscular medications. Patient was mostly isolative to self, minimal interaction with peers on the unit. She never endorsed any SI/HI.    On day of discharge, the patient has improved significantly and no longer requires inpatient treatment and care. Patient denies all suicidal and aggressive ideation, intent and plan. Patient is not judged to be an acute danger to self or others at this time.    Patient did not have any medical problems during this hospitalization.  There were no medical consultations.      Patient will be discharged with the following DSM5 Diagnoses: Schizophrenia, paranoid    Patient will be discharged on the following medications:  Zyprexa 7.5mg QHS  Lexapro 20mg QD      Patient was educated about side effects of Zyprexa including EPS, TD, and metabolic syndrome.  Side effects of Lexapro including serotonin syndrome, risk for increased suicidality and withdrawal sx if abrupt discontinuation. Patient’s family agreed with discharge plan and felt that it was safe for patient to return home.   Patient and family were provided with emergency phone numbers and were instructed to call 911 or go to the nearest ER for worsening of symptoms, dangerousness to self/others.       The patient has a low acute risk and low chronic risk of self-harm. Protective factors include no SI, no hx of SA, no SIB, no substance abuse, no current mood sx, no hopelessness, no access to firearms.  Chronic risk factors include chronic course of presenting psychotic illness and hx of non-compliance. Immediate risk was minimized by inpatient admission to a safe environment with appropriate supervision and limited access to lethal means. Future risk was minimized before discharge by treatment of acute psychotic symptoms, maximizing outpatient support, providing relevant patient education, discussing emergency procedures, and ensuring close follow-up. The patient remains at a low acute risk of self-harm, and such risk cannot be further ameliorated by continued inpatient treatment and the patient is therefore appropriate for discharge.       On admission interview, patient presented with disorganization, +AH, aggression and alleged SA by OD.  Patient denied it was a suicide attempt and stated that he took the pills impulsively when the police came to his home because he did not want to return to the hospital. Patient was restarted on the Abilify and dose was titrated to 20mg. Patient showed minimal improvement. He had a few incidences of agitation towards staff and peers and required emergent IM Medications. He  began to report that the Abilify causes him to have memory loss and "bad thoughts."  When asked to be specific on "bad thoughts," pt remained guarded, unwilling to elaborate, then reports "thoughts from my past." Discussed with patient switching to Invega as Abilify is causing distressing thoughts for patient and patient eventually agrees after reading education sheet on Invega.  Patient was tapered off Abilify and Invega titrated to 9mg.  During titration, patient had no reported/observed adverse effects, such as akathisia, tremor, EPS.   Patient initially endorsed SI with the Invega but then also reported that he has had SI since he was [age] yrs old. Patient then reported having increased heart rate and restlessness with the Invega but EKG was normal and patient does not appear restless. Patient wanted medication to be changed to only Thorazine at night for sleep.  Medication education provided and patient verbalized importance of continued medication compliance though he has very little insight into his illness. Patient was offered Invega Sustenna, but he refused.   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 He stated that he needs to speak with his father but would not elaborate with writer. Patient stated that he will discuss this with his outpatient therapist and rehearse the conversation with the therapist before speaking to his father.    On day of discharge, the patient has improved significantly and no longer requires inpatient treatment and care. Patient denies all suicidal and aggressive ideation, intent and plan and AVH. He remains guarded and paranoid but no recent behavioral issues on the unit. He  is able to walk away from provoking peers and participate in groups.  Patient is not judged to be an acute danger to self or others at this time.  Patient did not have any medical problems during this hospitalization.  There were no medical consultations.    Patient was educated about side effects of Invega including EPS, TD, and metabolic syndrome.  Patient agreed with discharge plan and felt that it was safe for patient to return home.   Patient and family were provided with emergency phone numbers and were instructed to call 911 or go to the nearest ER for worsening of symptoms, dangerousness to self/others.     The patient has a low acute risk and low chronic risk of self-harm. Protective factors include no SI, no hx of SA, no SIB, no current mood sx, no hopelessness, no access to firearms.  Chronic risk factors include chronic course of presenting psychotic illness and hx of non-compliance. Immediate risk was minimized by inpatient admission to a safe environment with appropriate supervision and limited access to lethal means. Future risk was minimized before discharge by treatment of acute psychotic symptoms, maximizing outpatient support, providing relevant patient education, discussing emergency procedures, and ensuring close follow-up. The patient remains at a low acute risk of self-harm, and such risk cannot be further ameliorated by continued inpatient treatment and the patient is therefore appropriate for discharge.           On admission interview, patient presented with disorganization, +AH, aggression and alleged SA by OD.  Patient denied it was a suicide attempt and stated that he took the pills impulsively when the police came to his home because he did not want to return to the hospital. Patient was restarted on the Abilify and dose was titrated to 20mg. Patient showed minimal improvement. He had a few incidences of agitation towards staff and peers and required emergent IM Medications. He  began to report that the Abilify causes him to have memory loss and "bad thoughts."  When asked to be specific on "bad thoughts," pt remained guarded, unwilling to elaborate, then reports "thoughts from my past." Discussed with patient switching to Invega as Abilify is causing distressing thoughts for patient and patient eventually agrees after reading education sheet on Invega.  Patient was tapered off Abilify and Invega titrated to 9mg.  During titration, patient had no reported/observed adverse effects, such as akathisia, tremor, EPS.   Patient initially endorsed SI with the Invega but then also reported that he has had SI since he was [age] yrs old. Patient then reported having increased heart rate and restlessness with the Invega but EKG was normal and patient does not appear restless. Patient wanted medication to be changed to only Thorazine at night for sleep.  Medication education provided and patient verbalized importance of continued medication compliance though he has very little insight into his illness. Patient was offered Invega Sustenna, but he refused.   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 He stated that he needs to speak with his father but would not elaborate with writer. Patient stated that he will discuss this with his outpatient therapist and rehearse the conversation with the therapist before speaking to his father.  Education provided on importance of abstaining from substance use and it's effect on worsening his mood and psychotic sx.  On day of discharge, the patient has improved significantly and no longer requires inpatient treatment and care. Patient denies all suicidal and aggressive ideation, intent and plan and AVH. He remains guarded and paranoid but no recent behavioral issues on the unit. He  is able to walk away from provoking peers and participate in groups.  Patient is not judged to be an acute danger to self or others at this time.  Patient did not have any medical problems during this hospitalization.  There were no medical consultations.    Patient was educated about side effects of Invega including EPS, TD, and metabolic syndrome.  Patient agreed with discharge plan and felt that it was safe for patient to return home.   Patient and family were provided with emergency phone numbers and were instructed to call 911 or go to the nearest ER for worsening of symptoms, dangerousness to self/others.     The patient has a low acute risk and low chronic risk of self-harm. Protective factors include no SI, no hx of SA, no SIB, no current mood sx, no hopelessness, no access to firearms.  Chronic risk factors include chronic course of presenting psychotic illness and hx of non-compliance. Immediate risk was minimized by inpatient admission to a safe environment with appropriate supervision and limited access to lethal means. Future risk was minimized before discharge by treatment of acute psychotic symptoms, maximizing outpatient support, providing relevant patient education, discussing emergency procedures, and ensuring close follow-up. The patient remains at a low acute risk of self-harm, and such risk cannot be further ameliorated by continued inpatient treatment and the patient is therefore appropriate for discharge.

## 2021-07-29 NOTE — BH INPATIENT PSYCHIATRY PROGRESS NOTE - NSTXSUBMISDATEEST_PSY_ALL_CORE
02-Jul-2021
02-Jul-2021
07-Jul-2021
02-Jul-2021
07-Jul-2021
02-Jul-2021
28-Jul-2021
02-Jul-2021
07-Jul-2021
07-Jul-2021
02-Jul-2021
02-Jul-2021
07-Jul-2021
02-Jul-2021

## 2021-07-29 NOTE — BH INPATIENT PSYCHIATRY DISCHARGE NOTE - OTHER PAST PSYCHIATRIC HISTORY (INCLUDE DETAILS REGARDING ONSET, COURSE OF ILLNESS, INPATIENT/OUTPATIENT TREATMENT)
PT has diagnosis of schizophrenia prior inpatient admission at [facility] and at [facility].

## 2021-07-29 NOTE — BH INPATIENT PSYCHIATRY PROGRESS NOTE - NSBHCONSBHPROVDETAILS_PSY_A_CORE  FT
Contacted Dr. Alonzo at [facility]
Contacted Dr. Alonzo at [facility]
Contacted Dr. Alonzo at [facility]
Contacted Dr. Alonzo at [facility]
Contacted Dr. Alonzo at [facility]
Contacted Dr. Alonzo at [facility]
Contacted Dr. Alonzo at [facility]
Contacted Dr. Alonzo at [facility]
Contacted Dr. Alonzo at [facility]
Contacted Dr. Alonzo at [facility]
Contacted Dr[facility] Alonzo at [facility]
Contacted Dr. Alonzo at [facility]
Contacted [facility] Alonzo at [facility]
Contacted [facility] Alonzo at [facility]
Contacted Dr. Alonzo at [facility]
Contacted Dr. Alonzo at [facility]
Contacted Dr. Alonzo at [facility]
Contacted Dr. Alonzo at [facility]

## 2021-07-29 NOTE — BH INPATIENT PSYCHIATRY PROGRESS NOTE - NSBHMSEBEHAV_PSY_A_CORE
Cooperative
Cooperative
Other
Other
Cooperative
Cooperative
Other
Other
Cooperative/Other
Other
Cooperative
Other
Cooperative/Other
Other
Cooperative/Other
Cooperative/Other
Cooperative
Cooperative

## 2021-07-29 NOTE — BH INPATIENT PSYCHIATRY PROGRESS NOTE - NSTXDISORGPROGRES_PSY_ALL_CORE
No Change
Improving
No Change
No Change
Improving
No Change
Improving
Worsening
Worsening
Improving
Improving
Worsening
Improving

## 2021-07-29 NOTE — BH INPATIENT PSYCHIATRY PROGRESS NOTE - NSBHFUPINTERVALCCFT_PSY_A_CORE
Pt seen f/u for psychosis and mood lability
Pt seen f/u for psychosis and mood lability
Patient seen for follow up for psychosis.
Psychosis/Mood lability
Pt seen f/u for psychosis and mood lability
Patient seen for follow up for psychosis.
Patient seen for follow up for psychosis.
Pt seen f/u for psychosis and mood lability
Psychosis/Mood lability
Pt seen f/u for psychosis and mood lability
Pt seen f/u for psychosis and mood lability
Patient seen for follow up for psychosis.
Pt seen f/u for psychosis and mood lability

## 2021-07-29 NOTE — BH INPATIENT PSYCHIATRY PROGRESS NOTE - NSBHINPTBILLING_PSY_ALL_CORE
66149 - Inpatient Moderate Complexity
30444 - Inpatient Moderate Complexity
55935 - Inpatient Moderate Complexity
51915 - Inpatient Moderate Complexity
23545 - Inpatient Moderate Complexity
44886 - Inpatient Moderate Complexity
86817 - Inpatient Moderate Complexity
65524 - Inpatient Moderate Complexity
78084 - Inpatient Moderate Complexity
19283 - Inpatient Moderate Complexity
77865 - Inpatient Moderate Complexity
75866 - Inpatient Moderate Complexity
17175 - Inpatient Moderate Complexity
38458 - Inpatient Moderate Complexity
92216 - Inpatient Moderate Complexity
64032 - Inpatient Moderate Complexity
09412 - Inpatient Moderate Complexity
98942 - Hospital Discharge Day Management; 30 min or less
82042 - Inpatient Moderate Complexity
20229 - Inpatient Moderate Complexity

## 2021-07-29 NOTE — BH INPATIENT PSYCHIATRY PROGRESS NOTE - NSBHATTESTSEENBY_PSY_A_CORE
NP without Attending Psychiatrist
NP with telephonic supervision from Attending Psychiatrist
NP without Attending Psychiatrist

## 2021-07-29 NOTE — BH INPATIENT PSYCHIATRY PROGRESS NOTE - NSTXMEDICDATETRGT_PSY_ALL_CORE
04-Aug-2021
14-Jul-2021
21-Jul-2021
14-Jul-2021
09-Jul-2021
21-Jul-2021
09-Jul-2021
09-Jul-2021
21-Jul-2021
09-Jul-2021
14-Jul-2021
21-Jul-2021
14-Jul-2021
21-Jul-2021
14-Jul-2021
21-Jul-2021

## 2021-07-29 NOTE — ED BEHAVIORAL HEALTH ASSESSMENT NOTE - THOUGHT ASSOCIATIONS
"  Problem: Psychotic Symptoms  Goal: Psychotic Symptoms  Description: Signs and symptoms of listed problems will be absent or manageable.  Outcome: No Change  Flowsheets (Taken 7/29/2021 1040)  Psychotic Symptoms Assessed: all  Psychotic Symptoms Present:    thought process    speech    insight    Patient attended group and is observed in the milieu a majority of the shift.    Ate breakfast and is compliant with medications.  Patient is oriented to self.  Denies SI, SIB, HI, and hallucinations.  Patient is incontinent this am.  Bed linen is soiled with urine and small amount of stool.  Patient is encouraged to shower.  Patient refused.  Patient has two briefs in his room.  Pt. Is encouraged to use a brief at bedtime to prevent skin breakdown.  Patient response is \"I am okay and I don't need anything.\"  Patient bed linen is changed.   Patient has an order for covid swab.  Patient refused.  Continue with plan of care.     " assisted to bedpan Normal

## 2021-07-29 NOTE — BH INPATIENT PSYCHIATRY PROGRESS NOTE - NSBHMSEPERCEPT_PSY_A_CORE
Other
No abnormalities
Other
No abnormalities
Other
No abnormalities
Other
No abnormalities
Other
No abnormalities

## 2021-07-29 NOTE — BH INPATIENT PSYCHIATRY PROGRESS NOTE - MSE OPTIONS
Structured MSE

## 2021-07-29 NOTE — BH INPATIENT PSYCHIATRY PROGRESS NOTE - NSCGISEVERILLNESS_PSY_ALL_CORE
4 = Moderately ill – overt symptoms causing noticeable, but modest, functional impairment or distress; symptom level may warrant medication
3 = Mildly ill – clearly established symptoms with minimal, if any, distress or difficulty in social and occupational function
4 = Moderately ill – overt symptoms causing noticeable, but modest, functional impairment or distress; symptom level may warrant medication

## 2021-07-29 NOTE — BH INPATIENT PSYCHIATRY PROGRESS NOTE - NSTXMEDICGOAL_PSY_ALL_CORE
Take all medications as prescribed
Be able to describe the benefit of medication/treatment
Take all medications as prescribed
Be able to state dosages and times to take medications
Be able to state dosages and times to take medications
Take all medications as prescribed
Be able to state dosages and times to take medications
Take all medications as prescribed
Be able to describe the benefit of medication/treatment
Be able to state dosages and times to take medications
Take all medications as prescribed
Be able to describe the benefit of medication/treatment
Take all medications as prescribed
Take all medications as prescribed
Be able to describe the benefit of medication/treatment
Be able to state dosages and times to take medications
Take all medications as prescribed

## 2021-07-29 NOTE — BH INPATIENT PSYCHIATRY PROGRESS NOTE - NSTXSUBMISDATETRGT_PSY_ALL_CORE
21-Jul-2021
14-Jul-2021
21-Jul-2021
21-Jul-2021
14-Jul-2021
21-Jul-2021
14-Jul-2021
21-Jul-2021
28-Jul-2021
21-Jul-2021

## 2021-07-29 NOTE — BH INPATIENT PSYCHIATRY PROGRESS NOTE - GENERAL APPEARANCE
No deformities present

## 2021-07-29 NOTE — BH INPATIENT PSYCHIATRY PROGRESS NOTE - NSBHMETABOLIC_PSY_ALL_CORE_FT
BMI: BMI (kg/m2): 25.4 (07-02-21 @ 22:45)  HbA1c: A1C with Estimated Average Glucose Result: 5.2 % (07-04-21 @ 09:35)    Glucose:   BP: 142/100 (07-09-21 @ 06:28) (125/90 - 142/100)  Lipid Panel: Date/Time: 07-04-21 @ 09:35  Cholesterol, Serum: 169  Direct LDL: --  HDL Cholesterol, Serum: 35  Total Cholesterol/HDL Ration Measurement: --  Triglycerides, Serum: 90  
BMI: BMI (kg/m2): 25.4 (07-02-21 @ 22:45)  HbA1c: A1C with Estimated Average Glucose Result: 5.2 % (07-04-21 @ 09:35)    Glucose:   BP: 110/74 (07-16-21 @ 08:04) (110/74 - 110/74)  Lipid Panel: Date/Time: 07-04-21 @ 09:35  Cholesterol, Serum: 169  Direct LDL: --  HDL Cholesterol, Serum: 35  Total Cholesterol/HDL Ration Measurement: --  Triglycerides, Serum: 90  
BMI: BMI (kg/m2): 25.4 (07-02-21 @ 22:45)  HbA1c: A1C with Estimated Average Glucose Result: 5.2 % (07-04-21 @ 09:35)    Glucose:   BP: 125/90 (07-07-21 @ 06:09) (125/90 - 125/90)  Lipid Panel: Date/Time: 07-04-21 @ 09:35  Cholesterol, Serum: 169  Direct LDL: --  HDL Cholesterol, Serum: 35  Total Cholesterol/HDL Ration Measurement: --  Triglycerides, Serum: 90  
BMI: BMI (kg/m2): 25.5 (07-17-21 @ 06:42)  HbA1c: A1C with Estimated Average Glucose Result: 5.2 % (07-04-21 @ 09:35)    Glucose:   BP: 136/89 (07-20-21 @ 06:12) (136/89 - 136/89)  Lipid Panel: Date/Time: 07-04-21 @ 09:35  Cholesterol, Serum: 169  Direct LDL: --  HDL Cholesterol, Serum: 35  Total Cholesterol/HDL Ration Measurement: --  Triglycerides, Serum: 90  
BMI: BMI (kg/m2): 25.4 (07-02-21 @ 22:45)  HbA1c: A1C with Estimated Average Glucose Result: 5.2 % (07-04-21 @ 09:35)    Glucose:   BP: 125/90 (07-07-21 @ 06:09) (125/90 - 125/90)  Lipid Panel: Date/Time: 07-04-21 @ 09:35  Cholesterol, Serum: 169  Direct LDL: --  HDL Cholesterol, Serum: 35  Total Cholesterol/HDL Ration Measurement: --  Triglycerides, Serum: 90  
BMI: BMI (kg/m2): 25.4 (07-02-21 @ 22:45)  HbA1c: A1C with Estimated Average Glucose Result: 5.2 % (07-04-21 @ 09:35)    Glucose:   BP: 154/96 (07-04-21 @ 07:59) (128/69 - 160/105)  Lipid Panel: Date/Time: 07-04-21 @ 09:35  Cholesterol, Serum: 169  Direct LDL: --  HDL Cholesterol, Serum: 35  Total Cholesterol/HDL Ration Measurement: --  Triglycerides, Serum: 90  
BMI: BMI (kg/m2): 25.4 (07-02-21 @ 22:45)  HbA1c: A1C with Estimated Average Glucose Result: 5.2 % (07-04-21 @ 09:35)    Glucose:   BP: 142/92 (07-13-21 @ 06:31) (138/94 - 142/92)  Lipid Panel: Date/Time: 07-04-21 @ 09:35  Cholesterol, Serum: 169  Direct LDL: --  HDL Cholesterol, Serum: 35  Total Cholesterol/HDL Ration Measurement: --  Triglycerides, Serum: 90  
BMI: BMI (kg/m2): 25.4 (07-02-21 @ 22:45)  HbA1c: A1C with Estimated Average Glucose Result: 5.2 % (07-04-21 @ 09:35)    Glucose:   BP: 142/92 (07-13-21 @ 06:31) (142/92 - 142/92)  Lipid Panel: Date/Time: 07-04-21 @ 09:35  Cholesterol, Serum: 169  Direct LDL: --  HDL Cholesterol, Serum: 35  Total Cholesterol/HDL Ration Measurement: --  Triglycerides, Serum: 90  
BMI: BMI (kg/m2): 25.5 (07-17-21 @ 06:42)  HbA1c: A1C with Estimated Average Glucose Result: 5.2 % (07-04-21 @ 09:35)    Glucose:   BP: 136/89 (07-20-21 @ 06:12) (136/89 - 136/89)  Lipid Panel: Date/Time: 07-04-21 @ 09:35  Cholesterol, Serum: 169  Direct LDL: --  HDL Cholesterol, Serum: 35  Total Cholesterol/HDL Ration Measurement: --  Triglycerides, Serum: 90  
BMI: BMI (kg/m2): 25.5 (07-17-21 @ 06:42)  HbA1c: A1C with Estimated Average Glucose Result: 5.2 % (07-04-21 @ 09:35)    Glucose:   BP: 136/89 (07-20-21 @ 06:12) (136/89 - 136/89)  Lipid Panel: Date/Time: 07-04-21 @ 09:35  Cholesterol, Serum: 169  Direct LDL: --  HDL Cholesterol, Serum: 35  Total Cholesterol/HDL Ration Measurement: --  Triglycerides, Serum: 90  
BMI: BMI (kg/m2): 25.4 (07-02-21 @ 22:45)  HbA1c: A1C with Estimated Average Glucose Result: 5.2 % (07-04-21 @ 09:35)    Glucose:   BP: 154/96 (07-04-21 @ 07:59) (128/69 - 160/105)  Lipid Panel: Date/Time: 07-04-21 @ 09:35  Cholesterol, Serum: 169  Direct LDL: --  HDL Cholesterol, Serum: 35  Total Cholesterol/HDL Ration Measurement: --  Triglycerides, Serum: 90  
BMI: BMI (kg/m2): 25.5 (07-17-21 @ 06:42)  HbA1c: A1C with Estimated Average Glucose Result: 5.2 % (07-04-21 @ 09:35)    Glucose:   BP: 134/76 (07-17-21 @ 06:42) (134/76 - 134/76)  Lipid Panel: Date/Time: 07-04-21 @ 09:35  Cholesterol, Serum: 169  Direct LDL: --  HDL Cholesterol, Serum: 35  Total Cholesterol/HDL Ration Measurement: --  Triglycerides, Serum: 90  
BMI: BMI (kg/m2): 25.4 (07-02-21 @ 22:45)  HbA1c: A1C with Estimated Average Glucose Result: 5.2 % (07-04-21 @ 09:35)    Glucose:   BP: 154/96 (07-04-21 @ 07:59) (154/96 - 154/96)  Lipid Panel: Date/Time: 07-04-21 @ 09:35  Cholesterol, Serum: 169  Direct LDL: --  HDL Cholesterol, Serum: 35  Total Cholesterol/HDL Ration Measurement: --  Triglycerides, Serum: 90  
BMI: BMI (kg/m2): 25.5 (07-17-21 @ 06:42)  HbA1c: A1C with Estimated Average Glucose Result: 5.2 % (07-04-21 @ 09:35)    Glucose:   BP: 132/98 (07-28-21 @ 06:44) (126/71 - 132/98)  Lipid Panel: Date/Time: 07-04-21 @ 09:35  Cholesterol, Serum: 169  Direct LDL: --  HDL Cholesterol, Serum: 35  Total Cholesterol/HDL Ration Measurement: --  Triglycerides, Serum: 90  
BMI: BMI (kg/m2): 25.5 (07-17-21 @ 06:42)  HbA1c: A1C with Estimated Average Glucose Result: 5.2 % (07-04-21 @ 09:35)    Glucose:   BP: --  Lipid Panel: Date/Time: 07-04-21 @ 09:35  Cholesterol, Serum: 169  Direct LDL: --  HDL Cholesterol, Serum: 35  Total Cholesterol/HDL Ration Measurement: --  Triglycerides, Serum: 90  
BMI: BMI (kg/m2): 25.4 (07-02-21 @ 22:45)  HbA1c: A1C with Estimated Average Glucose Result: 5.2 % (07-04-21 @ 09:35)    Glucose:   BP: 138/94 (07-12-21 @ 06:00) (138/94 - 138/94)  Lipid Panel: Date/Time: 07-04-21 @ 09:35  Cholesterol, Serum: 169  Direct LDL: --  HDL Cholesterol, Serum: 35  Total Cholesterol/HDL Ration Measurement: --  Triglycerides, Serum: 90  
BMI: BMI (kg/m2): 25.5 (07-17-21 @ 06:42)  HbA1c: A1C with Estimated Average Glucose Result: 5.2 % (07-04-21 @ 09:35)    Glucose:   BP: 126/80 (07-26-21 @ 06:32) (111/65 - 145/83)  Lipid Panel: Date/Time: 07-04-21 @ 09:35  Cholesterol, Serum: 169  Direct LDL: --  HDL Cholesterol, Serum: 35  Total Cholesterol/HDL Ration Measurement: --  Triglycerides, Serum: 90  
BMI: BMI (kg/m2): 25.4 (07-02-21 @ 22:45)  HbA1c: A1C with Estimated Average Glucose Result: 5.2 % (07-04-21 @ 09:35)    Glucose:   BP: 142/92 (07-13-21 @ 06:31) (138/94 - 142/92)  Lipid Panel: Date/Time: 07-04-21 @ 09:35  Cholesterol, Serum: 169  Direct LDL: --  HDL Cholesterol, Serum: 35  Total Cholesterol/HDL Ration Measurement: --  Triglycerides, Serum: 90  
BMI: BMI (kg/m2): 25.5 (07-17-21 @ 06:42)  HbA1c: A1C with Estimated Average Glucose Result: 5.2 % (07-04-21 @ 09:35)    Glucose:   BP: 126/71 (07-27-21 @ 07:01) (111/65 - 126/80)  Lipid Panel: Date/Time: 07-04-21 @ 09:35  Cholesterol, Serum: 169  Direct LDL: --  HDL Cholesterol, Serum: 35  Total Cholesterol/HDL Ration Measurement: --  Triglycerides, Serum: 90  
BMI: BMI (kg/m2): 25.5 (07-17-21 @ 06:42)  HbA1c: A1C with Estimated Average Glucose Result: 5.2 % (07-04-21 @ 09:35)    Glucose:   BP: 144/85 (07-29-21 @ 06:48) (126/71 - 144/85)  Lipid Panel: Date/Time: 07-04-21 @ 09:35  Cholesterol, Serum: 169  Direct LDL: --  HDL Cholesterol, Serum: 35  Total Cholesterol/HDL Ration Measurement: --  Triglycerides, Serum: 90

## 2021-07-29 NOTE — BH INPATIENT PSYCHIATRY PROGRESS NOTE - NSBHMSETHTCONTENT_PSY_A_CORE
Delusions
Unremarkable
Delusions/Other
Delusions
Delusions/Other
Delusions
Delusions/Other
Other
Other
Unremarkable
Delusions
Delusions
Delusions/Other
Delusions
Unremarkable/Other
Delusions/Other

## 2021-07-29 NOTE — BH DISCHARGE NOTE NURSING/SOCIAL WORK/PSYCH REHAB - NSDCPRRECOMMEND_PSY_ALL_CORE
Writer recommends that patient demonstrate medication/treatment compliance, post discharge to better manage symptoms/stressors and to avoid readmission. Upon discharge, patient would benefit from outpatient psychiatric treatment for ongoing medication management, psychotherapy, and support. Patient would also benefit from engagement in at least one structured, purpose-driven activity, daily.

## 2021-08-06 DIAGNOSIS — Z71.89 OTHER SPECIFIED COUNSELING: ICD-10-CM

## 2021-08-06 PROCEDURE — 90832 PSYTX W PT 30 MINUTES: CPT | Mod: 95

## 2021-08-14 ENCOUNTER — INPATIENT (INPATIENT)
Facility: HOSPITAL | Age: 21
LOS: 19 days | Discharge: HOME | End: 2021-09-03
Attending: STUDENT IN AN ORGANIZED HEALTH CARE EDUCATION/TRAINING PROGRAM | Admitting: PSYCHIATRY & NEUROLOGY
Payer: MEDICAID

## 2021-08-14 VITALS
OXYGEN SATURATION: 100 % | HEART RATE: 109 BPM | TEMPERATURE: 99 F | RESPIRATION RATE: 17 BRPM | SYSTOLIC BLOOD PRESSURE: 115 MMHG | DIASTOLIC BLOOD PRESSURE: 97 MMHG | HEIGHT: 72 IN

## 2021-08-14 DIAGNOSIS — F20.9 SCHIZOPHRENIA, UNSPECIFIED: ICD-10-CM

## 2021-08-14 LAB
ALBUMIN SERPL ELPH-MCNC: 5.7 G/DL — HIGH (ref 3.3–5)
ALP SERPL-CCNC: 70 U/L — SIGNIFICANT CHANGE UP (ref 40–120)
ALT FLD-CCNC: 36 U/L — SIGNIFICANT CHANGE UP (ref 4–41)
ANION GAP SERPL CALC-SCNC: 18 MMOL/L — HIGH (ref 7–14)
AST SERPL-CCNC: 28 U/L — SIGNIFICANT CHANGE UP (ref 4–40)
BASOPHILS # BLD AUTO: 0.02 K/UL — SIGNIFICANT CHANGE UP (ref 0–0.2)
BASOPHILS NFR BLD AUTO: 0.2 % — SIGNIFICANT CHANGE UP (ref 0–2)
BILIRUB SERPL-MCNC: 0.8 MG/DL — SIGNIFICANT CHANGE UP (ref 0.2–1.2)
BUN SERPL-MCNC: 11 MG/DL — SIGNIFICANT CHANGE UP (ref 7–23)
CALCIUM SERPL-MCNC: 10.6 MG/DL — HIGH (ref 8.4–10.5)
CHLORIDE SERPL-SCNC: 98 MMOL/L — SIGNIFICANT CHANGE UP (ref 98–107)
CO2 SERPL-SCNC: 22 MMOL/L — SIGNIFICANT CHANGE UP (ref 22–31)
COVID-19 SPIKE DOMAIN AB INTERP: POSITIVE
COVID-19 SPIKE DOMAIN ANTIBODY RESULT: >250 U/ML — HIGH
CREAT SERPL-MCNC: 1.13 MG/DL — SIGNIFICANT CHANGE UP (ref 0.5–1.3)
EOSINOPHIL # BLD AUTO: 0.04 K/UL — SIGNIFICANT CHANGE UP (ref 0–0.5)
EOSINOPHIL NFR BLD AUTO: 0.4 % — SIGNIFICANT CHANGE UP (ref 0–6)
GLUCOSE SERPL-MCNC: 103 MG/DL — HIGH (ref 70–99)
HCT VFR BLD CALC: 54.9 % — HIGH (ref 39–50)
HGB BLD-MCNC: 18.6 G/DL — HIGH (ref 13–17)
IANC: 6.8 K/UL — SIGNIFICANT CHANGE UP (ref 1.5–8.5)
IMM GRANULOCYTES NFR BLD AUTO: 0.3 % — SIGNIFICANT CHANGE UP (ref 0–1.5)
LYMPHOCYTES # BLD AUTO: 1.56 K/UL — SIGNIFICANT CHANGE UP (ref 1–3.3)
LYMPHOCYTES # BLD AUTO: 16.9 % — SIGNIFICANT CHANGE UP (ref 13–44)
MCHC RBC-ENTMCNC: 27.8 PG — SIGNIFICANT CHANGE UP (ref 27–34)
MCHC RBC-ENTMCNC: 33.9 GM/DL — SIGNIFICANT CHANGE UP (ref 32–36)
MCV RBC AUTO: 82.2 FL — SIGNIFICANT CHANGE UP (ref 80–100)
MONOCYTES # BLD AUTO: 0.76 K/UL — SIGNIFICANT CHANGE UP (ref 0–0.9)
MONOCYTES NFR BLD AUTO: 8.3 % — SIGNIFICANT CHANGE UP (ref 2–14)
NEUTROPHILS # BLD AUTO: 6.8 K/UL — SIGNIFICANT CHANGE UP (ref 1.8–7.4)
NEUTROPHILS NFR BLD AUTO: 73.9 % — SIGNIFICANT CHANGE UP (ref 43–77)
NRBC # BLD: 0 /100 WBCS — SIGNIFICANT CHANGE UP
NRBC # FLD: 0 K/UL — SIGNIFICANT CHANGE UP
PLATELET # BLD AUTO: 209 K/UL — SIGNIFICANT CHANGE UP (ref 150–400)
POTASSIUM SERPL-MCNC: 3.7 MMOL/L — SIGNIFICANT CHANGE UP (ref 3.5–5.3)
POTASSIUM SERPL-SCNC: 3.7 MMOL/L — SIGNIFICANT CHANGE UP (ref 3.5–5.3)
PROT SERPL-MCNC: 8.7 G/DL — HIGH (ref 6–8.3)
RBC # BLD: 6.68 M/UL — HIGH (ref 4.2–5.8)
RBC # FLD: 12.4 % — SIGNIFICANT CHANGE UP (ref 10.3–14.5)
SARS-COV-2 IGG+IGM SERPL QL IA: >250 U/ML — HIGH
SARS-COV-2 IGG+IGM SERPL QL IA: POSITIVE
SARS-COV-2 RNA SPEC QL NAA+PROBE: SIGNIFICANT CHANGE UP
SODIUM SERPL-SCNC: 138 MMOL/L — SIGNIFICANT CHANGE UP (ref 135–145)
TOXICOLOGY SCREEN, DRUGS OF ABUSE, SERUM RESULT: SIGNIFICANT CHANGE UP
TSH SERPL-MCNC: 3.72 UIU/ML — SIGNIFICANT CHANGE UP (ref 0.27–4.2)
WBC # BLD: 9.21 K/UL — SIGNIFICANT CHANGE UP (ref 3.8–10.5)
WBC # FLD AUTO: 9.21 K/UL — SIGNIFICANT CHANGE UP (ref 3.8–10.5)

## 2021-08-14 PROCEDURE — 99285 EMERGENCY DEPT VISIT HI MDM: CPT

## 2021-08-14 PROCEDURE — 99285 EMERGENCY DEPT VISIT HI MDM: CPT | Mod: 25

## 2021-08-14 PROCEDURE — 93010 ELECTROCARDIOGRAM REPORT: CPT

## 2021-08-14 RX ORDER — DIPHENHYDRAMINE HCL 50 MG
50 CAPSULE ORAL EVERY 6 HOURS
Refills: 0 | Status: DISCONTINUED | OUTPATIENT
Start: 2021-08-14 | End: 2021-09-03

## 2021-08-14 RX ORDER — DIPHENHYDRAMINE HCL 50 MG
50 CAPSULE ORAL ONCE
Refills: 0 | Status: DISCONTINUED | OUTPATIENT
Start: 2021-08-14 | End: 2021-09-03

## 2021-08-14 RX ORDER — HALOPERIDOL DECANOATE 100 MG/ML
5 INJECTION INTRAMUSCULAR ONCE
Refills: 0 | Status: COMPLETED | OUTPATIENT
Start: 2021-08-14 | End: 2021-08-14

## 2021-08-14 RX ORDER — CLONAZEPAM 1 MG
0.5 TABLET ORAL
Refills: 0 | Status: DISCONTINUED | OUTPATIENT
Start: 2021-08-15 | End: 2021-08-18

## 2021-08-14 RX ORDER — HALOPERIDOL DECANOATE 100 MG/ML
5 INJECTION INTRAMUSCULAR ONCE
Refills: 0 | Status: DISCONTINUED | OUTPATIENT
Start: 2021-08-14 | End: 2021-09-03

## 2021-08-14 RX ORDER — HALOPERIDOL DECANOATE 100 MG/ML
5 INJECTION INTRAMUSCULAR EVERY 6 HOURS
Refills: 0 | Status: DISCONTINUED | OUTPATIENT
Start: 2021-08-14 | End: 2021-09-03

## 2021-08-14 RX ADMIN — HALOPERIDOL DECANOATE 5 MILLIGRAM(S): 100 INJECTION INTRAMUSCULAR at 13:39

## 2021-08-14 RX ADMIN — Medication 2 MILLIGRAM(S): at 13:39

## 2021-08-14 NOTE — ED BEHAVIORAL HEALTH ASSESSMENT NOTE - ADDITIONAL DETAILS ALL
has h/o suicidal ideation without intent or plan per chart, denies current has h/o suicidal ideation without intent or plan per chart
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
## 2021-08-14 NOTE — ED BEHAVIORAL HEALTH ASSESSMENT NOTE - RISK ASSESSMENT
Risk factors include single, male, unemployed, acute psychosis, med noncompliant, anxiety, hopelessness substance abuse, current suicidal ideation in the context of intentional overdose prior to presentation. Protective factors include stability of domicile, supportive family. Given the above, patient is felt to be at imminent risk of suicide and unsafe for discharge at this time High Acute Suicide Risk Risk factors include single, male, unemployed, acute psychosis, med noncompliant, anxiety, hopelessness possible substance abuse, recent overdose, recent hospitalization. Protective factors include stability of domicile, supportive family. Given the above, patient is felt to be at high imminent risk of harm to others and given suicide history he is at elevated risk of harm to self (though unable to assess for suicidality at this time). He requires inpatient hospitalization. Moderate Acute Suicide Risk
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
## 2021-08-14 NOTE — ED BEHAVIORAL HEALTH ASSESSMENT NOTE - DIFFERENTIAL
Schizophrenia   Schizoaffective disorder Schizophrenia   Schizoaffective disorder  r/out substance induced psychosis though low suspicion
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
## 2021-08-14 NOTE — ED BEHAVIORAL HEALTH NOTE - BEHAVIORAL HEALTH NOTE
COVID Exposure Screen- Collateral (i.e. third-party, chart review, belongings, etc; include EMS and ED staff)    Per mother: Patient has not been out of state or country in the past 14 days  nor has he had contact in the past 14 days with anyone with COVID. His COVID PCR test in the ED was negative. He received J&J COVID vaccine in 4/2021.

## 2021-08-14 NOTE — ED BEHAVIORAL HEALTH ASSESSMENT NOTE - PSYCHIATRIC ISSUES AND PLAN (INCLUDE STANDING AND PRN MEDICATION)
will continue abilify at lower dose (5mg) and hold cogentin for now. Zyprexa 5mg po/im for agitation and Ativan 2mg po for anxiety Will restart Invega 3mg po qHS, and increase to 6mg tomorrow. Patient would benefit from BOWENS during this admission if agreeable. PRNS: Haldol/Ativan/Benadryl po/IM for agitation/severe agitation in context of psychosis. Will restart Invega 3mg po qHS, and increase to 6mg tomorrow, Klonopin 0.5mg po BID given agitation in ED and previous admission, RNS: Haldol/Ativan/Benadryl po/IM for agitation/severe agitation in context of psychosis.
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
## 2021-08-14 NOTE — ED BEHAVIORAL HEALTH ASSESSMENT NOTE - NSCURPASTPSYDX_PSY_ALL_CORE
current SI/Psychotic disorder/Alcohol/Substance Use disorders Psychotic disorder/Alcohol/Substance Use disorders

## 2021-08-14 NOTE — ED PROVIDER NOTE - CLINICAL SUMMARY MEDICAL DECISION MAKING FREE TEXT BOX
Labs, Urine Tox/ EKG,    Medical evaluation performed. There is no clinical evidence of intoxication or any acute medical problem requiring immediate intervention. Patient is awaiting psychiatric consultation. Final disposition will be determined by psychiatrist. 19 y/o M hx Schizophrenia, Liver Hemangiomas    Labs, Urine Tox/ EKG,    Medical evaluation performed. There is no clinical evidence of intoxication or any acute medical problem requiring immediate intervention. Patient is awaiting psychiatric consultation. Final disposition will be determined by psychiatrist.  Admit to inpatient psychiatry.
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
## 2021-08-14 NOTE — ED ADULT NURSE NOTE - NSSUHOSCREENINGYN_ED_ALL_ED
"Subjective:       Patient ID: Herve Zuleta is a 23 y.o. male.    Chief Complaint: Abscess (x2)    Abscess   Chronicity:  New (States two "bumps on right side" )Progression Since Onset: waxing and waning  Location:  Torso  Associated Symptoms: no fever, no chills, no sweats  Characteristics: painful and redness    Pain Scale:  2/10  Treatments Tried:  Draining/squeezing  Relieved by:  Draining/squeezing  Worsened by:  Nothing    Review of Systems   Constitutional: Negative.  Negative for chills and fever.   HENT: Negative.    Respiratory: Negative.    Gastrointestinal: Negative.    Genitourinary: Negative.    Skin:        "bumps on side"    Allergic/Immunologic: Negative.    Hematological: Negative.    All other systems reviewed and are negative.      Objective:      Physical Exam   Constitutional: He is oriented to person, place, and time. He appears well-developed and well-nourished. No distress.   HENT:   Head: Normocephalic and atraumatic.   Pulmonary/Chest: Effort normal. No respiratory distress.   Abdominal: Soft. He exhibits no distension.   Neurological: He is alert and oriented to person, place, and time.   Skin: Skin is warm, dry and intact. Capillary refill takes less than 2 seconds. He is not diaphoretic. No pallor.        There are two erythematous annual lesions on lateral right lower abdomen at waist line.  Lesions are < 1/2 cm in size.  Medial lateral lesion is mildly indurated, erythematous and painful to touch.  There is no drainage. Mild warmth. Center has pinpoint crust.     Lateral lesion appears to be healing abscess.  No induration no drainage.  Center of the lesion has pinpoint size crust.  No warmth. No obvious fluctuance.     Medial later lesion opened with 25 gauge needle and drained.  See procedure note.    Psychiatric: He has a normal mood and affect. His behavior is normal.   Nursing note and vitals reviewed.      Assessment:       1. Cutaneous abscess of abdominal wall        Plan:    "    Cutaneous abscess of abdominal wall  -     mupirocin (BACTROBAN) 2 % ointment; Apply topically 2 (two) times daily.  Dispense: 22 g; Refill: 0  -     sulfamethoxazole-trimethoprim 800-160mg (BACTRIM DS) 800-160 mg Tab; Take 1 tablet by mouth 2 (two) times daily. for 7 days  Dispense: 14 tablet; Refill: 0  -     CULTURE, AEROBIC  (SPECIFY SOURCE)  -     Incision & Drainage         Medication List           Accurate as of 9/28/18  1:42 PM. If you have any questions, ask your nurse or doctor.               START taking these medications    mupirocin 2 % ointment  Commonly known as:  BACTROBAN  Apply topically 2 (two) times daily.  Started by:  Mary Luke DNP, NP-C     sulfamethoxazole-trimethoprim 800-160mg 800-160 mg Tab  Commonly known as:  BACTRIM DS  Take 1 tablet by mouth 2 (two) times daily. for 7 days  Started by:  Mary Luke DNP, NPElizaebthC        CONTINUE taking these medications    loratadine 10 mg tablet  Commonly known as:  CLARITIN     multivitamin with minerals tablet           Where to Get Your Medications      These medications were sent to [facility] [id number] [facility], [place] - [address] [address]    Phone:  [phone]   · mupirocin 2 % ointment  · sulfamethoxazole-trimethoprim 800-160mg 800-160 mg Tab             I have reviewed the patient's past medical/surgical and social histories and updated as appropriate. Medications were reviewed and discussed as appropriate including side effects and risks versus benefit. Plan of care was reviewed and agreed upon with the patient.  An opportunity to ask questions was provided and explanation given. Patient verbalized understanding on all information reviewed and discussed.  The patient will follow up with PCP or sooner if needed. If symptoms worsen patient may call for ASAP appointment or report to the emergency department for further evaluation.  Pt. Instructed he may come by before he leaves to go home to  [place] on Monday for wound check.       No - the patient is unable to be screened due to medical condition

## 2021-08-14 NOTE — ED BEHAVIORAL HEALTH ASSESSMENT NOTE - VIOLENCE RISK FACTORS:
Feeling of being under threat and being unable to control threat/Lack of insight into violence risk/need for treatment/Noncompliance with treatment Violent ideation/threat/speech/Affective dysregulation/Lack of insight into violence risk/need for treatment/Noncompliance with treatment
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
## 2021-08-14 NOTE — ED BEHAVIORAL HEALTH ASSESSMENT NOTE - OTHER PAST PSYCHIATRIC HISTORY (INCLUDE DETAILS REGARDING ONSET, COURSE OF ILLNESS, INPATIENT/OUTPATIENT TREATMENT)
diagnosis of schizophrenia  2 prior inpatient admission on [facility], 1 at [facility] diagnosis of schizophrenia, 3 prior inpatient admissions at [facility], 1 at [facility]

## 2021-08-14 NOTE — ED BEHAVIORAL HEALTH ASSESSMENT NOTE - NSPRESENTSXS_PSY_ALL_CORE
Depressed mood/Anhedonia/Psychosis/Hopelessness or despair Severe anxiety, agitation or panic/Refusal or inability to complete safety plan
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
## 2021-08-14 NOTE — ED BEHAVIORAL HEALTH ASSESSMENT NOTE - CURRENT MEDICATION
Abilify 15mg po QD, cogentin 0.5 mg po bid  (pt reports noncompliance discharged Invega up to 9 mg daily and trazodone 100 mg QHS

## 2021-08-15 DIAGNOSIS — F12.10 CANNABIS ABUSE, UNCOMPLICATED: ICD-10-CM

## 2021-08-15 PROCEDURE — 99222 1ST HOSP IP/OBS MODERATE 55: CPT

## 2021-08-15 RX ORDER — DIVALPROEX SODIUM 500 MG/1
500 TABLET, DELAYED RELEASE ORAL AT BEDTIME
Refills: 0 | Status: DISCONTINUED | OUTPATIENT
Start: 2021-08-15 | End: 2021-08-17

## 2021-08-15 RX ORDER — HALOPERIDOL DECANOATE 100 MG/ML
5 INJECTION INTRAMUSCULAR ONCE
Refills: 0 | Status: COMPLETED | OUTPATIENT
Start: 2021-08-15 | End: 2021-08-15

## 2021-08-15 RX ORDER — PALIPERIDONE 1.5 MG/1
3 TABLET, EXTENDED RELEASE ORAL AT BEDTIME
Refills: 0 | Status: DISCONTINUED | OUTPATIENT
Start: 2021-08-15 | End: 2021-08-23

## 2021-08-15 RX ORDER — RISPERIDONE 4 MG/1
1 TABLET ORAL AT BEDTIME
Refills: 0 | Status: DISCONTINUED | OUTPATIENT
Start: 2021-08-15 | End: 2021-08-15

## 2021-08-15 RX ORDER — DIPHENHYDRAMINE HCL 50 MG
50 CAPSULE ORAL ONCE
Refills: 0 | Status: COMPLETED | OUTPATIENT
Start: 2021-08-15 | End: 2021-08-15

## 2021-08-15 RX ADMIN — PALIPERIDONE 3 MILLIGRAM(S): 1.5 TABLET, EXTENDED RELEASE ORAL at 20:58

## 2021-08-15 RX ADMIN — HALOPERIDOL DECANOATE 5 MILLIGRAM(S): 100 INJECTION INTRAMUSCULAR at 20:11

## 2021-08-15 RX ADMIN — Medication 50 MILLIGRAM(S): at 09:13

## 2021-08-15 RX ADMIN — DIVALPROEX SODIUM 500 MILLIGRAM(S): 500 TABLET, DELAYED RELEASE ORAL at 20:09

## 2021-08-15 RX ADMIN — HALOPERIDOL DECANOATE 5 MILLIGRAM(S): 100 INJECTION INTRAMUSCULAR at 09:13

## 2021-08-15 RX ADMIN — Medication 2 MILLIGRAM(S): at 09:13

## 2021-08-15 RX ADMIN — Medication 50 MILLIGRAM(S): at 20:12

## 2021-08-15 RX ADMIN — Medication 0.5 MILLIGRAM(S): at 20:09
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
## 2021-08-15 NOTE — BH INPATIENT PSYCHIATRY ASSESSMENT NOTE - NSBHCRANIAL_PSY_ALL_CORE
Opens mouth, sticks out tongue (V, XII)/Normal speech (IX, X, XII)/Shoulder shrug (XI)/Hearing intact (VIII)/Other...

## 2021-08-15 NOTE — BH INPATIENT PSYCHIATRY ASSESSMENT NOTE - NSBHMETABOLIC_PSY_ALL_CORE_FT
BMI: BMI (kg/m2): 25.5 (08-14-21 @ 22:28)  HbA1c: A1C with Estimated Average Glucose Result: 5.2 % (07-04-21 @ 09:35)    Glucose:   BP: 101/68 (08-14-21 @ 20:50) (101/68 - 146/94)  Lipid Panel: Date/Time: 07-04-21 @ 09:35  Cholesterol, Serum: 169  Direct LDL: --  HDL Cholesterol, Serum: 35  Total Cholesterol/HDL Ration Measurement: --  Triglycerides, Serum: 90

## 2021-08-15 NOTE — BH INPATIENT PSYCHIATRY ASSESSMENT NOTE - CURRENT MEDICATION
MEDICATIONS  (STANDING):  clonazePAM  Tablet 0.5 milliGRAM(s) Oral two times a day  paliperidone ER 3 milliGRAM(s) Oral at bedtime    MEDICATIONS  (PRN):  diphenhydrAMINE 50 milliGRAM(s) Oral every 6 hours PRN agitation  diphenhydrAMINE   Injectable 50 milliGRAM(s) IntraMuscular once PRN agitation  haloperidol     Tablet 5 milliGRAM(s) Oral every 6 hours PRN agitation  haloperidol    Injectable 5 milliGRAM(s) IntraMuscular once PRN Agitation  LORazepam     Tablet 2 milliGRAM(s) Oral every 6 hours PRN Agitation  LORazepam   Injectable 2 milliGRAM(s) IntraMuscular Once PRN Agitation
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
## 2021-08-15 NOTE — BH INPATIENT PSYCHIATRY ASSESSMENT NOTE - NSBHCHARTREVIEWVS_PSY_A_CORE FT
Vital Signs Last 24 Hrs  T(C): 36.3 (15 Aug 2021 08:16), Max: 36.7 (14 Aug 2021 22:28)  T(F): 97.4 (15 Aug 2021 08:16), Max: 98 (14 Aug 2021 22:28)  HR: 97 (14 Aug 2021 20:50) (55 - 97)  BP: 101/68 (14 Aug 2021 20:50) (101/68 - 114/55)  BP(mean): --  RR: 18 (14 Aug 2021 20:50) (16 - 18)  SpO2: 100% (14 Aug 2021 20:50) (100% - 100%)

## 2021-08-15 NOTE — BH INPATIENT PSYCHIATRY ASSESSMENT NOTE - OTHER PAST PSYCHIATRIC HISTORY (INCLUDE DETAILS REGARDING ONSET, COURSE OF ILLNESS, INPATIENT/OUTPATIENT TREATMENT)
diagnosis of schizophrenia, 3 prior inpatient admissions at [facility], 1 at [facility]

## 2021-08-15 NOTE — BH INPATIENT PSYCHIATRY ASSESSMENT NOTE - DESCRIPTION
dropped out of [employer], lives at home with family, unemployed

## 2021-08-15 NOTE — BH INPATIENT PSYCHIATRY ASSESSMENT NOTE - VIOLENCE RISK FACTORS:
Violent ideation/threat/speech/Affective dysregulation/Impulsivity/Lack of insight into violence risk/need for treatment/Noncompliance with treatment

## 2021-08-15 NOTE — BH PATIENT PROFILE - HOME MEDICATIONS
paliperidone 9 mg oral tablet, extended release , 1 tab(s) orally once a day (at bedtime)  traZODone 100 mg oral tablet , 1 tab(s) orally once a day (at bedtime)  amoxicillin-clavulanate 500 mg-125 mg oral tablet , 1 tab(s) orally every 12 hours

## 2021-08-16 PROCEDURE — 99232 SBSQ HOSP IP/OBS MODERATE 35: CPT

## 2021-08-16 RX ADMIN — Medication 0.5 MILLIGRAM(S): at 20:24

## 2021-08-16 RX ADMIN — Medication 50 MILLIGRAM(S): at 22:24

## 2021-08-16 RX ADMIN — DIVALPROEX SODIUM 500 MILLIGRAM(S): 500 TABLET, DELAYED RELEASE ORAL at 20:24

## 2021-08-16 RX ADMIN — Medication 0.5 MILLIGRAM(S): at 08:13

## 2021-08-16 RX ADMIN — PALIPERIDONE 3 MILLIGRAM(S): 1.5 TABLET, EXTENDED RELEASE ORAL at 20:24
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
## 2021-08-17 PROCEDURE — 99232 SBSQ HOSP IP/OBS MODERATE 35: CPT

## 2021-08-17 RX ORDER — TRAZODONE HCL 50 MG
50 TABLET ORAL AT BEDTIME
Refills: 0 | Status: DISCONTINUED | OUTPATIENT
Start: 2021-08-17 | End: 2021-09-03

## 2021-08-17 RX ADMIN — Medication 0.5 MILLIGRAM(S): at 21:27

## 2021-08-17 RX ADMIN — Medication 0.5 MILLIGRAM(S): at 08:43

## 2021-08-17 RX ADMIN — PALIPERIDONE 3 MILLIGRAM(S): 1.5 TABLET, EXTENDED RELEASE ORAL at 21:27

## 2021-08-17 NOTE — BH INPATIENT PSYCHIATRY PROGRESS NOTE - NSBHFUPINTERVALHXFT_PSY_A_CORE
Chart reviewed, including vital signs, medication administration record, lab results, and nursing notes.   Case discussed with nursing, psychology, psych rehab, and social work in team meeting.   - Some mild HTN noted    Patient is seen in the treatment room, in no acute distress, amenable to interview. We discussed his current medication, and he denies any medication side effects. However, he adamantly declines long acting injectable medication. States that he often forgets to take medication at home, and states that he needs to “set an alarm“ for his medication. He requests a prescription for Thorazine, and we discussed the risks of worse EPS symptoms, including tardive dyskinesia. Denies symptoms of psychosis including paranoia, delusions, and hallucinations. Reports stable sleep and appetite. Denies medication side effects. We discussed a plan to discontinue VPA today and start trazodone as needed for sleep. 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 Denies SI/HI/AVH.    Consents to allow us to speak with his mother.
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
## 2021-08-18 PROCEDURE — 99232 SBSQ HOSP IP/OBS MODERATE 35: CPT

## 2021-08-18 RX ORDER — CLONAZEPAM 1 MG
0.5 TABLET ORAL AT BEDTIME
Refills: 0 | Status: DISCONTINUED | OUTPATIENT
Start: 2021-08-18 | End: 2021-08-19

## 2021-08-18 RX ADMIN — Medication 0.5 MILLIGRAM(S): at 09:20

## 2021-08-18 RX ADMIN — Medication 0.5 MILLIGRAM(S): at 20:45

## 2021-08-18 RX ADMIN — PALIPERIDONE 3 MILLIGRAM(S): 1.5 TABLET, EXTENDED RELEASE ORAL at 20:45

## 2021-08-18 NOTE — BH INPATIENT PSYCHIATRY PROGRESS NOTE - NSBHFUPINTERVALHXFT_PSY_A_CORE
Chart reviewed, including vital signs, medication administration record, lab results, and nursing notes.   Case discussed with nursing, psychology, psych rehab, and social work in team meeting.     Patient is seen in the treatment room, in no acute distress, amenable to interview. States that paliperidone is causing him to have akathisia. States that he has been pacing during group. Notes that he has spoken to his mother over the phone, but he hopes that she does not visit the hospital, because he is afraid of being “triggered“ and causing a scene. States that anytime they talk about his father or brother, he may get triggered. States that his father is “very manipulative“ and his brother is “has a bunch of mental health issues of his own; he is aggressive against kids.“ Denies SI/HI/AVH at this time. Reports stable sleep and appetite. We discussed the plan to decrease the dose of clonazepam and start propranolol to address akathisia.

## 2021-08-19 PROCEDURE — 99232 SBSQ HOSP IP/OBS MODERATE 35: CPT

## 2021-08-19 RX ADMIN — PALIPERIDONE 3 MILLIGRAM(S): 1.5 TABLET, EXTENDED RELEASE ORAL at 20:24

## 2021-08-19 NOTE — BH INPATIENT PSYCHIATRY PROGRESS NOTE - NSBHFUPINTERVALHXFT_PSY_A_CORE
Chart reviewed, including vital signs, medication administration record, lab results, and nursing notes.   Case discussed with nursing, psychology, psych rehab, and social work in team meeting.     Patient is seen in the outside area, in no acute distress, amenable to interview. States that his mother visited yesterday and feels that the visit went well. States that he began to take propranolol, but has not noticed a significant difference at this time. Denies any side effects with Invega, and agrees with plan to discontinue clonazepam today. States that Invega is effective, but it causes his thoughts to be “too clear.“ States that he would struggle to make a “snap decision.“ We discussed the indications for this medication. He states that he wants to have a different psychiatrist after discharge because “his relationship isn’t good with his current psychiatrist.“ Denies SI/HI/AVH. Reports stable sleep and appetite.

## 2021-08-20 PROCEDURE — 99231 SBSQ HOSP IP/OBS SF/LOW 25: CPT

## 2021-08-20 RX ADMIN — PALIPERIDONE 3 MILLIGRAM(S): 1.5 TABLET, EXTENDED RELEASE ORAL at 20:36
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
## 2021-08-21 RX ORDER — ACETAMINOPHEN 500 MG
650 TABLET ORAL EVERY 6 HOURS
Refills: 0 | Status: DISCONTINUED | OUTPATIENT
Start: 2021-08-21 | End: 2021-09-03

## 2021-08-21 RX ADMIN — Medication 650 MILLIGRAM(S): at 12:34

## 2021-08-21 RX ADMIN — PALIPERIDONE 3 MILLIGRAM(S): 1.5 TABLET, EXTENDED RELEASE ORAL at 20:28
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
## 2021-08-22 RX ADMIN — PALIPERIDONE 3 MILLIGRAM(S): 1.5 TABLET, EXTENDED RELEASE ORAL at 21:43

## 2021-08-22 RX ADMIN — Medication 50 MILLIGRAM(S): at 01:58

## 2021-08-23 PROCEDURE — 99232 SBSQ HOSP IP/OBS MODERATE 35: CPT

## 2021-08-23 RX ORDER — PALIPERIDONE 1.5 MG/1
6 TABLET, EXTENDED RELEASE ORAL
Refills: 0 | Status: DISCONTINUED | OUTPATIENT
Start: 2021-08-23 | End: 2021-09-01

## 2021-08-23 RX ADMIN — Medication 50 MILLIGRAM(S): at 21:33

## 2021-08-23 RX ADMIN — HALOPERIDOL DECANOATE 5 MILLIGRAM(S): 100 INJECTION INTRAMUSCULAR at 21:33

## 2021-08-23 RX ADMIN — Medication 2 MILLIGRAM(S): at 21:33

## 2021-08-23 RX ADMIN — PALIPERIDONE 6 MILLIGRAM(S): 1.5 TABLET, EXTENDED RELEASE ORAL at 17:40

## 2021-08-23 NOTE — PHYSICAL THERAPY INITIAL EVALUATION ADULT - CRITERIA FOR SKILLED THERAPEUTIC INTERVENTIONS
Patient educated on importance of using proper technique during sports and ADL./risk reduction/prevention

## 2021-08-23 NOTE — PHYSICAL THERAPY INITIAL EVALUATION ADULT - DISCHARGE DISPOSITION, PT EVAL
Recommend patient demonstrate sport using proper technique. Recommend sending new conmsult upon change in functional status and new onset of pain./no skilled PT needs

## 2021-08-23 NOTE — BH INPATIENT PSYCHIATRY PROGRESS NOTE - NSBHFUPINTERVALHXFT_PSY_A_CORE
Chart reviewed, including vital signs, medication administration record, lab results, and nursing notes.   Case discussed with nursing, psychology, psych rehab, and social work in team meeting.     Patient is seen in the treatment room, in no acute distress, amenable to interviews. States that over the weekend, he had a “flashback“ to a memory of his aunt being murdered. He states that his aunt was trapped in an unhappy marriage, and she attempted to communicate this to him during one of his visits. At the time, he was only [age] years of age, and was unable to do anything to save her. He denies any symptoms of PTSD including hypervigilance, triggers, nightmares, or recurrent flashbacks. He acknowledges making threats against his mother over the telephone. He states that “there is a lot of fishy stuff that’s going on there“. States that his maternal grandfather had a second wife. Poor insight into the severity of threats. We discussed the importance of not making threats while psychiatrically hospitalized. He reports stable sleep and appetite. Denies SI/HI/AVH. Denies medication side effects. We discussed the plan to increase the dose of paliperidone to 6 mg with dinner. He expresses frustration about this, as he does not feel that he need higher doses of medication. He states that “even if I was on 6 mg I could still kill someone“.    Called mother Ron Cornejo at [phone]:  - Called last week wanting mom to get him discharged from the hospital.   - Mother states that he has been doing "the same thing, threatening numerous family members." States that she is scared of his behavior. Does not feel he has improved.    - After last admission he stopped taking medication after being discharged, so she agrees with plan for BOWENS.

## 2021-08-23 NOTE — PHYSICAL THERAPY INITIAL EVALUATION ADULT - PERTINENT HX OF CURRENT PROBLEM, REHAB EVAL
As per chart, patient is a 20 year old man, single, no dependents, domiciled at home with family, currently unemployed, medical history of liver hemangiomas, psychiatric history significant for diagnosis of Schizophrenia diagnosed in April of 2019. Patient referred to PT secondary to left arm pain.

## 2021-08-24 PROCEDURE — 99232 SBSQ HOSP IP/OBS MODERATE 35: CPT

## 2021-08-24 RX ORDER — NICOTINE POLACRILEX 2 MG
2 GUM BUCCAL
Refills: 0 | Status: DISCONTINUED | OUTPATIENT
Start: 2021-08-24 | End: 2021-09-03

## 2021-08-24 RX ADMIN — Medication 2 MILLIGRAM(S): at 18:34

## 2021-08-24 RX ADMIN — PALIPERIDONE 6 MILLIGRAM(S): 1.5 TABLET, EXTENDED RELEASE ORAL at 17:37

## 2021-08-24 NOTE — BH INPATIENT PSYCHIATRY PROGRESS NOTE - NSBHFUPINTERVALHXFT_PSY_A_CORE
Chart reviewed, including vital signs, medication administration record, lab results, and nursing notes.   Case discussed with nursing, psychology, psych rehab, and social work in team meeting.   Per recent nursing note: Pt presents with irritable mood and agitation. Pt reported urges to murder his enemies. When pt asked if he wants to hurt anyone in the hospital he responded no. Pt counseled and given prn to assist in calming down. Pt tolerated well with good effect no further issues to report.     Patient is seen lying in bed, asleep, and awakens and his minimally cooperative with interview. He states that he “hates 6 mg of Invega and hopes to return to 3 mg”. Acknowledges that last night, he received PRN medication, after making threats. We discussed the importance of not making threats while hospitalized. Reports stable sleep and appetite. Denies SI/HI/AVH. He denies that Invega is making him over sedated, and states that he had actually woken up earlier and eaten breakfast.

## 2021-08-25 RX ADMIN — PALIPERIDONE 6 MILLIGRAM(S): 1.5 TABLET, EXTENDED RELEASE ORAL at 17:34

## 2021-08-25 NOTE — BH INPATIENT PSYCHIATRY PROGRESS NOTE - NSBHFUPINTERVALHXFT_PSY_A_CORE
Chart reviewed, including vital signs, medication administration record, lab results, and nursing notes.   Case discussed with nursing, psychology, psych rehab, and social work in team meeting.     Patient is seen in the treatment room, in no acute distress, amenable to interview. States that he spoke with his mother, who wants him to be discharged from the hospital. Denies any side effects from paliperidone. States that “it doesn’t matter what dose I’m on, my thoughts are clear even without this medication.“ States that he admits that he plans on discontinuing his medication after discharge from the hospital. He continues to decline long acting injectable medication. He states that he has “a lot of people to look after“. Reports stable sleep and appetite. Denies SI/HI/AVH.

## 2021-08-26 PROCEDURE — 99231 SBSQ HOSP IP/OBS SF/LOW 25: CPT

## 2021-08-26 RX ADMIN — PALIPERIDONE 6 MILLIGRAM(S): 1.5 TABLET, EXTENDED RELEASE ORAL at 17:16

## 2021-08-26 RX ADMIN — Medication 50 MILLIGRAM(S): at 00:08

## 2021-08-26 RX ADMIN — Medication 650 MILLIGRAM(S): at 11:13

## 2021-08-26 RX ADMIN — Medication 650 MILLIGRAM(S): at 17:09

## 2021-08-26 NOTE — BH INPATIENT PSYCHIATRY PROGRESS NOTE - NSBHFUPINTERVALHXFT_PSY_A_CORE
Chart reviewed, including vital signs, medication administration record, lab results, and nursing notes.   Case discussed with nursing, psychology, psych rehab, and social work in team meeting.     Patient is seen in the treatment room, in no acute distress, amenable to interview. He has an ice pack on his elbow, and states that he has chronically had issues with elbow pain. He continues to decline long acting injectable medication, because the last time he took Abilify Mantana, he experienced difficulty with erectile dysfunction and significant weight gain. He vehemently declines any long acting injectable medication, and acknowledges that he might discontinue his medication after discharge. He agrees to allow the treatment team to contact his psychologist Dr. Cuellar for more information. Reports stable sleep and appetite. Denies medication side effects. Denies SI/HI/AVH.

## 2021-08-27 PROCEDURE — 99232 SBSQ HOSP IP/OBS MODERATE 35: CPT

## 2021-08-27 RX ADMIN — PALIPERIDONE 6 MILLIGRAM(S): 1.5 TABLET, EXTENDED RELEASE ORAL at 18:49

## 2021-08-27 RX ADMIN — Medication 50 MILLIGRAM(S): at 20:27

## 2021-08-27 RX ADMIN — Medication 50 MILLIGRAM(S): at 20:28

## 2021-08-27 RX ADMIN — Medication 2 MILLIGRAM(S): at 14:00

## 2021-08-27 NOTE — BH TREATMENT PLAN - NSDCCRITERIA_PSY_ALL_CORE
Symptom management, safety planning, care coordination
Symptom management, safety planning, care coordination

## 2021-08-27 NOTE — BH TREATMENT PLAN - NSTXDISORGINTERMD_PSY_ALL_CORE
Titrate paliperidone; encourage patient not to make threats
Titrate paliperidone; encourage patient not to make threats

## 2021-08-27 NOTE — BH TREATMENT PLAN - NSTXDCOPNOINTERSW_PSY_ALL_CORE
SW will provide pt with support, discuss aftercare plans, make appropriate referrals for mental health and discuss discharge readiness with team.

## 2021-08-27 NOTE — BH TREATMENT PLAN - NSTXDISORGGOAL_PSY_ALL_CORE
Will demonstrate the ability to maintain reality orientation and communicate clearly with others during 2 conversations with staff daily
Will be able to recognize and clarify two possible misinterpretations of the behaviors and verbalization of others during group or individual sessions

## 2021-08-27 NOTE — BH TREATMENT PLAN - NSTXVIOLNTINTERMD_PSY_ALL_CORE
Titrate paliperidone; encourage patient not to make threats  Encourage BOWENS 
Titrate paliperidone; encourage patient not to make threats

## 2021-08-27 NOTE — BH TREATMENT PLAN - NSTXMEDICGOAL_PSY_ALL_CORE
Be able to describe the benefit of medication/treatment
Be able to describe the benefit of medication/treatment

## 2021-08-27 NOTE — BH TREATMENT PLAN - NSTXIMPULSINTERPR_PSY_ALL_CORE
Pt is making partial progress toward his psych rehab goals on the unit to demonstrate alternative ways of managing his/her emotional state of mind. Pt has maintained better behavioral control on the unit than last week when he was aggressive. Pt is still making threats against his family per collateral from his family. Pt is adherent with his medication. Pt’s mood is better and affect constricted. Pt’s insight are improving but minimally. Pt is attending some group therapy. Pt is not able to tolerate the entire session and structure. Pt is making efforts to complete homework sheets and bibliotherapy offered by writer especially on anger management. Pt denied SI/HI. Pt is using TIPP,distraction skills, radical acceptance, mindfulness, bracelet making, fresh air, socialization and milieu therapy.

## 2021-08-27 NOTE — BH TREATMENT PLAN - NSTXIMPULSGOAL_PSY_ALL_CORE
Will be able to describe/demonstrate alternative ways of managing his/her emotional state on the unit
Will identify 1 strategy to interrupt impulsive thoughts

## 2021-08-27 NOTE — BH TREATMENT PLAN - NSTXPLANTHERAPYSESSIONSFT_PSY_ALL_CORE
08-23-21  --  Type of session: Individual  Level of patient participation: Engaged,Participates  Duration of participation: 30 minutes  Therapy conducted by: Psych rehab  Therapy Summary: Pt is making partial progress toward his psych rehab goals on the unit to demonstrate alternative ways of managing his/her emotional state of mind. Pt has maintained better behavioral control on the unit than last week when he was aggressive. Pt is still making threats against his family per collateral from his family. Pt is adherent with his medication. Pt’s mood is better and affect constricted. Pt’s insight are improving but minimally. Pt is attending some group therapy. Pt is not able to tolerate the entire session and structure. Pt is making efforts to complete homework sheets and bibliotherapy offered by writer especially on anger management. Pt denied SI/HI. Pt is using distraction skills, radical acceptance, mindfulness, bracelet making, fresh air, socialization and milieu therapy.    08-23-21  Type of therapy: Cognitive behavior therapy,Dialectical behavior therapy,Coping skills,Mindfulness,Relapse prevention,Spirituality  Type of session: Individual  Level of patient participation: Engaged  Duration of participation: 30 minutes  Therapy conducted by: Psych rehab  Therapy Summary: Pt is making partial progress toward his psych rehab goals on the unit to demonstrate alternative ways of managing his/her emotional state of mind. Pt has maintained better behavioral control on the unit than last week when he was aggressive. Pt is still making threats against his family per collateral from his family. Pt is adherent with his medication. Pt’s mood is better and affect constricted. Pt’s insight are improving but minimally. Pt is attending some group therapy. Pt is not able to tolerate the entire session and structure. Pt is making efforts to complete homework sheets and bibliotherapy offered by writer especially on anger management. Pt denied SI/HI. Pt is using distraction skills, radical acceptance, mindfulness, bracelet making, fresh air, socialization and milieu therapy.

## 2021-08-27 NOTE — BH TREATMENT PLAN - NSCMSPTSTRENGTHS_PSY_ALL_CORE
Future/goal oriented/Intact family/Motivated/Physically healthy/Resourceful/Unable to obtain (specify)
Future/goal oriented/Intact family/Motivated/Physically healthy/Resourceful/Unable to obtain (specify)

## 2021-08-27 NOTE — BH TREATMENT PLAN - NSTXIMPULSINTERMD_PSY_ALL_CORE
Titrate paliperidone; encourage patient not to make threats
Titrate paliperidone; encourage patient not to make threats  Encourage BOWENS

## 2021-08-27 NOTE — BH TREATMENT PLAN - NSTXVIOLNTGOAL_PSY_ALL_CORE
Will identify how anger may be a response to other feelings
Will identify how anger may be a response to other feelings

## 2021-08-28 RX ADMIN — Medication 2 MILLIGRAM(S): at 20:50

## 2021-08-28 RX ADMIN — Medication 650 MILLIGRAM(S): at 12:59

## 2021-08-28 RX ADMIN — PALIPERIDONE 6 MILLIGRAM(S): 1.5 TABLET, EXTENDED RELEASE ORAL at 17:00

## 2021-08-29 RX ADMIN — Medication 2 MILLIGRAM(S): at 09:20

## 2021-08-29 RX ADMIN — PALIPERIDONE 6 MILLIGRAM(S): 1.5 TABLET, EXTENDED RELEASE ORAL at 18:44

## 2021-08-30 PROCEDURE — 99232 SBSQ HOSP IP/OBS MODERATE 35: CPT

## 2021-08-30 NOTE — BH INPATIENT PSYCHIATRY PROGRESS NOTE - NSBHFUPINTERVALHXFT_PSY_A_CORE
Chart reviewed, including vital signs, medication administration record, lab results, and nursing notes.   Case discussed with nursing, psychology, psych rehab, and social work in team meeting.     Patient is seen in the treatment room, in no acute distress, amenable to interview. Denies having any visitors over the weekend. States that he has been behaving well in the hospital for the past two weeks, and continues to hope that he can be discharged. We discuss the plan to have a family meeting to plan for a safe discharge. He states that his family are triggers to him because his brother is “fake as F“ and states that his father is “acts like some sort of saint or a clemencia, and always tells him that he is a good boy“. He continues to be ambivalent about taking medication after being discharged. Denies any medication side effects. Reports stable sleep and appetite. Denies SI/HI/AVH.

## 2021-08-31 PROCEDURE — 99232 SBSQ HOSP IP/OBS MODERATE 35: CPT

## 2021-08-31 RX ORDER — PALIPERIDONE 1.5 MG/1
234 TABLET, EXTENDED RELEASE ORAL ONCE
Refills: 0 | Status: COMPLETED | OUTPATIENT
Start: 2021-08-31 | End: 2021-08-31

## 2021-08-31 RX ORDER — PALIPERIDONE 1.5 MG/1
117 TABLET, EXTENDED RELEASE ORAL
Qty: 1 | Refills: 0
Start: 2021-08-31 | End: 2021-09-29

## 2021-08-31 RX ORDER — PALIPERIDONE 1.5 MG/1
234 TABLET, EXTENDED RELEASE ORAL ONCE
Refills: 0 | Status: DISCONTINUED | OUTPATIENT
Start: 2021-08-31 | End: 2021-08-31

## 2021-08-31 RX ADMIN — Medication 2 MILLIGRAM(S): at 16:15

## 2021-08-31 RX ADMIN — PALIPERIDONE 234 MILLIGRAM(S): 1.5 TABLET, EXTENDED RELEASE ORAL at 17:20

## 2021-08-31 RX ADMIN — PALIPERIDONE 6 MILLIGRAM(S): 1.5 TABLET, EXTENDED RELEASE ORAL at 18:37

## 2021-08-31 NOTE — BH INPATIENT PSYCHIATRY PROGRESS NOTE - NSBHFUPINTERVALHXFT_PSY_A_CORE
Chart reviewed, including vital signs, medication administration record, lab results, and nursing notes.   Case discussed with nursing, psychology, psych rehab, and social work in team meeting.     Patient is seen in the treatment room, in no acute distress, amenable to interview. He continues to be ambivalent about receiving long acting injectable injection, but ultimately agrees to this. We discussed the recommendation to disclose his side effects to his physician, so that his medication can be adjusted. Denies SI/HI/AVH. Reports stable sleep and appetite.     Family meeting performed over zoom with his father and mother. During this family meeting, the patient continually expresses his hostility at his father, even though there is no apparent cause for this. Father expresses disappointment because he is unaware of why the patient is angry with him. Giovani states that he will cope with living with his father by “just leaving the house“ when he gets angry. Ultimately, his parents feel safe with him being discharged from the hospital if he receives long acting injectable medication. Psychoeducation provided surrounding the role of delusions and schizophrenia, and the importance of not challenging delusional thinking.

## 2021-09-01 PROCEDURE — 99232 SBSQ HOSP IP/OBS MODERATE 35: CPT

## 2021-09-01 RX ORDER — PALIPERIDONE 1.5 MG/1
156 TABLET, EXTENDED RELEASE ORAL ONCE
Refills: 0 | Status: COMPLETED | OUTPATIENT
Start: 2021-09-03 | End: 2021-09-03

## 2021-09-01 NOTE — BH INPATIENT PSYCHIATRY PROGRESS NOTE - NSBHFUPINTERVALHXFT_PSY_A_CORE
Chart reviewed, including vital signs, medication administration record, lab results, and nursing notes.   Case discussed with nursing, psychology, psych rehab, and social work in team meeting.     Patient is seen in the treatment room, in no acute distress, amenable to interview. Acknowledges receiving the BOWENS injection yesterday, and denies any side effects. States that he “cannot speak to his parents“ given that he becomes angry very easily, and plans to  “avoid them“ after leaving the hospital. Reports stable sleep and appetite. Denies SI/HI/AVH. Denies any medication side effects.

## 2021-09-02 VITALS — HEART RATE: 82 BPM

## 2021-09-02 PROCEDURE — 99231 SBSQ HOSP IP/OBS SF/LOW 25: CPT

## 2021-09-02 PROCEDURE — 99232 SBSQ HOSP IP/OBS MODERATE 35: CPT

## 2021-09-02 RX ORDER — PROPRANOLOL HCL 160 MG
1 CAPSULE, EXTENDED RELEASE 24HR ORAL
Qty: 90 | Refills: 0
Start: 2021-09-02 | End: 2021-10-01

## 2021-09-02 RX ORDER — PALIPERIDONE 1.5 MG/1
117 TABLET, EXTENDED RELEASE ORAL
Qty: 1 | Refills: 0
Start: 2021-09-02

## 2021-09-02 NOTE — BH DISCHARGE NOTE NURSING/SOCIAL WORK/PSYCH REHAB - PATIENT PORTAL LINK FT
You can access the FollowMyHealth Patient Portal offered by [facility] by registering at the following website: [email or web address]. By joining [facility]’s FollowMyHealth portal, you will also be able to view your health information using other applications (apps) compatible with our system.

## 2021-09-02 NOTE — BH INPATIENT PSYCHIATRY DISCHARGE NOTE - NSDCMRMEDTOKEN_GEN_ALL_CORE_FT
Cheryl Estradaenna 117 mg/0.75 mL intramuscular suspension, extended release: 117 milligram(s) intramuscularly every 4 weeks   propranolol 20 mg oral tablet: 1 tab(s) orally 3 times a day

## 2021-09-02 NOTE — BH DISCHARGE NOTE NURSING/SOCIAL WORK/PSYCH REHAB - MODE OF TRANSPORTATION
Pt will be picked up by parents: returning home to [address] [place] [id number]/Ambulatory

## 2021-09-02 NOTE — BH INPATIENT PSYCHIATRY DISCHARGE NOTE - VIOLENCE RISK FACTORS:
Violent ideation/threat/speech/Lack of insight into violence risk/need for treatment/Noncompliance with treatment/History of violation of a legal mandate (e.g., parole, probation, AOT)

## 2021-09-02 NOTE — BH INPATIENT PSYCHIATRY PROGRESS NOTE - NSBHFUPINTERVALHXFT_PSY_A_CORE
Chart reviewed, including vital signs, medication administration record, lab results, and nursing notes.   Case discussed with nursing, psychology, psych rehab, and social work in team meeting.     Patient is seen in the treatment room, in no acute distress, amenable to interview. States that he “feels the same“ since receiving his injection of Invega. States that “his family needs to change“, and we discussed the importance of focusing on himself. He states that propranolol has continued to help with his anxiety and restlessness, and he states that “propranolol is the most helpful medication of all“. Reports stable sleep and appetite. Denies SI/HI/AVH. Denies any medication side effects. He agrees with the plan for discharge tomorrow. 

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
## 2021-09-02 NOTE — BH INPATIENT PSYCHIATRY DISCHARGE NOTE - DESCRIPTION
dropped out of [employer] college, lives at home with family, unemployed
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
## 2021-09-02 NOTE — BH DISCHARGE NOTE NURSING/SOCIAL WORK/PSYCH REHAB - NSDCADDINFO1FT_PSY_ALL_CORE
Be informed you will also be receiving a call from your doctor for your appointment  Be informed you will also be receiving a call from your doctor for your appointment.   FYI- Your TASK team  Albertina has also been informed that your are being discharged today 9/3/2021 and be following up with the ETP team.
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
## 2021-09-02 NOTE — BH DISCHARGE NOTE NURSING/SOCIAL WORK/PSYCH REHAB - DISCHARGE INSTRUCTIONS AFTERCARE APPOINTMENTS
In order to check the location, date, or time of your aftercare appointment, please refer to your Discharge Instructions Document given to you upon leaving the hospital.  If you have lost the instructions please call [phone]

## 2021-09-02 NOTE — BH INPATIENT PSYCHIATRY DISCHARGE NOTE - NSBHMETABOLIC_PSY_ALL_CORE_FT
BMI: BMI (kg/m2): 25.5 (08-14-21 @ 22:28)  HbA1c: A1C with Estimated Average Glucose Result: 5.2 % (07-04-21 @ 09:35)    Glucose:   BP: 122/65 (09-02-21 @ 07:59) (122/65 - 140/84)  Lipid Panel: Date/Time: 07-04-21 @ 09:35  Cholesterol, Serum: 169  Direct LDL: --  HDL Cholesterol, Serum: 35  Total Cholesterol/HDL Ration Measurement: --  Triglycerides, Serum: 90

## 2021-09-02 NOTE — BH DISCHARGE NOTE NURSING/SOCIAL WORK/PSYCH REHAB - NSDCPRGOAL_PSY_ALL_CORE
Pt was seen individually by psych rehab staff member. Pt was able to complete safety plan, and was provided with a press-ganey survey prior to discharge. Below information is based on individual session, medical records, and treatment team report. Prior to discharge pt was maintaining good behavioral control and was no longer aggressive. Additionally, pt was not threatening towards his family. Pt was more social and engaged with peers. He could often be observed smiling and conversing. Pt was adherent with his medication; he received BOWENS injection. Pt reported improvement in his mood and behavioral control. Prior to discharged, pt denied SI/HI and AH/VH. Pt was hopeful and future oriented. Pt's affect was noted to be constricted. Pt’s insight and judgement was improving but remain limited. Pt attended approximately 75 % of psych rehab groups. At times pt had difficulty tolerating full group sessions.  Pt was able to identify various coping skills including using distraction skills, TIPP, Self Soothing, Give, and radical acceptance.

## 2021-09-02 NOTE — BH DISCHARGE NOTE NURSING/SOCIAL WORK/PSYCH REHAB - NSCDUDCCRISIS_PSY_A_CORE
[facility] Well  1 [phone]) [facility]-WELL ([phone])  Text "WELL" to [phone]  Website: www.Naytev/.[employer] [facility] 1 (799) 901-HXYS (2243) Website: www.safehorizon.org/.National Suicide Prevention Lifeline 1 (358) 970-7487/.  [facility]  1 (745) LIFENET (456-7202)/.  [facility]  [phone] [place] [address] [phone]   Hours:  Monday through Friday from 9 AM to 3 PM/.  [place] Dept of  Affairs - Veterans Crisis Line  9 [phone], Option 1

## 2021-09-03 PROCEDURE — 99238 HOSP IP/OBS DSCHRG MGMT 30/<: CPT

## 2021-09-03 PROCEDURE — 99231 SBSQ HOSP IP/OBS SF/LOW 25: CPT

## 2021-09-03 RX ADMIN — PALIPERIDONE 156 MILLIGRAM(S): 1.5 TABLET, EXTENDED RELEASE ORAL at 08:53

## 2021-09-03 NOTE — BH INPATIENT PSYCHIATRY PROGRESS NOTE - NSTXVIOLNTINTERMD_PSY_ALL_CORE
Titrate paliperidone; encourage patient not to make threats  Encourage BOWENS 
Titrate paliperidone; encourage patient not to make threats
Titrate paliperidone; encourage patient not to make threats  Encourage BOWENS 
Titrate paliperidone; encourage patient not to make threats
Titrate paliperidone; encourage patient not to make threats  Encourage BOWENS 
Titrate paliperidone; encourage patient not to make threats  Encourage BOWENS

## 2021-09-03 NOTE — BH INPATIENT PSYCHIATRY PROGRESS NOTE - NSTXPROBDCOPNO_PSY_ALL_CORE
DISCHARGE ISSUE - NON-ADHERENT WITH OUTPATIENT SERVICES

## 2021-09-03 NOTE — BH INPATIENT PSYCHIATRY PROGRESS NOTE - NSTXPROBIMPULS_PSY_ALL_CORE
IMPULSIVITY/AGITATION

## 2021-09-03 NOTE — BH INPATIENT PSYCHIATRY PROGRESS NOTE - NSBHMSEPERCEPT_PSY_A_CORE
No abnormalities
No abnormalities/Other
No abnormalities
No abnormalities/Other
No abnormalities
No abnormalities
No abnormalities/Other
No abnormalities
No abnormalities/Other
No abnormalities

## 2021-09-03 NOTE — BH INPATIENT PSYCHIATRY PROGRESS NOTE - NSTXIMPULSDATEEST_PSY_ALL_CORE
16-Aug-2021
02-Sep-2021
16-Aug-2021
16-Aug-2021
19-Aug-2021
19-Aug-2021

## 2021-09-03 NOTE — BH INPATIENT PSYCHIATRY PROGRESS NOTE - NSBHMSETHTCONTENT_PSY_A_CORE
Unremarkable
Delusions/Preoccupations/Homicidality
Unremarkable
Unremarkable
Ideas of reference/Preoccupations/Other
Ideas of reference/Preoccupations/Other
Unremarkable
Unremarkable
Ideas of reference/Preoccupations/Other
Unremarkable
Ideas of reference/Preoccupations/Other
Unremarkable
Delusions/Preoccupations/Homicidality
Ideas of reference/Preoccupations/Other
Ideas of reference/Preoccupations/Other
Unremarkable

## 2021-09-03 NOTE — BH INPATIENT PSYCHIATRY PROGRESS NOTE - NSTXDISORGDATETRGT_PSY_ALL_CORE
26-Aug-2021
21-Aug-2021
26-Aug-2021
21-Aug-2021
26-Aug-2021
21-Aug-2021
26-Aug-2021
21-Aug-2021
26-Aug-2021

## 2021-09-03 NOTE — BH INPATIENT PSYCHIATRY PROGRESS NOTE - NSBHMSEGAIT_PSY_A_CORE
Normal gait / station

## 2021-09-03 NOTE — BH INPATIENT PSYCHIATRY PROGRESS NOTE - NSTXDCOPNODATEEST_PSY_ALL_CORE
30-Aug-2021
23-Aug-2021
23-Aug-2021
16-Aug-2021
19-Aug-2021
16-Aug-2021
16-Aug-2021
30-Aug-2021
30-Aug-2021
23-Aug-2021
23-Aug-2021
30-Aug-2021
16-Aug-2021
23-Aug-2021
19-Aug-2021
30-Aug-2021

## 2021-09-03 NOTE — BH INPATIENT PSYCHIATRY PROGRESS NOTE - NSBHMSEIMPULSE_PSY_A_CORE
Normal
Normal
Impaired
Normal
Impaired
Normal

## 2021-09-03 NOTE — BH INPATIENT PSYCHIATRY PROGRESS NOTE - NSTXIMPULSPROGRES_PSY_ALL_CORE
Improving
No Change
Improving
No Change
Improving
Improving
Met - goal discontinued

## 2021-09-03 NOTE — BH INPATIENT PSYCHIATRY PROGRESS NOTE - NSTXDCOPNOPROGRES_PSY_ALL_CORE
No Change
Improving
No Change
Improving
No Change
Improving
No Change
Improving
No Change
No Change
Improving

## 2021-09-03 NOTE — BH INPATIENT PSYCHIATRY PROGRESS NOTE - NSTXDCOPNODATENEW_PSY_ALL_CORE
26-Aug-2021

## 2021-09-03 NOTE — BH INPATIENT PSYCHIATRY PROGRESS NOTE - MSE OPTIONS
Structured MSE

## 2021-09-03 NOTE — BH INPATIENT PSYCHIATRY PROGRESS NOTE - NSTXDCOPNOINTERMD_PSY_ALL_CORE
Convince patient to take BOWENS

## 2021-09-03 NOTE — BH INPATIENT PSYCHIATRY PROGRESS NOTE - NSTXDISORGINTERMD_PSY_ALL_CORE
Titrate paliperidone; encourage patient not to make threats

## 2021-09-03 NOTE — BH INPATIENT PSYCHIATRY PROGRESS NOTE - NSBHLEGALSTATUSDATE_PSY_ALL_CORE
27-Aug-2021 16:48

## 2021-09-03 NOTE — BH INPATIENT PSYCHIATRY PROGRESS NOTE - NSBHMETABOLIC_PSY_ALL_CORE_FT
BMI: BMI (kg/m2): 25.5 (08-14-21 @ 22:28)  HbA1c: A1C with Estimated Average Glucose Result: 5.2 % (07-04-21 @ 09:35)    Glucose:   BP: 122/65 (09-02-21 @ 07:59) (122/65 - 125/68)  Lipid Panel: Date/Time: 07-04-21 @ 09:35  Cholesterol, Serum: 169  Direct LDL: --  HDL Cholesterol, Serum: 35  Total Cholesterol/HDL Ration Measurement: --  Triglycerides, Serum: 90  
BMI: BMI (kg/m2): 25.5 (08-14-21 @ 22:28)  HbA1c: A1C with Estimated Average Glucose Result: 5.2 % (07-04-21 @ 09:35)    Glucose:   BP: 129/74 (08-24-21 @ 08:29) (125/82 - 129/74)  Lipid Panel: Date/Time: 07-04-21 @ 09:35  Cholesterol, Serum: 169  Direct LDL: --  HDL Cholesterol, Serum: 35  Total Cholesterol/HDL Ration Measurement: --  Triglycerides, Serum: 90  
BMI: BMI (kg/m2): 25.5 (08-14-21 @ 22:28)  HbA1c: A1C with Estimated Average Glucose Result: 5.2 % (07-04-21 @ 09:35)    Glucose:   BP: 141/86 (08-17-21 @ 06:39) (101/68 - 141/86)  Lipid Panel: Date/Time: 07-04-21 @ 09:35  Cholesterol, Serum: 169  Direct LDL: --  HDL Cholesterol, Serum: 35  Total Cholesterol/HDL Ration Measurement: --  Triglycerides, Serum: 90  
BMI: BMI (kg/m2): 25.5 (08-14-21 @ 22:28)  HbA1c: A1C with Estimated Average Glucose Result: 5.2 % (07-04-21 @ 09:35)    Glucose:   BP: 141/86 (08-17-21 @ 06:39) (101/68 - 141/86)  Lipid Panel: Date/Time: 07-04-21 @ 09:35  Cholesterol, Serum: 169  Direct LDL: --  HDL Cholesterol, Serum: 35  Total Cholesterol/HDL Ration Measurement: --  Triglycerides, Serum: 90  
BMI: BMI (kg/m2): 25.5 (08-14-21 @ 22:28)  HbA1c: A1C with Estimated Average Glucose Result: 5.2 % (07-04-21 @ 09:35)    Glucose:   BP: 122/80 (08-19-21 @ 08:12) (122/80 - 141/86)  Lipid Panel: Date/Time: 07-04-21 @ 09:35  Cholesterol, Serum: 169  Direct LDL: --  HDL Cholesterol, Serum: 35  Total Cholesterol/HDL Ration Measurement: --  Triglycerides, Serum: 90  
BMI: BMI (kg/m2): 25.5 (08-14-21 @ 22:28)  HbA1c: A1C with Estimated Average Glucose Result: 5.2 % (07-04-21 @ 09:35)    Glucose:   BP: 122/65 (09-02-21 @ 07:59) (122/65 - 140/84)  Lipid Panel: Date/Time: 07-04-21 @ 09:35  Cholesterol, Serum: 169  Direct LDL: --  HDL Cholesterol, Serum: 35  Total Cholesterol/HDL Ration Measurement: --  Triglycerides, Serum: 90  
BMI: BMI (kg/m2): 25.5 (08-14-21 @ 22:28)  HbA1c: A1C with Estimated Average Glucose Result: 5.2 % (07-04-21 @ 09:35)    Glucose:   BP: 127/77 (08-16-21 @ 07:46) (101/68 - 146/94)  Lipid Panel: Date/Time: 07-04-21 @ 09:35  Cholesterol, Serum: 169  Direct LDL: --  HDL Cholesterol, Serum: 35  Total Cholesterol/HDL Ration Measurement: --  Triglycerides, Serum: 90  
BMI: BMI (kg/m2): 25.5 (08-14-21 @ 22:28)  HbA1c: A1C with Estimated Average Glucose Result: 5.2 % (07-04-21 @ 09:35)    Glucose:   BP: 125/82 (08-23-21 @ 06:29) (125/82 - 135/84)  Lipid Panel: Date/Time: 07-04-21 @ 09:35  Cholesterol, Serum: 169  Direct LDL: --  HDL Cholesterol, Serum: 35  Total Cholesterol/HDL Ration Measurement: --  Triglycerides, Serum: 90  
BMI: BMI (kg/m2): 25.5 (08-14-21 @ 22:28)  HbA1c: A1C with Estimated Average Glucose Result: 5.2 % (07-04-21 @ 09:35)    Glucose:   BP: 122/80 (08-19-21 @ 08:12) (122/80 - 124/68)  Lipid Panel: Date/Time: 07-04-21 @ 09:35  Cholesterol, Serum: 169  Direct LDL: --  HDL Cholesterol, Serum: 35  Total Cholesterol/HDL Ration Measurement: --  Triglycerides, Serum: 90  
BMI: BMI (kg/m2): 25.5 (08-14-21 @ 22:28)  HbA1c: A1C with Estimated Average Glucose Result: 5.2 % (07-04-21 @ 09:35)    Glucose:   BP: 140/84 (08-30-21 @ 20:26) (108/65 - 140/84)  Lipid Panel: Date/Time: 07-04-21 @ 09:35  Cholesterol, Serum: 169  Direct LDL: --  HDL Cholesterol, Serum: 35  Total Cholesterol/HDL Ration Measurement: --  Triglycerides, Serum: 90  
BMI: BMI (kg/m2): 25.5 (08-14-21 @ 22:28)  HbA1c: A1C with Estimated Average Glucose Result: 5.2 % (07-04-21 @ 09:35)    Glucose:   BP: 132/75 (08-27-21 @ 06:21) (126/94 - 137/77)  Lipid Panel: Date/Time: 07-04-21 @ 09:35  Cholesterol, Serum: 169  Direct LDL: --  HDL Cholesterol, Serum: 35  Total Cholesterol/HDL Ration Measurement: --  Triglycerides, Serum: 90  
BMI: BMI (kg/m2): 25.5 (08-14-21 @ 22:28)  HbA1c: A1C with Estimated Average Glucose Result: 5.2 % (07-04-21 @ 09:35)    Glucose:   BP: 124/68 (08-18-21 @ 08:10) (124/68 - 141/86)  Lipid Panel: Date/Time: 07-04-21 @ 09:35  Cholesterol, Serum: 169  Direct LDL: --  HDL Cholesterol, Serum: 35  Total Cholesterol/HDL Ration Measurement: --  Triglycerides, Serum: 90  
BMI: BMI (kg/m2): 25.5 (08-14-21 @ 22:28)  HbA1c: A1C with Estimated Average Glucose Result: 5.2 % (07-04-21 @ 09:35)    Glucose:   BP: 129/74 (08-24-21 @ 08:29) (125/82 - 129/74)  Lipid Panel: Date/Time: 07-04-21 @ 09:35  Cholesterol, Serum: 169  Direct LDL: --  HDL Cholesterol, Serum: 35  Total Cholesterol/HDL Ration Measurement: --  Triglycerides, Serum: 90  
BMI: BMI (kg/m2): 25.5 (08-14-21 @ 22:28)  HbA1c: A1C with Estimated Average Glucose Result: 5.2 % (07-04-21 @ 09:35)    Glucose:   BP: 125/68 (09-01-21 @ 06:20) (108/65 - 140/84)  Lipid Panel: Date/Time: 07-04-21 @ 09:35  Cholesterol, Serum: 169  Direct LDL: --  HDL Cholesterol, Serum: 35  Total Cholesterol/HDL Ration Measurement: --  Triglycerides, Serum: 90  
BMI: BMI (kg/m2): 25.5 (08-14-21 @ 22:28)  HbA1c: A1C with Estimated Average Glucose Result: 5.2 % (07-04-21 @ 09:35)    Glucose:   BP: 126/94 (08-26-21 @ 07:44) (126/94 - 129/74)  Lipid Panel: Date/Time: 07-04-21 @ 09:35  Cholesterol, Serum: 169  Direct LDL: --  HDL Cholesterol, Serum: 35  Total Cholesterol/HDL Ration Measurement: --  Triglycerides, Serum: 90  
BMI: BMI (kg/m2): 25.5 (08-14-21 @ 22:28)  HbA1c: A1C with Estimated Average Glucose Result: 5.2 % (07-04-21 @ 09:35)    Glucose:   BP: 108/65 (08-30-21 @ 06:47) (108/65 - 129/73)  Lipid Panel: Date/Time: 07-04-21 @ 09:35  Cholesterol, Serum: 169  Direct LDL: --  HDL Cholesterol, Serum: 35  Total Cholesterol/HDL Ration Measurement: --  Triglycerides, Serum: 90

## 2021-09-03 NOTE — BH INPATIENT PSYCHIATRY PROGRESS NOTE - NSTXDCOPNODATETRGT_PSY_ALL_CORE
23-Aug-2021
06-Sep-2021
30-Aug-2021
30-Aug-2021
06-Sep-2021
30-Aug-2021
06-Sep-2021
23-Aug-2021
30-Aug-2021
23-Aug-2021
26-Aug-2021
26-Aug-2021
06-Sep-2021
23-Aug-2021
06-Sep-2021
30-Aug-2021

## 2021-09-03 NOTE — BH INPATIENT PSYCHIATRY PROGRESS NOTE - NSTXMEDICPROGRES_PSY_ALL_CORE
Improving
No Change
Improving
No Change
No Change
Improving
No Change
Improving
No Change
Improving
Improving

## 2021-09-03 NOTE — BH INPATIENT PSYCHIATRY PROGRESS NOTE - NSBHFUPINTERVALHXFT_PSY_A_CORE
Chart reviewed, including vital signs, medication administration record, lab results, and nursing notes.   Case discussed with nursing, psychology, psych rehab, and social work in team meeting.     Patient is seen in the treatment room, in no acute distress, amenable to interview. States that he has some mild injection site pain after receiving long acting injectable medication. He states that since receiving Invega, he has felt “more attentive“, though he is not sure if this is a good thing. He feels that this medication will not affect his mood or keep him from getting mad. He agrees with the plan for discharge today. Denies SI/HI/AVH. Denies any medication side effects other than injection site pain.

## 2021-09-03 NOTE — BH INPATIENT PSYCHIATRY PROGRESS NOTE - NSTXDCOPNOGOAL_PSY_ALL_CORE
Will agree to participate in appropriate outpatient care

## 2021-09-03 NOTE — BH INPATIENT PSYCHIATRY PROGRESS NOTE - NSTXMEDICDATEEST_PSY_ALL_CORE
14-Aug-2021
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
## 2021-09-03 NOTE — BH INPATIENT PSYCHIATRY PROGRESS NOTE - NSTXDISORGDATEEST_PSY_ALL_CORE
14-Aug-2021
19-Aug-2021
19-Aug-2021
14-Aug-2021
19-Aug-2021
14-Aug-2021
14-Aug-2021
19-Aug-2021

## 2021-09-03 NOTE — BH INPATIENT PSYCHIATRY PROGRESS NOTE - NSDCCRITERIA_PSY_ALL_CORE
Symptom management, safety planning, care coordination

## 2021-09-03 NOTE — BH INPATIENT PSYCHIATRY PROGRESS NOTE - NSTXPROBMEDIC_PSY_ALL_CORE
MEDICATION/TREATMENT NON-COMPLIANCE

## 2021-09-03 NOTE — BH INPATIENT PSYCHIATRY PROGRESS NOTE - NSTXDISORGGOAL_PSY_ALL_CORE
Will demonstrate the ability to maintain reality orientation and communicate clearly with others during 2 conversations with staff daily
Will be able to recognize and clarify two possible misinterpretations of the behaviors and verbalization of others during group or individual sessions
Will demonstrate the ability to maintain reality orientation and communicate clearly with others during 2 conversations with staff daily
Will demonstrate the ability to maintain reality orientation and communicate clearly with others during 2 conversations with staff daily
Will be able to recognize and clarify two possible misinterpretations of the behaviors and verbalization of others during group or individual sessions
Will demonstrate the ability to maintain reality orientation and communicate clearly with others during 2 conversations with staff daily
Will demonstrate the ability to maintain reality orientation and communicate clearly with others during 2 conversations with staff daily
Will be able to recognize and clarify two possible misinterpretations of the behaviors and verbalization of others during group or individual sessions

## 2021-09-03 NOTE — BH INPATIENT PSYCHIATRY PROGRESS NOTE - NSTXIMPULSGOAL_PSY_ALL_CORE
Will be able to describe/demonstrate alternative ways of managing his/her emotional state on the unit
Will be able to describe/demonstrate alternative ways of managing his/her emotional state on the unit
Will identify 1 strategy to interrupt impulsive thoughts
Will be able to describe/demonstrate alternative ways of managing his/her emotional state on the unit
Will identify 1 strategy to interrupt impulsive thoughts
Will be able to describe/demonstrate alternative ways of managing his/her emotional state on the unit

## 2021-09-03 NOTE — BH INPATIENT PSYCHIATRY PROGRESS NOTE - GENERAL APPEARANCE
No deformities present

## 2021-09-03 NOTE — BH INPATIENT PSYCHIATRY PROGRESS NOTE - NSTXMEDICDATETRGT_PSY_ALL_CORE
21-Aug-2021
21-Aug-2021
24-Aug-2021
21-Aug-2021
21-Aug-2021
24-Aug-2021
21-Aug-2021
24-Aug-2021

## 2021-09-03 NOTE — BH INPATIENT PSYCHIATRY PROGRESS NOTE - NSTXDISORGPROGRES_PSY_ALL_CORE
No Change
Improving
Improving
No Change
Improving
No Change
Improving
No Change
Improving

## 2021-09-03 NOTE — BH INPATIENT PSYCHIATRY PROGRESS NOTE - NSICDXBHPRIMARYDX_PSY_ALL_CORE
Schizophrenia   F20.9  

## 2021-09-03 NOTE — BH INPATIENT PSYCHIATRY PROGRESS NOTE - NSTXPROBDISORG_PSY_ALL_CORE
DISORGANIZATION OF THOUGHT/BEHAVIOR

## 2021-09-03 NOTE — BH INPATIENT PSYCHIATRY PROGRESS NOTE - NSTXIMPULSINTERMD_PSY_ALL_CORE
Titrate paliperidone; encourage patient not to make threats  Encourage BOWENS 
Titrate paliperidone; encourage patient not to make threats  Encourage BOWENS 
Titrate paliperidone; encourage patient not to make threats
Titrate paliperidone; encourage patient not to make threats  Encourage BOWENS 
Titrate paliperidone; encourage patient not to make threats  Encourage BOWENS 
Titrate paliperidone; encourage patient not to make threats
Titrate paliperidone; encourage patient not to make threats  Encourage BOWENS 
Titrate paliperidone; encourage patient not to make threats  Encourage BOWENS

## 2021-09-03 NOTE — BH INPATIENT PSYCHIATRY PROGRESS NOTE - NSBHCONSBHPROVDETAILS_PSY_A_CORE  FT
Outpatient provider on vacation
Called Dr. Cuellar at [phone]:    - Night and day difference when he takes a BOWENS. Patient was stable on Abilify BOWENS for more than a year. After he came off it, he relapsed after about 6 months.  - Per Dr. Cuellar family is very reasonable and supportive. His animosity towards his parents is related to his schiozphrenia diagnosis, rather than family dynamic problems.  - Thinks there is a high chance that he would decompensate if he returns home. Feels that he needs ACT/AOT and PHP if patient is willing. 
Called Dr. Cuellar at [phone]:    - Night and day difference when he takes a BOWENS. Patient was stable on Abilify BOWENS for more than a year. After he came off it, he relapsed after about 6 months.  - Per Dr. Cuellar family is very reasonable and supportive. His animosity towards his parents is related to his schiozphrenia diagnosis, rather than family dynamic problems.  - Thinks there is a high chance that he would decompensate if he returns home. Feels that he needs ACT/AOT and PHP if patient is willing. 
Outpatient provider on vacation
Outpatient provider on vacation
Called Dr. Cuellar at [phone]:    - Night and day difference when he takes a BOWENS. Patient was stable on Abilify BOWENS for more than a year. After he came off it, he relapsed after about 6 months.  - Per Dr. Cuellar family is very reasonable and supportive. His animosity towards his parents is related to his schiozphrenia diagnosis, rather than family dynamic problems.  - Thinks there is a high chance that he would decompensate if he returns home. Feels that he needs ACT/AOT and PHP if patient is willing. 
Outpatient provider Dr. Evangelista on vacation  Attempting to contact Dr. Cuellar
Outpatient provider on vacation
Called Dr. Cuellar at [phone]:    - Night and day difference when he takes a BOWENS. Patient was stable on Abilify BOWENS for more than a year. After he came off it, he relapsed after about 6 months.  - Per Dr. Cuellar family is very reasonable and supportive. His animosity towards his parents is related to his schiozphrenia diagnosis, rather than family dynamic problems.  - Thinks there is a high chance that he would decompensate if he returns home. Feels that he needs ACT/AOT and PHP if patient is willing. 
Outpatient provider on vacation
Outpatient provider Dr. Evangelista on vacation  Attempting to contact Dr. Cuellar
Called Dr. Cuellar at [phone]:    - Night and day difference when he takes a BOWENS. Patient was stable on Abilify BOWENS for more than a year. After he came off it, he relapsed after about 6 months.  - Per Dr. Cuellar family is very reasonable and supportive. His animosity towards his parents is related to his schiozphrenia diagnosis, rather than family dynamic problems.  - Thinks there is a high chance that he would decompensate if he returns home. Feels that he needs ACT/AOT and PHP if patient is willing.

## 2021-09-03 NOTE — BH INPATIENT PSYCHIATRY PROGRESS NOTE - CURRENT MEDICATION
MEDICATIONS  (STANDING):  paliperidone ER 6 milliGRAM(s) Oral <User Schedule>  propranolol 20 milliGRAM(s) Oral <User Schedule>    MEDICATIONS  (PRN):  acetaminophen   Tablet .. 650 milliGRAM(s) Oral every 6 hours PRN Mild Pain (1 - 3), Moderate Pain (4 - 6)  diphenhydrAMINE 50 milliGRAM(s) Oral every 6 hours PRN agitation  diphenhydrAMINE   Injectable 50 milliGRAM(s) IntraMuscular once PRN agitation  haloperidol     Tablet 5 milliGRAM(s) Oral every 6 hours PRN agitation  haloperidol    Injectable 5 milliGRAM(s) IntraMuscular once PRN Agitation  LORazepam     Tablet 2 milliGRAM(s) Oral every 6 hours PRN Agitation  LORazepam   Injectable 2 milliGRAM(s) IntraMuscular Once PRN Agitation  traZODone 50 milliGRAM(s) Oral at bedtime PRN insomnia  
MEDICATIONS  (STANDING):  paliperidone ER 6 milliGRAM(s) Oral <User Schedule>  propranolol 20 milliGRAM(s) Oral <User Schedule>    MEDICATIONS  (PRN):  acetaminophen   Tablet .. 650 milliGRAM(s) Oral every 6 hours PRN Mild Pain (1 - 3), Moderate Pain (4 - 6)  diphenhydrAMINE 50 milliGRAM(s) Oral every 6 hours PRN agitation  diphenhydrAMINE   Injectable 50 milliGRAM(s) IntraMuscular once PRN agitation  haloperidol     Tablet 5 milliGRAM(s) Oral every 6 hours PRN agitation  haloperidol    Injectable 5 milliGRAM(s) IntraMuscular once PRN Agitation  LORazepam   Injectable 2 milliGRAM(s) IntraMuscular once PRN Agitation  nicotine  Polacrilex Gum 2 milliGRAM(s) Oral every 2 hours PRN cravings  traZODone 50 milliGRAM(s) Oral at bedtime PRN insomnia  
MEDICATIONS  (STANDING):  paliperidone ER 6 milliGRAM(s) Oral <User Schedule>  propranolol 10 milliGRAM(s) Oral <User Schedule>  propranolol 20 milliGRAM(s) Oral <User Schedule>    MEDICATIONS  (PRN):  acetaminophen   Tablet .. 650 milliGRAM(s) Oral every 6 hours PRN Mild Pain (1 - 3), Moderate Pain (4 - 6)  diphenhydrAMINE 50 milliGRAM(s) Oral every 6 hours PRN agitation  diphenhydrAMINE   Injectable 50 milliGRAM(s) IntraMuscular once PRN agitation  haloperidol     Tablet 5 milliGRAM(s) Oral every 6 hours PRN agitation  haloperidol    Injectable 5 milliGRAM(s) IntraMuscular once PRN Agitation  LORazepam   Injectable 2 milliGRAM(s) IntraMuscular once PRN Agitation  nicotine  Polacrilex Gum 2 milliGRAM(s) Oral every 2 hours PRN cravings  traZODone 50 milliGRAM(s) Oral at bedtime PRN insomnia  
MEDICATIONS  (STANDING):  propranolol 10 milliGRAM(s) Oral <User Schedule>  propranolol 20 milliGRAM(s) Oral <User Schedule>    MEDICATIONS  (PRN):  acetaminophen   Tablet .. 650 milliGRAM(s) Oral every 6 hours PRN Mild Pain (1 - 3), Moderate Pain (4 - 6)  diphenhydrAMINE 50 milliGRAM(s) Oral every 6 hours PRN agitation  diphenhydrAMINE   Injectable 50 milliGRAM(s) IntraMuscular once PRN agitation  haloperidol     Tablet 5 milliGRAM(s) Oral every 6 hours PRN agitation  haloperidol    Injectable 5 milliGRAM(s) IntraMuscular once PRN Agitation  LORazepam   Injectable 2 milliGRAM(s) IntraMuscular once PRN Agitation  nicotine  Polacrilex Gum 2 milliGRAM(s) Oral every 2 hours PRN cravings  traZODone 50 milliGRAM(s) Oral at bedtime PRN insomnia  
MEDICATIONS  (STANDING):  paliperidone ER 6 milliGRAM(s) Oral <User Schedule>  propranolol 10 milliGRAM(s) Oral <User Schedule>  propranolol 20 milliGRAM(s) Oral <User Schedule>    MEDICATIONS  (PRN):  acetaminophen   Tablet .. 650 milliGRAM(s) Oral every 6 hours PRN Mild Pain (1 - 3), Moderate Pain (4 - 6)  diphenhydrAMINE 50 milliGRAM(s) Oral every 6 hours PRN agitation  diphenhydrAMINE   Injectable 50 milliGRAM(s) IntraMuscular once PRN agitation  haloperidol     Tablet 5 milliGRAM(s) Oral every 6 hours PRN agitation  haloperidol    Injectable 5 milliGRAM(s) IntraMuscular once PRN Agitation  LORazepam   Injectable 2 milliGRAM(s) IntraMuscular once PRN Agitation  nicotine  Polacrilex Gum 2 milliGRAM(s) Oral every 2 hours PRN cravings  traZODone 50 milliGRAM(s) Oral at bedtime PRN insomnia  
MEDICATIONS  (STANDING):  paliperidone ER 3 milliGRAM(s) Oral at bedtime  propranolol 20 milliGRAM(s) Oral <User Schedule>    MEDICATIONS  (PRN):  diphenhydrAMINE 50 milliGRAM(s) Oral every 6 hours PRN agitation  diphenhydrAMINE   Injectable 50 milliGRAM(s) IntraMuscular once PRN agitation  haloperidol     Tablet 5 milliGRAM(s) Oral every 6 hours PRN agitation  haloperidol    Injectable 5 milliGRAM(s) IntraMuscular once PRN Agitation  LORazepam     Tablet 2 milliGRAM(s) Oral every 6 hours PRN Agitation  LORazepam   Injectable 2 milliGRAM(s) IntraMuscular Once PRN Agitation  traZODone 50 milliGRAM(s) Oral at bedtime PRN insomnia  
MEDICATIONS  (STANDING):  paliperidone ER 3 milliGRAM(s) Oral at bedtime  propranolol 20 milliGRAM(s) Oral <User Schedule>    MEDICATIONS  (PRN):  diphenhydrAMINE 50 milliGRAM(s) Oral every 6 hours PRN agitation  diphenhydrAMINE   Injectable 50 milliGRAM(s) IntraMuscular once PRN agitation  haloperidol     Tablet 5 milliGRAM(s) Oral every 6 hours PRN agitation  haloperidol    Injectable 5 milliGRAM(s) IntraMuscular once PRN Agitation  LORazepam     Tablet 2 milliGRAM(s) Oral every 6 hours PRN Agitation  LORazepam   Injectable 2 milliGRAM(s) IntraMuscular Once PRN Agitation  traZODone 50 milliGRAM(s) Oral at bedtime PRN insomnia  
MEDICATIONS  (STANDING):  clonazePAM  Tablet 0.5 milliGRAM(s) Oral two times a day  paliperidone ER 3 milliGRAM(s) Oral at bedtime    MEDICATIONS  (PRN):  diphenhydrAMINE 50 milliGRAM(s) Oral every 6 hours PRN agitation  diphenhydrAMINE   Injectable 50 milliGRAM(s) IntraMuscular once PRN agitation  haloperidol     Tablet 5 milliGRAM(s) Oral every 6 hours PRN agitation  haloperidol    Injectable 5 milliGRAM(s) IntraMuscular once PRN Agitation  LORazepam     Tablet 2 milliGRAM(s) Oral every 6 hours PRN Agitation  LORazepam   Injectable 2 milliGRAM(s) IntraMuscular Once PRN Agitation  traZODone 50 milliGRAM(s) Oral at bedtime PRN insomnia  
MEDICATIONS  (STANDING):  propranolol 20 milliGRAM(s) Oral three times a day    MEDICATIONS  (PRN):  acetaminophen   Tablet .. 650 milliGRAM(s) Oral every 6 hours PRN Mild Pain (1 - 3), Moderate Pain (4 - 6)  diphenhydrAMINE 50 milliGRAM(s) Oral every 6 hours PRN agitation  diphenhydrAMINE   Injectable 50 milliGRAM(s) IntraMuscular once PRN agitation  haloperidol     Tablet 5 milliGRAM(s) Oral every 6 hours PRN agitation  haloperidol    Injectable 5 milliGRAM(s) IntraMuscular once PRN Agitation  LORazepam   Injectable 2 milliGRAM(s) IntraMuscular once PRN Agitation  nicotine  Polacrilex Gum 2 milliGRAM(s) Oral every 2 hours PRN cravings  traZODone 50 milliGRAM(s) Oral at bedtime PRN insomnia  
MEDICATIONS  (STANDING):  clonazePAM  Tablet 0.5 milliGRAM(s) Oral at bedtime  paliperidone ER 3 milliGRAM(s) Oral at bedtime  propranolol 20 milliGRAM(s) Oral <User Schedule>    MEDICATIONS  (PRN):  diphenhydrAMINE 50 milliGRAM(s) Oral every 6 hours PRN agitation  diphenhydrAMINE   Injectable 50 milliGRAM(s) IntraMuscular once PRN agitation  haloperidol     Tablet 5 milliGRAM(s) Oral every 6 hours PRN agitation  haloperidol    Injectable 5 milliGRAM(s) IntraMuscular once PRN Agitation  LORazepam     Tablet 2 milliGRAM(s) Oral every 6 hours PRN Agitation  LORazepam   Injectable 2 milliGRAM(s) IntraMuscular Once PRN Agitation  traZODone 50 milliGRAM(s) Oral at bedtime PRN insomnia  
MEDICATIONS  (STANDING):  clonazePAM  Tablet 0.5 milliGRAM(s) Oral two times a day  diVALproex  milliGRAM(s) Oral at bedtime  paliperidone ER 3 milliGRAM(s) Oral at bedtime    MEDICATIONS  (PRN):  diphenhydrAMINE 50 milliGRAM(s) Oral every 6 hours PRN agitation  diphenhydrAMINE   Injectable 50 milliGRAM(s) IntraMuscular once PRN agitation  haloperidol     Tablet 5 milliGRAM(s) Oral every 6 hours PRN agitation  haloperidol    Injectable 5 milliGRAM(s) IntraMuscular once PRN Agitation  LORazepam     Tablet 2 milliGRAM(s) Oral every 6 hours PRN Agitation  LORazepam   Injectable 2 milliGRAM(s) IntraMuscular Once PRN Agitation  
MEDICATIONS  (STANDING):  clonazePAM  Tablet 0.5 milliGRAM(s) Oral two times a day  paliperidone ER 3 milliGRAM(s) Oral at bedtime    MEDICATIONS  (PRN):  diphenhydrAMINE 50 milliGRAM(s) Oral every 6 hours PRN agitation  diphenhydrAMINE   Injectable 50 milliGRAM(s) IntraMuscular once PRN agitation  haloperidol     Tablet 5 milliGRAM(s) Oral every 6 hours PRN agitation  haloperidol    Injectable 5 milliGRAM(s) IntraMuscular once PRN Agitation  LORazepam     Tablet 2 milliGRAM(s) Oral every 6 hours PRN Agitation  LORazepam   Injectable 2 milliGRAM(s) IntraMuscular Once PRN Agitation  traZODone 50 milliGRAM(s) Oral at bedtime PRN insomnia  
MEDICATIONS  (STANDING):  paliperidone ER 6 milliGRAM(s) Oral <User Schedule>  propranolol 20 milliGRAM(s) Oral <User Schedule>    MEDICATIONS  (PRN):  acetaminophen   Tablet .. 650 milliGRAM(s) Oral every 6 hours PRN Mild Pain (1 - 3), Moderate Pain (4 - 6)  diphenhydrAMINE 50 milliGRAM(s) Oral every 6 hours PRN agitation  diphenhydrAMINE   Injectable 50 milliGRAM(s) IntraMuscular once PRN agitation  haloperidol     Tablet 5 milliGRAM(s) Oral every 6 hours PRN agitation  haloperidol    Injectable 5 milliGRAM(s) IntraMuscular once PRN Agitation  LORazepam     Tablet 2 milliGRAM(s) Oral every 6 hours PRN Agitation  LORazepam   Injectable 2 milliGRAM(s) IntraMuscular Once PRN Agitation  traZODone 50 milliGRAM(s) Oral at bedtime PRN insomnia  
MEDICATIONS  (STANDING):  paliperidone ER 6 milliGRAM(s) Oral <User Schedule>  propranolol 20 milliGRAM(s) Oral <User Schedule>    MEDICATIONS  (PRN):  acetaminophen   Tablet .. 650 milliGRAM(s) Oral every 6 hours PRN Mild Pain (1 - 3), Moderate Pain (4 - 6)  diphenhydrAMINE 50 milliGRAM(s) Oral every 6 hours PRN agitation  diphenhydrAMINE   Injectable 50 milliGRAM(s) IntraMuscular once PRN agitation  haloperidol     Tablet 5 milliGRAM(s) Oral every 6 hours PRN agitation  haloperidol    Injectable 5 milliGRAM(s) IntraMuscular once PRN Agitation  LORazepam     Tablet 2 milliGRAM(s) Oral every 6 hours PRN Agitation  LORazepam   Injectable 2 milliGRAM(s) IntraMuscular Once PRN Agitation  nicotine  Polacrilex Gum 2 milliGRAM(s) Oral every 2 hours PRN cravings  traZODone 50 milliGRAM(s) Oral at bedtime PRN insomnia  
MEDICATIONS  (STANDING):  propranolol 20 milliGRAM(s) Oral three times a day    MEDICATIONS  (PRN):  acetaminophen   Tablet .. 650 milliGRAM(s) Oral every 6 hours PRN Mild Pain (1 - 3), Moderate Pain (4 - 6)  diphenhydrAMINE 50 milliGRAM(s) Oral every 6 hours PRN agitation  diphenhydrAMINE   Injectable 50 milliGRAM(s) IntraMuscular once PRN agitation  haloperidol     Tablet 5 milliGRAM(s) Oral every 6 hours PRN agitation  haloperidol    Injectable 5 milliGRAM(s) IntraMuscular once PRN Agitation  LORazepam   Injectable 2 milliGRAM(s) IntraMuscular once PRN Agitation  nicotine  Polacrilex Gum 2 milliGRAM(s) Oral every 2 hours PRN cravings  traZODone 50 milliGRAM(s) Oral at bedtime PRN insomnia  
MEDICATIONS  (STANDING):  paliperidone ER 6 milliGRAM(s) Oral <User Schedule>  propranolol 20 milliGRAM(s) Oral <User Schedule>  propranolol 10 milliGRAM(s) Oral <User Schedule>    MEDICATIONS  (PRN):  acetaminophen   Tablet .. 650 milliGRAM(s) Oral every 6 hours PRN Mild Pain (1 - 3), Moderate Pain (4 - 6)  diphenhydrAMINE 50 milliGRAM(s) Oral every 6 hours PRN agitation  diphenhydrAMINE   Injectable 50 milliGRAM(s) IntraMuscular once PRN agitation  haloperidol     Tablet 5 milliGRAM(s) Oral every 6 hours PRN agitation  haloperidol    Injectable 5 milliGRAM(s) IntraMuscular once PRN Agitation  LORazepam   Injectable 2 milliGRAM(s) IntraMuscular once PRN Agitation  nicotine  Polacrilex Gum 2 milliGRAM(s) Oral every 2 hours PRN cravings  traZODone 50 milliGRAM(s) Oral at bedtime PRN insomnia

## 2021-09-03 NOTE — BH INPATIENT PSYCHIATRY PROGRESS NOTE - NSCGIIMPROVESX_PSY_ALL_CORE
2 = Much improved - notably better with signficant reduction of symptoms; increase in the level of functioning but some symptoms remain
3 = Minimally improved - slightly better with little or no clinically meaningful reduction of symptoms.  Represents very little change in basic clinical status, level of care, or functional capacity.
2 = Much improved - notably better with signficant reduction of symptoms; increase in the level of functioning but some symptoms remain
3 = Minimally improved - slightly better with little or no clinically meaningful reduction of symptoms.  Represents very little change in basic clinical status, level of care, or functional capacity.
2 = Much improved - notably better with signficant reduction of symptoms; increase in the level of functioning but some symptoms remain
3 = Minimally improved - slightly better with little or no clinically meaningful reduction of symptoms.  Represents very little change in basic clinical status, level of care, or functional capacity.
4 = No change - symptoms remain essentially unchanged
3 = Minimally improved - slightly better with little or no clinically meaningful reduction of symptoms.  Represents very little change in basic clinical status, level of care, or functional capacity.
3 = Minimally improved - slightly better with little or no clinically meaningful reduction of symptoms.  Represents very little change in basic clinical status, level of care, or functional capacity.
4 = No change - symptoms remain essentially unchanged

## 2021-09-03 NOTE — BH INPATIENT PSYCHIATRY PROGRESS NOTE - NSTXVIOLNTDATEEST_PSY_ALL_CORE
15-Aug-2021

## 2021-09-03 NOTE — BH INPATIENT PSYCHIATRY PROGRESS NOTE - NSTXPROBVIOLNT_PSY_ALL_CORE
VIOLENT/AGGRESSIVE BEHAVIOR

## 2021-09-03 NOTE — BH INPATIENT PSYCHIATRY PROGRESS NOTE - PRN MEDS
MEDICATIONS  (PRN):  diphenhydrAMINE 50 milliGRAM(s) Oral every 6 hours PRN agitation  diphenhydrAMINE   Injectable 50 milliGRAM(s) IntraMuscular once PRN agitation  haloperidol     Tablet 5 milliGRAM(s) Oral every 6 hours PRN agitation  haloperidol    Injectable 5 milliGRAM(s) IntraMuscular once PRN Agitation  LORazepam     Tablet 2 milliGRAM(s) Oral every 6 hours PRN Agitation  LORazepam   Injectable 2 milliGRAM(s) IntraMuscular Once PRN Agitation  traZODone 50 milliGRAM(s) Oral at bedtime PRN insomnia  
MEDICATIONS  (PRN):  acetaminophen   Tablet .. 650 milliGRAM(s) Oral every 6 hours PRN Mild Pain (1 - 3), Moderate Pain (4 - 6)  diphenhydrAMINE 50 milliGRAM(s) Oral every 6 hours PRN agitation  diphenhydrAMINE   Injectable 50 milliGRAM(s) IntraMuscular once PRN agitation  haloperidol     Tablet 5 milliGRAM(s) Oral every 6 hours PRN agitation  haloperidol    Injectable 5 milliGRAM(s) IntraMuscular once PRN Agitation  LORazepam     Tablet 2 milliGRAM(s) Oral every 6 hours PRN Agitation  LORazepam   Injectable 2 milliGRAM(s) IntraMuscular Once PRN Agitation  traZODone 50 milliGRAM(s) Oral at bedtime PRN insomnia  
MEDICATIONS  (PRN):  acetaminophen   Tablet .. 650 milliGRAM(s) Oral every 6 hours PRN Mild Pain (1 - 3), Moderate Pain (4 - 6)  diphenhydrAMINE 50 milliGRAM(s) Oral every 6 hours PRN agitation  diphenhydrAMINE   Injectable 50 milliGRAM(s) IntraMuscular once PRN agitation  haloperidol     Tablet 5 milliGRAM(s) Oral every 6 hours PRN agitation  haloperidol    Injectable 5 milliGRAM(s) IntraMuscular once PRN Agitation  LORazepam   Injectable 2 milliGRAM(s) IntraMuscular once PRN Agitation  nicotine  Polacrilex Gum 2 milliGRAM(s) Oral every 2 hours PRN cravings  traZODone 50 milliGRAM(s) Oral at bedtime PRN insomnia  
MEDICATIONS  (PRN):  diphenhydrAMINE 50 milliGRAM(s) Oral every 6 hours PRN agitation  diphenhydrAMINE   Injectable 50 milliGRAM(s) IntraMuscular once PRN agitation  haloperidol     Tablet 5 milliGRAM(s) Oral every 6 hours PRN agitation  haloperidol    Injectable 5 milliGRAM(s) IntraMuscular once PRN Agitation  LORazepam     Tablet 2 milliGRAM(s) Oral every 6 hours PRN Agitation  LORazepam   Injectable 2 milliGRAM(s) IntraMuscular Once PRN Agitation  traZODone 50 milliGRAM(s) Oral at bedtime PRN insomnia  
MEDICATIONS  (PRN):  acetaminophen   Tablet .. 650 milliGRAM(s) Oral every 6 hours PRN Mild Pain (1 - 3), Moderate Pain (4 - 6)  diphenhydrAMINE 50 milliGRAM(s) Oral every 6 hours PRN agitation  diphenhydrAMINE   Injectable 50 milliGRAM(s) IntraMuscular once PRN agitation  haloperidol     Tablet 5 milliGRAM(s) Oral every 6 hours PRN agitation  haloperidol    Injectable 5 milliGRAM(s) IntraMuscular once PRN Agitation  LORazepam   Injectable 2 milliGRAM(s) IntraMuscular once PRN Agitation  nicotine  Polacrilex Gum 2 milliGRAM(s) Oral every 2 hours PRN cravings  traZODone 50 milliGRAM(s) Oral at bedtime PRN insomnia  
MEDICATIONS  (PRN):  diphenhydrAMINE 50 milliGRAM(s) Oral every 6 hours PRN agitation  diphenhydrAMINE   Injectable 50 milliGRAM(s) IntraMuscular once PRN agitation  haloperidol     Tablet 5 milliGRAM(s) Oral every 6 hours PRN agitation  haloperidol    Injectable 5 milliGRAM(s) IntraMuscular once PRN Agitation  LORazepam     Tablet 2 milliGRAM(s) Oral every 6 hours PRN Agitation  LORazepam   Injectable 2 milliGRAM(s) IntraMuscular Once PRN Agitation  
MEDICATIONS  (PRN):  acetaminophen   Tablet .. 650 milliGRAM(s) Oral every 6 hours PRN Mild Pain (1 - 3), Moderate Pain (4 - 6)  diphenhydrAMINE 50 milliGRAM(s) Oral every 6 hours PRN agitation  diphenhydrAMINE   Injectable 50 milliGRAM(s) IntraMuscular once PRN agitation  haloperidol     Tablet 5 milliGRAM(s) Oral every 6 hours PRN agitation  haloperidol    Injectable 5 milliGRAM(s) IntraMuscular once PRN Agitation  LORazepam   Injectable 2 milliGRAM(s) IntraMuscular once PRN Agitation  nicotine  Polacrilex Gum 2 milliGRAM(s) Oral every 2 hours PRN cravings  traZODone 50 milliGRAM(s) Oral at bedtime PRN insomnia  
MEDICATIONS  (PRN):  diphenhydrAMINE 50 milliGRAM(s) Oral every 6 hours PRN agitation  diphenhydrAMINE   Injectable 50 milliGRAM(s) IntraMuscular once PRN agitation  haloperidol     Tablet 5 milliGRAM(s) Oral every 6 hours PRN agitation  haloperidol    Injectable 5 milliGRAM(s) IntraMuscular once PRN Agitation  LORazepam     Tablet 2 milliGRAM(s) Oral every 6 hours PRN Agitation  LORazepam   Injectable 2 milliGRAM(s) IntraMuscular Once PRN Agitation  traZODone 50 milliGRAM(s) Oral at bedtime PRN insomnia  
MEDICATIONS  (PRN):  diphenhydrAMINE 50 milliGRAM(s) Oral every 6 hours PRN agitation  diphenhydrAMINE   Injectable 50 milliGRAM(s) IntraMuscular once PRN agitation  haloperidol     Tablet 5 milliGRAM(s) Oral every 6 hours PRN agitation  haloperidol    Injectable 5 milliGRAM(s) IntraMuscular once PRN Agitation  LORazepam     Tablet 2 milliGRAM(s) Oral every 6 hours PRN Agitation  LORazepam   Injectable 2 milliGRAM(s) IntraMuscular Once PRN Agitation  traZODone 50 milliGRAM(s) Oral at bedtime PRN insomnia  
MEDICATIONS  (PRN):  acetaminophen   Tablet .. 650 milliGRAM(s) Oral every 6 hours PRN Mild Pain (1 - 3), Moderate Pain (4 - 6)  diphenhydrAMINE 50 milliGRAM(s) Oral every 6 hours PRN agitation  diphenhydrAMINE   Injectable 50 milliGRAM(s) IntraMuscular once PRN agitation  haloperidol     Tablet 5 milliGRAM(s) Oral every 6 hours PRN agitation  haloperidol    Injectable 5 milliGRAM(s) IntraMuscular once PRN Agitation  LORazepam   Injectable 2 milliGRAM(s) IntraMuscular once PRN Agitation  nicotine  Polacrilex Gum 2 milliGRAM(s) Oral every 2 hours PRN cravings  traZODone 50 milliGRAM(s) Oral at bedtime PRN insomnia  
MEDICATIONS  (PRN):  acetaminophen   Tablet .. 650 milliGRAM(s) Oral every 6 hours PRN Mild Pain (1 - 3), Moderate Pain (4 - 6)  diphenhydrAMINE 50 milliGRAM(s) Oral every 6 hours PRN agitation  diphenhydrAMINE   Injectable 50 milliGRAM(s) IntraMuscular once PRN agitation  haloperidol     Tablet 5 milliGRAM(s) Oral every 6 hours PRN agitation  haloperidol    Injectable 5 milliGRAM(s) IntraMuscular once PRN Agitation  LORazepam   Injectable 2 milliGRAM(s) IntraMuscular once PRN Agitation  nicotine  Polacrilex Gum 2 milliGRAM(s) Oral every 2 hours PRN cravings  traZODone 50 milliGRAM(s) Oral at bedtime PRN insomnia  
MEDICATIONS  (PRN):  acetaminophen   Tablet .. 650 milliGRAM(s) Oral every 6 hours PRN Mild Pain (1 - 3), Moderate Pain (4 - 6)  diphenhydrAMINE 50 milliGRAM(s) Oral every 6 hours PRN agitation  diphenhydrAMINE   Injectable 50 milliGRAM(s) IntraMuscular once PRN agitation  haloperidol     Tablet 5 milliGRAM(s) Oral every 6 hours PRN agitation  haloperidol    Injectable 5 milliGRAM(s) IntraMuscular once PRN Agitation  LORazepam     Tablet 2 milliGRAM(s) Oral every 6 hours PRN Agitation  LORazepam   Injectable 2 milliGRAM(s) IntraMuscular Once PRN Agitation  nicotine  Polacrilex Gum 2 milliGRAM(s) Oral every 2 hours PRN cravings  traZODone 50 milliGRAM(s) Oral at bedtime PRN insomnia  
MEDICATIONS  (PRN):  acetaminophen   Tablet .. 650 milliGRAM(s) Oral every 6 hours PRN Mild Pain (1 - 3), Moderate Pain (4 - 6)  diphenhydrAMINE 50 milliGRAM(s) Oral every 6 hours PRN agitation  diphenhydrAMINE   Injectable 50 milliGRAM(s) IntraMuscular once PRN agitation  haloperidol     Tablet 5 milliGRAM(s) Oral every 6 hours PRN agitation  haloperidol    Injectable 5 milliGRAM(s) IntraMuscular once PRN Agitation  LORazepam   Injectable 2 milliGRAM(s) IntraMuscular once PRN Agitation  nicotine  Polacrilex Gum 2 milliGRAM(s) Oral every 2 hours PRN cravings  traZODone 50 milliGRAM(s) Oral at bedtime PRN insomnia  
MEDICATIONS  (PRN):  acetaminophen   Tablet .. 650 milliGRAM(s) Oral every 6 hours PRN Mild Pain (1 - 3), Moderate Pain (4 - 6)  diphenhydrAMINE 50 milliGRAM(s) Oral every 6 hours PRN agitation  diphenhydrAMINE   Injectable 50 milliGRAM(s) IntraMuscular once PRN agitation  haloperidol     Tablet 5 milliGRAM(s) Oral every 6 hours PRN agitation  haloperidol    Injectable 5 milliGRAM(s) IntraMuscular once PRN Agitation  LORazepam   Injectable 2 milliGRAM(s) IntraMuscular once PRN Agitation  nicotine  Polacrilex Gum 2 milliGRAM(s) Oral every 2 hours PRN cravings  traZODone 50 milliGRAM(s) Oral at bedtime PRN insomnia  
MEDICATIONS  (PRN):  acetaminophen   Tablet .. 650 milliGRAM(s) Oral every 6 hours PRN Mild Pain (1 - 3), Moderate Pain (4 - 6)  diphenhydrAMINE 50 milliGRAM(s) Oral every 6 hours PRN agitation  diphenhydrAMINE   Injectable 50 milliGRAM(s) IntraMuscular once PRN agitation  haloperidol     Tablet 5 milliGRAM(s) Oral every 6 hours PRN agitation  haloperidol    Injectable 5 milliGRAM(s) IntraMuscular once PRN Agitation  LORazepam     Tablet 2 milliGRAM(s) Oral every 6 hours PRN Agitation  LORazepam   Injectable 2 milliGRAM(s) IntraMuscular Once PRN Agitation  traZODone 50 milliGRAM(s) Oral at bedtime PRN insomnia  
MEDICATIONS  (PRN):  diphenhydrAMINE 50 milliGRAM(s) Oral every 6 hours PRN agitation  diphenhydrAMINE   Injectable 50 milliGRAM(s) IntraMuscular once PRN agitation  haloperidol     Tablet 5 milliGRAM(s) Oral every 6 hours PRN agitation  haloperidol    Injectable 5 milliGRAM(s) IntraMuscular once PRN Agitation  LORazepam     Tablet 2 milliGRAM(s) Oral every 6 hours PRN Agitation  LORazepam   Injectable 2 milliGRAM(s) IntraMuscular Once PRN Agitation  traZODone 50 milliGRAM(s) Oral at bedtime PRN insomnia

## 2021-09-03 NOTE — BH INPATIENT PSYCHIATRY PROGRESS NOTE - NSBHMSEKNOWHOW_PSY_ALL_CORE
Educational attainment

## 2021-09-03 NOTE — BH INPATIENT PSYCHIATRY PROGRESS NOTE - NSTXIMPULSDATETRGT_PSY_ALL_CORE
26-Aug-2021
23-Aug-2021
30-Aug-2021
30-Aug-2021
06-Sep-2021
23-Aug-2021
30-Aug-2021
26-Aug-2021
30-Aug-2021
06-Sep-2021
30-Aug-2021
09-Sep-2021
23-Aug-2021
23-Aug-2021
03-Sep-2021
06-Sep-2021

## 2021-09-03 NOTE — BH INPATIENT PSYCHIATRY PROGRESS NOTE - NSBHMSEAFFQUAL_PSY_A_CORE
Euthymic
Elevated/Irritable
Elevated/Irritable
Euthymic

## 2021-09-03 NOTE — BH INPATIENT PSYCHIATRY PROGRESS NOTE - NSBHINPTBILLING_PSY_ALL_CORE
12766 - Inpatient Moderate Complexity
01943 - Inpatient Moderate Complexity
13990 - Inpatient Moderate Complexity
60215 - Inpatient Moderate Complexity
99158 - Inpatient Low Complexity
36475 - Inpatient Low Complexity
70639 - Inpatient Low Complexity
51136 - Inpatient Low Complexity
80890 - Inpatient Moderate Complexity
48618 - Inpatient Low Complexity
21248 - Inpatient Low Complexity
17105 - Inpatient Moderate Complexity
52268 - Inpatient Moderate Complexity
06133 - Inpatient Moderate Complexity
21021 - Inpatient Moderate Complexity
67816 - Inpatient Moderate Complexity

## 2021-09-03 NOTE — BH INPATIENT PSYCHIATRY PROGRESS NOTE - NSCGISEVERILLNESS_PSY_ALL_CORE
2 = Borderline mentally ill – subtle or suspected pathology
3 = Mildly ill – clearly established symptoms with minimal, if any, distress or difficulty in social and occupational function
4 = Moderately ill – overt symptoms causing noticeable, but modest, functional impairment or distress; symptom level may warrant medication
3 = Mildly ill – clearly established symptoms with minimal, if any, distress or difficulty in social and occupational function
3 = Mildly ill – clearly established symptoms with minimal, if any, distress or difficulty in social and occupational function
4 = Moderately ill – overt symptoms causing noticeable, but modest, functional impairment or distress; symptom level may warrant medication
2 = Borderline mentally ill – subtle or suspected pathology
3 = Mildly ill – clearly established symptoms with minimal, if any, distress or difficulty in social and occupational function
3 = Mildly ill – clearly established symptoms with minimal, if any, distress or difficulty in social and occupational function
6 = Severely ill - disruptive pathology, behavior and function are frequently influenced by symptoms, may require assistance from others
3 = Mildly ill – clearly established symptoms with minimal, if any, distress or difficulty in social and occupational function
6 = Severely ill - disruptive pathology, behavior and function are frequently influenced by symptoms, may require assistance from others
4 = Moderately ill – overt symptoms causing noticeable, but modest, functional impairment or distress; symptom level may warrant medication
4 = Moderately ill – overt symptoms causing noticeable, but modest, functional impairment or distress; symptom level may warrant medication
3 = Mildly ill – clearly established symptoms with minimal, if any, distress or difficulty in social and occupational function
6 = Severely ill - disruptive pathology, behavior and function are frequently influenced by symptoms, may require assistance from others

## 2021-09-03 NOTE — BH INPATIENT PSYCHIATRY PROGRESS NOTE - NSBHMSEBEHAV_PSY_A_CORE
Cooperative
Cooperative
Uncooperative/Hostile
Cooperative
Uncooperative/Hostile
Cooperative

## 2021-09-03 NOTE — BH INPATIENT PSYCHIATRY PROGRESS NOTE - NSTXMEDICGOAL_PSY_ALL_CORE
Be able to describe the benefit of medication/treatment
Take all medications as prescribed
Be able to describe the benefit of medication/treatment
Take all medications as prescribed
Be able to describe the benefit of medication/treatment
Take all medications as prescribed
Be able to describe the benefit of medication/treatment
Be able to describe the benefit of medication/treatment
Take all medications as prescribed
Be able to describe the benefit of medication/treatment
Be able to describe the benefit of medication/treatment
Take all medications as prescribed
Be able to describe the benefit of medication/treatment

## 2021-09-03 NOTE — BH INPATIENT PSYCHIATRY PROGRESS NOTE - NSBHFUPINTERVALCCFT_PSY_A_CORE
"When's my discharge"
"When's my discharge"
"I'm good"
"When's my discharge"
"I'm good"
"I'm good"
"When's my discharge"
"I'm good"
"When's my discharge"
"I'm good"
"When's my discharge"

## 2021-09-03 NOTE — BH INPATIENT PSYCHIATRY PROGRESS NOTE - NSBHPSYCHOLCOGORIENT_PSY_A_CORE
Oriented to time, place, person, situation

## 2021-09-03 NOTE — BH INPATIENT PSYCHIATRY PROGRESS NOTE - NSBHCONTPROVIDER_PSY_ALL_CORE
Yes...
No, attempted...
No, attempted...
No...
No, attempted...
No, attempted...
No...
No...
Yes...
No, attempted...
No, attempted...
Yes...
No...
No, attempted...
Yes...
Yes...

## 2021-09-03 NOTE — BH INPATIENT PSYCHIATRY PROGRESS NOTE - NSTXVIOLNTGOAL_PSY_ALL_CORE
Will identify how anger may be a response to other feelings

## 2021-09-03 NOTE — BH INPATIENT PSYCHIATRY PROGRESS NOTE - NSBHCHARTREVIEWVS_PSY_A_CORE FT
Vital Signs Last 24 Hrs  T(C): 36.3 (17 Aug 2021 06:39), Max: 36.5 (16 Aug 2021 20:37)  T(F): 97.3 (17 Aug 2021 06:39), Max: 97.7 (16 Aug 2021 20:37)  HR: 97 (17 Aug 2021 06:39) (97 - 97)  BP: 141/86 (17 Aug 2021 06:39) (141/86 - 141/86)  BP(mean): --  RR: --  SpO2: --
Vital Signs Last 24 Hrs  T(C): 36.4 (24 Aug 2021 08:29), Max: 36.4 (24 Aug 2021 08:29)  T(F): 97.6 (24 Aug 2021 08:29), Max: 97.6 (24 Aug 2021 08:29)  HR: 77 (24 Aug 2021 08:29) (77 - 77)  BP: 129/74 (24 Aug 2021 08:29) (129/74 - 129/74)  BP(mean): --  RR: --  SpO2: --
Vital Signs Last 24 Hrs  T(C): 35.8 (08-25-21 @ 06:29), Max: 36.4 (08-24-21 @ 20:50)  T(F): 96.4 (08-25-21 @ 06:29), Max: 97.5 (08-24-21 @ 20:50)  HR: --  BP: --  BP(mean): --  RR: --  SpO2: --    Orthostatic VS  08-25-21 @ 06:29  Lying BP: --/-- HR: --  Sitting BP: 126/67 HR: 84  Standing BP: --/-- HR: --  Site: --  Mode: --  Orthostatic VS  08-23-21 @ 20:38  Lying BP: --/-- HR: --  Sitting BP: 127/84 HR: 83  Standing BP: 129/87 HR: 88  Site: --  Mode: --  
Vital Signs Last 24 Hrs  T(C): 36.4 (19 Aug 2021 08:12), Max: 36.4 (18 Aug 2021 20:49)  T(F): 97.5 (19 Aug 2021 08:12), Max: 97.6 (18 Aug 2021 20:49)  HR: 81 (19 Aug 2021 08:12) (81 - 81)  BP: 122/80 (19 Aug 2021 08:12) (122/80 - 122/80)  BP(mean): --  RR: --  SpO2: --
Vital Signs Last 24 Hrs  T(C): 36.1 (18 Aug 2021 08:10), Max: 36.3 (17 Aug 2021 20:51)  T(F): 97 (18 Aug 2021 08:10), Max: 97.3 (17 Aug 2021 20:51)  HR: 99 (18 Aug 2021 08:10) (99 - 99)  BP: 124/68 (18 Aug 2021 08:10) (124/68 - 124/68)  BP(mean): --  RR: --  SpO2: --
Vital Signs Last 24 Hrs  T(C): 36.3 (17 Aug 2021 06:39), Max: 36.5 (16 Aug 2021 20:37)  T(F): 97.3 (17 Aug 2021 06:39), Max: 97.7 (16 Aug 2021 20:37)  HR: 97 (17 Aug 2021 06:39) (97 - 97)  BP: 141/86 (17 Aug 2021 06:39) (141/86 - 141/86)  BP(mean): --  RR: --  SpO2: --
Vital Signs Last 24 Hrs  T(C): 36.3 (08-27-21 @ 06:21), Max: 36.3 (08-27-21 @ 06:21)  T(F): 97.4 (08-27-21 @ 06:21), Max: 97.4 (08-27-21 @ 06:21)  HR: 79 (08-27-21 @ 06:21) (79 - 89)  BP: 132/75 (08-27-21 @ 06:21) (132/75 - 137/77)  BP(mean): --  RR: --  SpO2: --    Orthostatic VS  08-25-21 @ 21:09  Lying BP: --/-- HR: --  Sitting BP: 137/88 HR: 97  Standing BP: --/-- HR: --  Site: --  Mode: --  
Vital Signs Last 24 Hrs  T(C): 36.6 (08-31-21 @ 06:38), Max: 36.6 (08-31-21 @ 06:38)  T(F): 97.8 (08-31-21 @ 06:38), Max: 97.8 (08-31-21 @ 06:38)  HR: 74 (08-30-21 @ 20:26) (74 - 74)  BP: 140/84 (08-30-21 @ 20:26) (140/84 - 140/84)  BP(mean): --  RR: --  SpO2: --    Orthostatic VS  08-31-21 @ 06:38  Lying BP: --/-- HR: --  Sitting BP: 122/53 HR: 63  Standing BP: --/-- HR: --  Site: --  Mode: --  
Vital Signs Last 24 Hrs  T(C): 36.7 (09-02-21 @ 21:03), Max: 36.7 (09-02-21 @ 21:03)  T(F): 98.1 (09-02-21 @ 21:03), Max: 98.1 (09-02-21 @ 21:03)  HR: 82 (09-02-21 @ 22:00) (82 - 82)  BP: --  BP(mean): --  RR: --  SpO2: --    
Vital Signs Last 24 Hrs  T(C): 36.5 (08-26-21 @ 07:44), Max: 36.6 (08-25-21 @ 21:09)  T(F): 97.7 (08-26-21 @ 07:44), Max: 97.8 (08-25-21 @ 21:09)  HR: 83 (08-26-21 @ 07:44) (83 - 83)  BP: 126/94 (08-26-21 @ 07:44) (126/94 - 126/94)  BP(mean): --  RR: --  SpO2: --    Orthostatic VS  08-25-21 @ 21:09  Lying BP: --/-- HR: --  Sitting BP: 137/88 HR: 97  Standing BP: --/-- HR: --  Site: --  Mode: --  Orthostatic VS  08-25-21 @ 06:29  Lying BP: --/-- HR: --  Sitting BP: 126/67 HR: 84  Standing BP: --/-- HR: --  Site: --  Mode: --  
Vital Signs Last 24 Hrs  T(C): 35.8 (23 Aug 2021 06:29), Max: 36.6 (22 Aug 2021 20:39)  T(F): 96.4 (23 Aug 2021 06:29), Max: 97.9 (22 Aug 2021 20:39)  HR: 73 (23 Aug 2021 06:29) (73 - 73)  BP: 125/82 (23 Aug 2021 06:29) (125/82 - 125/82)  BP(mean): --  RR: --  SpO2: --
Vital Signs Last 24 Hrs  T(C): 36.7 (08-30-21 @ 06:47), Max: 36.7 (08-29-21 @ 20:57)  T(F): 98 (08-30-21 @ 06:47), Max: 98 (08-29-21 @ 20:57)  HR: 79 (08-30-21 @ 06:47) (79 - 79)  BP: 108/65 (08-30-21 @ 06:47) (108/65 - 108/65)  BP(mean): --  RR: --  SpO2: --    
Vital Signs Last 24 Hrs  T(C): 36.3 (09-02-21 @ 07:59), Max: 36.5 (09-01-21 @ 19:48)  T(F): 97.4 (09-02-21 @ 07:59), Max: 97.7 (09-01-21 @ 19:48)  HR: 60 (09-02-21 @ 07:59) (60 - 60)  BP: 122/65 (09-02-21 @ 07:59) (122/65 - 122/65)  BP(mean): --  RR: --  SpO2: --    
Vital Signs Last 24 Hrs  T(C): 36.7 (16 Aug 2021 07:46), Max: 36.7 (16 Aug 2021 07:46)  T(F): 98 (16 Aug 2021 07:46), Max: 98 (16 Aug 2021 07:46)  HR: 100 (16 Aug 2021 07:46) (100 - 100)  BP: 127/77 (16 Aug 2021 07:46) (127/77 - 127/77)  BP(mean): --  RR: --  SpO2: --
Vital Signs Last 24 Hrs  T(C): 36 (09-01-21 @ 06:20), Max: 37.1 (08-31-21 @ 20:38)  T(F): 96.8 (09-01-21 @ 06:20), Max: 98.8 (08-31-21 @ 20:38)  HR: 82 (09-01-21 @ 06:20) (82 - 82)  BP: 125/68 (09-01-21 @ 06:20) (125/68 - 125/68)  BP(mean): --  RR: --  SpO2: --    Orthostatic VS  08-31-21 @ 06:38  Lying BP: --/-- HR: --  Sitting BP: 122/53 HR: 63  Standing BP: --/-- HR: --  Site: --  Mode: --  
Vital Signs Last 24 Hrs  T(C): 37.1 (20 Aug 2021 06:14), Max: 37.1 (20 Aug 2021 06:14)  T(F): 98.7 (20 Aug 2021 06:14), Max: 98.7 (20 Aug 2021 06:14)  HR: --  BP: --  BP(mean): --  RR: --  SpO2: --

## 2021-09-10 PROCEDURE — 90832 PSYTX W PT 30 MINUTES: CPT | Mod: 95

## 2021-09-16 PROCEDURE — 90832 PSYTX W PT 30 MINUTES: CPT | Mod: 95

## 2021-09-17 PROCEDURE — 99214 OFFICE O/P EST MOD 30 MIN: CPT | Mod: 95

## 2021-09-24 PROCEDURE — 90834 PSYTX W PT 45 MINUTES: CPT | Mod: 95

## 2021-10-01 PROCEDURE — G9005: CPT

## 2021-10-01 PROCEDURE — 90834 PSYTX W PT 45 MINUTES: CPT | Mod: 95

## 2021-10-01 PROCEDURE — 99214 OFFICE O/P EST MOD 30 MIN: CPT | Mod: 95

## 2021-10-15 PROCEDURE — 90832 PSYTX W PT 30 MINUTES: CPT | Mod: 95

## 2021-10-22 ENCOUNTER — INPATIENT (INPATIENT)
Facility: HOSPITAL | Age: 21
LOS: 31 days | Discharge: HOME | End: 2021-11-23
Attending: STUDENT IN AN ORGANIZED HEALTH CARE EDUCATION/TRAINING PROGRAM | Admitting: STUDENT IN AN ORGANIZED HEALTH CARE EDUCATION/TRAINING PROGRAM
Payer: MEDICAID

## 2021-10-22 VITALS
DIASTOLIC BLOOD PRESSURE: 82 MMHG | HEART RATE: 78 BPM | OXYGEN SATURATION: 100 % | TEMPERATURE: 98 F | RESPIRATION RATE: 18 BRPM | SYSTOLIC BLOOD PRESSURE: 167 MMHG

## 2021-10-22 DIAGNOSIS — F20.0 PARANOID SCHIZOPHRENIA: ICD-10-CM

## 2021-10-22 LAB
ALBUMIN SERPL ELPH-MCNC: 5.6 G/DL — HIGH (ref 3.3–5)
ALP SERPL-CCNC: 61 U/L — SIGNIFICANT CHANGE UP (ref 40–120)
ALT FLD-CCNC: 70 U/L — HIGH (ref 4–41)
ANION GAP SERPL CALC-SCNC: 13 MMOL/L — SIGNIFICANT CHANGE UP (ref 7–14)
APAP SERPL-MCNC: <5 UG/ML — LOW (ref 15–25)
AST SERPL-CCNC: 18 U/L — SIGNIFICANT CHANGE UP (ref 4–40)
BASOPHILS # BLD AUTO: 0.03 K/UL — SIGNIFICANT CHANGE UP (ref 0–0.2)
BASOPHILS NFR BLD AUTO: 0.4 % — SIGNIFICANT CHANGE UP (ref 0–2)
BILIRUB SERPL-MCNC: 0.4 MG/DL — SIGNIFICANT CHANGE UP (ref 0.2–1.2)
BUN SERPL-MCNC: 16 MG/DL — SIGNIFICANT CHANGE UP (ref 7–23)
CALCIUM SERPL-MCNC: 9.9 MG/DL — SIGNIFICANT CHANGE UP (ref 8.4–10.5)
CHLORIDE SERPL-SCNC: 100 MMOL/L — SIGNIFICANT CHANGE UP (ref 98–107)
CO2 SERPL-SCNC: 23 MMOL/L — SIGNIFICANT CHANGE UP (ref 22–31)
CREAT SERPL-MCNC: 1.07 MG/DL — SIGNIFICANT CHANGE UP (ref 0.5–1.3)
EOSINOPHIL # BLD AUTO: 0.05 K/UL — SIGNIFICANT CHANGE UP (ref 0–0.5)
EOSINOPHIL NFR BLD AUTO: 0.6 % — SIGNIFICANT CHANGE UP (ref 0–6)
ETHANOL SERPL-MCNC: <10 MG/DL — SIGNIFICANT CHANGE UP
GLUCOSE SERPL-MCNC: 97 MG/DL — SIGNIFICANT CHANGE UP (ref 70–99)
HCT VFR BLD CALC: 47.5 % — SIGNIFICANT CHANGE UP (ref 39–50)
HGB BLD-MCNC: 16.5 G/DL — SIGNIFICANT CHANGE UP (ref 13–17)
IANC: 5.22 K/UL — SIGNIFICANT CHANGE UP (ref 1.5–8.5)
IMM GRANULOCYTES NFR BLD AUTO: 0.3 % — SIGNIFICANT CHANGE UP (ref 0–1.5)
LYMPHOCYTES # BLD AUTO: 1.93 K/UL — SIGNIFICANT CHANGE UP (ref 1–3.3)
LYMPHOCYTES # BLD AUTO: 24.2 % — SIGNIFICANT CHANGE UP (ref 13–44)
MCHC RBC-ENTMCNC: 28.4 PG — SIGNIFICANT CHANGE UP (ref 27–34)
MCHC RBC-ENTMCNC: 34.7 GM/DL — SIGNIFICANT CHANGE UP (ref 32–36)
MCV RBC AUTO: 81.8 FL — SIGNIFICANT CHANGE UP (ref 80–100)
MONOCYTES # BLD AUTO: 0.72 K/UL — SIGNIFICANT CHANGE UP (ref 0–0.9)
MONOCYTES NFR BLD AUTO: 9 % — SIGNIFICANT CHANGE UP (ref 2–14)
NEUTROPHILS # BLD AUTO: 5.22 K/UL — SIGNIFICANT CHANGE UP (ref 1.8–7.4)
NEUTROPHILS NFR BLD AUTO: 65.5 % — SIGNIFICANT CHANGE UP (ref 43–77)
NRBC # BLD: 0 /100 WBCS — SIGNIFICANT CHANGE UP
NRBC # FLD: 0 K/UL — SIGNIFICANT CHANGE UP
PLATELET # BLD AUTO: 176 K/UL — SIGNIFICANT CHANGE UP (ref 150–400)
POTASSIUM SERPL-MCNC: 4.3 MMOL/L — SIGNIFICANT CHANGE UP (ref 3.5–5.3)
POTASSIUM SERPL-SCNC: 4.3 MMOL/L — SIGNIFICANT CHANGE UP (ref 3.5–5.3)
PROT SERPL-MCNC: 8 G/DL — SIGNIFICANT CHANGE UP (ref 6–8.3)
RBC # BLD: 5.81 M/UL — HIGH (ref 4.2–5.8)
RBC # FLD: 12.3 % — SIGNIFICANT CHANGE UP (ref 10.3–14.5)
SALICYLATES SERPL-MCNC: <0.3 MG/DL — LOW (ref 15–30)
SARS-COV-2 RNA SPEC QL NAA+PROBE: SIGNIFICANT CHANGE UP
SODIUM SERPL-SCNC: 136 MMOL/L — SIGNIFICANT CHANGE UP (ref 135–145)
WBC # BLD: 7.97 K/UL — SIGNIFICANT CHANGE UP (ref 3.8–10.5)
WBC # FLD AUTO: 7.97 K/UL — SIGNIFICANT CHANGE UP (ref 3.8–10.5)

## 2021-10-22 PROCEDURE — 93010 ELECTROCARDIOGRAM REPORT: CPT

## 2021-10-22 PROCEDURE — 99285 EMERGENCY DEPT VISIT HI MDM: CPT

## 2021-10-22 PROCEDURE — 99285 EMERGENCY DEPT VISIT HI MDM: CPT | Mod: 25

## 2021-10-22 RX ORDER — HALOPERIDOL DECANOATE 100 MG/ML
5 INJECTION INTRAMUSCULAR ONCE
Refills: 0 | Status: COMPLETED | OUTPATIENT
Start: 2021-10-22 | End: 2021-10-22

## 2021-10-22 RX ORDER — DIPHENHYDRAMINE HCL 50 MG
50 CAPSULE ORAL ONCE
Refills: 0 | Status: DISCONTINUED | OUTPATIENT
Start: 2021-10-22 | End: 2021-11-05

## 2021-10-22 RX ORDER — HALOPERIDOL DECANOATE 100 MG/ML
5 INJECTION INTRAMUSCULAR ONCE
Refills: 0 | Status: DISCONTINUED | OUTPATIENT
Start: 2021-10-22 | End: 2021-10-24

## 2021-10-22 RX ORDER — DIPHENHYDRAMINE HCL 50 MG
50 CAPSULE ORAL EVERY 6 HOURS
Refills: 0 | Status: DISCONTINUED | OUTPATIENT
Start: 2021-10-22 | End: 2021-11-05

## 2021-10-22 RX ORDER — HALOPERIDOL DECANOATE 100 MG/ML
5 INJECTION INTRAMUSCULAR EVERY 6 HOURS
Refills: 0 | Status: DISCONTINUED | OUTPATIENT
Start: 2021-10-22 | End: 2021-10-29

## 2021-10-22 RX ADMIN — Medication 2 MILLIGRAM(S): at 13:12

## 2021-10-22 RX ADMIN — HALOPERIDOL DECANOATE 5 MILLIGRAM(S): 100 INJECTION INTRAMUSCULAR at 13:11

## 2021-10-22 NOTE — ED PROVIDER NOTE - OBJECTIVE STATEMENT
20 year-old-male with past medical history of schizophrenia   presents to the emergency department with psychotic features  has not been taking meds, unsure what psych meds he is taking  as per collateral has been locked in room  not eating or sleeping for the past 3 days  he does  not want to reveal his thought contents  because he thinks he will get into trouble  no si/hi  admits to marijuana useage

## 2021-10-22 NOTE — ED PROVIDER NOTE - PHYSICAL EXAMINATION
CONSTITUTIONAL: Non-toxic, non-diaphoretic, in no apparent distress  HEAD: Normocephalic; atruamatic  EYES: EOM intact   ENMT: External appears normal; normal oropharynx, moist  NECK: grossly normal active ROM,  CARD: No cyanosis, good peripheral perfusion,  RESP: Normal chest excursion with respiration; no increased work of breathing  ABD: non-distended   EXT: moving all extremities, no gross disfigurement or asymmetry,  SKIN: Warm, dry, no rash  NEURO:  moving all extremities, no facial droop, no dysarthria      cn2-12 intact  5/5 all extremities   internally preoccupied, paranoid delusions present, no si/hi

## 2021-10-22 NOTE — ED BEHAVIORAL HEALTH NOTE - BEHAVIORAL HEALTH NOTE
COVID Exposure Screen  1.        *Have you had a COVID-19 test in the last 90 days?  (X) Yes   (  ) No   (  ) Unknown- Reason: _____  IF YES PROCEED TO QUESTION #2. IF NO OR UNKNOWN, PLEASE SKIP TO QUESTION #3.  2.        Date of test(s) and result(s): 8/14/21 NEGATIVE  3.        *Have you tested positive for COVID-19 antibodies? (X) Yes   (  ) No   (  ) Unknown- Reason: _____  IF YES PROCEED TO QUESTION #4. IF NO or UNKNOWN, PLEASE SKIP TO QUESTION #5.  4.        Date of positive antibody test: 8/14/21 POSITIVE  5.        *Have you received 2 doses of the COVID-19 vaccine? (X) Yes   (  ) No   (  ) Unknown- Reason: _____  IF YES PROCEED TO QUESTION #6. IF NO or UNKNOWN, PLEASE SKIP TO QUESTION #7.  6.        Date of second dose: 1 DOSE [facility], APRIL 2021  7.        *In the past 10 days, have you been around anyone with a positive COVID-19 test?* (  ) Yes   (X) No   (  ) Unknown- Reason: ____  IF YES PROCEED TO QUESTION #8. IF NO or UNKNOWN, PLEASE SKIP TO QUESTION #13.  8.        Were you within 6 feet of them for at least 15 minutes? (  ) Yes   (  ) No   (  ) Unknown- Reason: _____  9.        Have you provided care for them? (  ) Yes   (  ) No   (  ) Unknown- Reason: ______  10.     Have you had direct physical contact with them (touched, hugged, or kissed them)? (  ) Yes   (  ) No    (  ) Unknown- Reason: _____  11.     Have you shared eating or drinking utensils with them? (  ) Yes   (  ) No    (  ) Unknown- Reason: ____  12.     Have they sneezed, coughed, or somehow gotten respiratory droplets on you? (  ) Yes   (  ) No    (  ) Unknown- Reason: ______  13.     *Have you been out of [place] within the past 10 days?* (  ) Yes   (X) No   (  ) Unknown- Reason: _____  IF YES PLEASE ANSWER THE FOLLOWING QUESTIONS:  14.     Which state/country have you been to? ______  15.     Were you there over 24 hours? (  ) Yes   (  ) No    (  ) Unknown- Reason: ______  16.     Date of return to [facility]: ______

## 2021-10-22 NOTE — ED BEHAVIORAL HEALTH ASSESSMENT NOTE - SUBSTANCE ISSUES AND PLAN (INCLUDE STANDING AND PRN MEDICATION)
none
[FreeTextEntry1] : Patient stopped by this afternoon to see if the results of her skin biopsies performed by Dr Baez were available.  I told the the patient that I had just received them.  The two skin biopsies  showed invasive ductal carcinoma at both sites.  Receptor studies still pending.  \par

## 2021-10-22 NOTE — ED BEHAVIORAL HEALTH NOTE - BEHAVIORAL HEALTH NOTE
Worker called patient’s mother Ron ([phone]) for collateral information. All information is as per patient mother:    Patient is a 20-year-old male, domiciled with mother, father, and siblings, with a dx of schizophrenia and paranoia, with a last hospitalization at [facility] 1s from 8/14-9/16, BIBems activated by mother for irritable behavior. Mother states that the patient has not been doing well since last Saturday. She states that the patient was doing well when he was discharged from [facility]. She states that the patient has not been consistent with his medications. She states that the patient was taking his hydrochloride once a day and not the prescribed three times a day. She states that the patient stopped taking his propranolol on Monday. Patient told her that he did not need to take the medication anymore. Patient is linked to Dr. Alonzo and does virtual meetings with him. She states that the patient has been staying in his room since last Saturday evening and has only been coming out to drink water. She states that the patient has been pacing back and forth in the nighttime and not sleeping. She states that the patient came out of his room on Monday and was yelling and screaming and his father had to stay home from work to calm him down. She states that the patient’s appetite has decreased since Tuesday. She states that the patient sometimes talks to himself, but she is unsure if he is playing playstation with his headphones. She states that the patient was doing online classes at [email or web address] last semester. She states that the patient has been showering at his baseline. She states that today she asked the patient if she he is going to do his prayers and he yelled that he was not going. She states shortly after the patient came out of the room and told her that he needed to tell her that [place] they first came [place] this country [place]. Mother states that she tried to explain to the patient that this is the standard protocol of the airport. Mother states that the patient then started to scream at her “shut up!” and then told her that she was hiding something from him. Mother states that she became scared and went to call 911. Mother states that the patient then started to pace back and forth. She states that the patient texted her on the phone, “You have five years”. She states that the patient has been vaping. She states that yesterday the patient told her that she needs to calm their pet bird from chirping. She states that the patient was vaccinated with Nir [employer] Nir in April. She states that the patient has no exposure to Covid -19. Mother thinks the patient needs to be on a elanie. Case discussed with psychiatry.     Patient will be admitted to [facility]. Worker informed pt's mother of pending admission to .
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
## 2021-10-22 NOTE — ED BEHAVIORAL HEALTH ASSESSMENT NOTE - RISK ASSESSMENT
The patient is at acutely elevated risk of harm to self and others. In the ED he is acutely agitated, physically aggressive towards staff, making homicidal statements towards his parents and his outpatient psychiatrist. Additional risk factors include hx of schizophrenia, hx of violence/aggression, hx of multiple admissions, male sex, age, medication noncompliance, and poor insight. Protective factors include support from family, connection to outpatient care. Unable to determine Suicide Risk

## 2021-10-22 NOTE — ED PROVIDER NOTE - CLINICAL SUMMARY MEDICAL DECISION MAKING FREE TEXT BOX
will obtain basic screening labs, psych consulted suspect admission for uncontrolled psychosis, pending psychiatry final recommendations.
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
## 2021-10-22 NOTE — ED ADULT TRIAGE NOTE - CHIEF COMPLAINT QUOTE
Patient brought to ER by EMS accompanied by PRAVEEN for behavior health evaluation. As per parents, pt noncompliant with schizophrenia medication.

## 2021-10-22 NOTE — ED BEHAVIORAL HEALTH ASSESSMENT NOTE - DESCRIPTION
Patient hostile, posturing, increasingly agitated. Required IM Haldol/Ativan.     Vital Signs Last 24 Hrs  T(C): 36.7 (22 Oct 2021 11:45), Max: 36.7 (22 Oct 2021 11:45)  T(F): 98 (22 Oct 2021 11:45), Max: 98 (22 Oct 2021 11:45)  HR: 78 (22 Oct 2021 11:45) (78 - 78)  BP: 167/82 (22 Oct 2021 11:45) (167/82 - 167/82)  BP(mean): --  RR: 18 (22 Oct 2021 11:45) (18 - 18)  SpO2: 100% (22 Oct 2021 11:45) (100% - 100%) liver hemangiomas Lives with parents and brother and sister. Dropped out of [facility]. Unemployed. Family came to [place] when patient was 8y.

## 2021-10-22 NOTE — BH PATIENT PROFILE - HOME MEDICATIONS
propranolol 20 mg oral tablet , 1 tab(s) orally 3 times a day  paliperidone 9 mg oral tablet, extended release , 1 tab(s) orally once a day (at bedtime)

## 2021-10-22 NOTE — ED BEHAVIORAL HEALTH ASSESSMENT NOTE - PSYCHIATRIC ISSUES AND PLAN (INCLUDE STANDING AND PRN MEDICATION)
schizophrenia, aggression schizophrenia, aggression -  invega 3mg qhs, propranolol 10mg TID; PRN Ativan/Haldol/Benadryl for agitation

## 2021-10-23 LAB
COVID-19 SPIKE DOMAIN AB INTERP: POSITIVE
COVID-19 SPIKE DOMAIN ANTIBODY RESULT: >250 U/ML — HIGH
SARS-COV-2 IGG+IGM SERPL QL IA: >250 U/ML — HIGH
SARS-COV-2 IGG+IGM SERPL QL IA: POSITIVE

## 2021-10-23 PROCEDURE — 99222 1ST HOSP IP/OBS MODERATE 55: CPT

## 2021-10-23 RX ORDER — OLANZAPINE 15 MG/1
10 TABLET, FILM COATED ORAL AT BEDTIME
Refills: 0 | Status: DISCONTINUED | OUTPATIENT
Start: 2021-10-23 | End: 2021-10-26

## 2021-10-23 RX ORDER — OLANZAPINE 15 MG/1
5 TABLET, FILM COATED ORAL DAILY
Refills: 0 | Status: DISCONTINUED | OUTPATIENT
Start: 2021-10-23 | End: 2021-10-26

## 2021-10-23 RX ORDER — NICOTINE POLACRILEX 2 MG
4 GUM BUCCAL
Refills: 0 | Status: DISCONTINUED | OUTPATIENT
Start: 2021-10-23 | End: 2021-11-23
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
## 2021-10-23 NOTE — PSYCHIATRIC REHAB INITIAL EVALUATION - NSBHALCSUBTREAT_PSY_ALL_CORE
Patient was currently receiving outpatient therapy, and was taking psychotropic medications prior to admission. Patient has hx of non-compliance with medications.

## 2021-10-23 NOTE — PSYCHIATRIC REHAB INITIAL EVALUATION - NSBHPRTOOLBOX_PSY_ALL_CORE
Patient reports he does not have coping skills. However, patient reports he enjoys playing video games.

## 2021-10-23 NOTE — BH INPATIENT PSYCHIATRY ASSESSMENT NOTE - NSBHCHARTREVIEWVS_PSY_A_CORE FT
Vital Signs Last 24 Hrs  T(C): 36.7 (10-23-21 @ 06:46), Max: 36.8 (10-22-21 @ 17:34)  T(F): 98.1 (10-23-21 @ 06:46), Max: 98.3 (10-22-21 @ 17:34)  HR: --  BP: --  BP(mean): --  RR: --  SpO2: --    Orthostatic VS  10-23-21 @ 06:46  Lying BP: --/-- HR: --  Sitting BP: 140/104 HR: 99  Standing BP: 153/93 HR: 119  Site: --  Mode: --  Orthostatic VS  10-22-21 @ 17:34  Lying BP: --/-- HR: --  Sitting BP: 130/90 HR: 76  Standing BP: 113/70 HR: 68  Site: --  Mode: --   Vital Signs Last 24 Hrs  T(C): 36.7 (10-23-21 @ 06:46), Max: 36.7 (10-23-21 @ 06:46)  T(F): 98.1 (10-23-21 @ 06:46), Max: 98.1 (10-23-21 @ 06:46)  HR: --  BP: --  BP(mean): --  RR: --  SpO2: --    Orthostatic VS  10-23-21 @ 06:46  Lying BP: --/-- HR: --  Sitting BP: 140/104 HR: 99  Standing BP: 153/93 HR: 119  Site: --  Mode: --  Orthostatic VS  10-22-21 @ 17:34  Lying BP: --/-- HR: --  Sitting BP: 130/90 HR: 76  Standing BP: 113/70 HR: 68  Site: --  Mode: --
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
## 2021-10-23 NOTE — BH INPATIENT PSYCHIATRY ASSESSMENT NOTE - DESCRIPTION
Lives with parents and brother and sister. Dropped out of [facility]. Unemployed. Family came to [place] when patient was 8y.

## 2021-10-23 NOTE — BH INPATIENT PSYCHIATRY ASSESSMENT NOTE - MSE UNSTRUCTURED FT
On exam today the patient is irritable and paranoid .    Speech is mildly pressured and rapid.    Thought process: with tangential thoughts.    Thought content: with paranoid delusions about family and providers.  Perception: Denies hallucinations.    Mood: Describes as "OK".  Affect: constricted.    Patient denies active suicidal ideation, intent and plan.    Patient denies active aggressive/homicidal ideation, intent or plan.   Patient is Alert and oriented in all spheres. Patient is cognitively grossly intact.   Insight and judgment are limited. Impulse control is intact at this time.

## 2021-10-23 NOTE — BH INPATIENT PSYCHIATRY ASSESSMENT NOTE - CURRENT MEDICATION
MEDICATIONS  (STANDING):  OLANZapine Disintegrating Tablet 5 milliGRAM(s) Oral daily  OLANZapine Disintegrating Tablet 10 milliGRAM(s) Oral at bedtime  propranolol 10 milliGRAM(s) Oral three times a day    MEDICATIONS  (PRN):  diphenhydrAMINE 50 milliGRAM(s) Oral every 6 hours PRN Extrapyramidal prophylaxis  diphenhydrAMINE Injectable 50 milliGRAM(s) IntraMuscular once PRN Extrapyramidal prophylaxis  haloperidol     Tablet 5 milliGRAM(s) Oral every 6 hours PRN agitation  haloperidol    Injectable 5 milliGRAM(s) IntraMuscular once PRN Agitation  LORazepam     Tablet 2 milliGRAM(s) Oral every 6 hours PRN Agitation  LORazepam   Injectable 2 milliGRAM(s) IntraMuscular Once PRN severe agitation  nicotine  Polacrilex Gum 4 milliGRAM(s) Oral every 2 hours PRN nicotine dependence
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
## 2021-10-23 NOTE — BH INPATIENT PSYCHIATRY ASSESSMENT NOTE - VIOLENCE RISK FACTORS:
Violent ideation/threat/speech/Affective dysregulation/Impulsivity/Lack of insight into violence risk/need for treatment/Noncompliance with treatment/Irritability
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
## 2021-10-23 NOTE — BH INPATIENT PSYCHIATRY ASSESSMENT NOTE - NSBHMETABOLIC_PSY_ALL_CORE_FT
To whom it may concern,    Sonia Key is a patient of my practice that is currently 25 weeks pregnant.  She is finding it difficult for her to gain weight due to the length of her commute and also it being more difficult for her to perform her usual job duties which are causing increased pressure on her abdomen.    I recommend that she not work longer hours and she does at this time so that she is able to return home sooner so she may rest.  These recommendations could change later in the pregnancy.    Thank you very much for accommodating Ms. Key and if you have any questions please contact my office.    Sincerely,        Armando Clement MD F ACOG   BMI:   HbA1c:   Glucose:   BP: 106/66 (10-22-21 @ 15:56) (106/66 - 167/82)  Lipid Panel:

## 2021-10-24 PROCEDURE — 99232 SBSQ HOSP IP/OBS MODERATE 35: CPT

## 2021-10-24 RX ORDER — OLANZAPINE 15 MG/1
10 TABLET, FILM COATED ORAL ONCE
Refills: 0 | Status: DISCONTINUED | OUTPATIENT
Start: 2021-10-24 | End: 2021-11-02

## 2021-10-24 RX ORDER — HALOPERIDOL DECANOATE 100 MG/ML
7.5 INJECTION INTRAMUSCULAR ONCE
Refills: 0 | Status: DISCONTINUED | OUTPATIENT
Start: 2021-10-24 | End: 2021-10-29

## 2021-10-24 RX ORDER — DIPHENHYDRAMINE HCL 50 MG
50 CAPSULE ORAL ONCE
Refills: 0 | Status: COMPLETED | OUTPATIENT
Start: 2021-10-24 | End: 2021-10-24

## 2021-10-24 RX ORDER — HALOPERIDOL DECANOATE 100 MG/ML
5 INJECTION INTRAMUSCULAR ONCE
Refills: 0 | Status: COMPLETED | OUTPATIENT
Start: 2021-10-24 | End: 2021-10-24

## 2021-10-24 RX ADMIN — Medication 50 MILLIGRAM(S): at 21:56

## 2021-10-24 RX ADMIN — HALOPERIDOL DECANOATE 5 MILLIGRAM(S): 100 INJECTION INTRAMUSCULAR at 21:56

## 2021-10-24 RX ADMIN — Medication 50 MILLIGRAM(S): at 07:45

## 2021-10-24 RX ADMIN — Medication 2 MILLIGRAM(S): at 07:35

## 2021-10-24 RX ADMIN — HALOPERIDOL DECANOATE 5 MILLIGRAM(S): 100 INJECTION INTRAMUSCULAR at 07:45

## 2021-10-24 RX ADMIN — Medication 2 MILLIGRAM(S): at 21:56

## 2021-10-24 RX ADMIN — Medication 2 MILLIGRAM(S): at 07:45
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
## 2021-10-24 NOTE — BH INPATIENT PSYCHIATRY PROGRESS NOTE - NSBHCHARTREVIEWVS_PSY_A_CORE FT
Vital Signs Last 24 Hrs  T(C): 37.1 (10-24-21 @ 06:35), Max: 37.1 (10-24-21 @ 06:35)  T(F): 98.7 (10-24-21 @ 06:35), Max: 98.7 (10-24-21 @ 06:35)  HR: 76 (10-24-21 @ 06:35) (64 - 76)  BP: 132/88 (10-24-21 @ 06:35) (131/65 - 132/88)  BP(mean): --  RR: 17 (10-23-21 @ 22:13) (17 - 17)  SpO2: --    Orthostatic VS  10-23-21 @ 06:46  Lying BP: --/-- HR: --  Sitting BP: 140/104 HR: 99  Standing BP: 153/93 HR: 119  Site: --  Mode: --  Orthostatic VS  10-22-21 @ 17:34  Lying BP: --/-- HR: --  Sitting BP: 130/90 HR: 76  Standing BP: 113/70 HR: 68  Site: --  Mode: --
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
## 2021-10-25 PROCEDURE — 73130 X-RAY EXAM OF HAND: CPT | Mod: 26,50

## 2021-10-25 PROCEDURE — 99232 SBSQ HOSP IP/OBS MODERATE 35: CPT

## 2021-10-25 RX ADMIN — Medication 4 MILLIGRAM(S): at 13:47

## 2021-10-25 NOTE — BH INPATIENT PSYCHIATRY PROGRESS NOTE - NSTXPSYCHOGOALOTHER_PSY_ALL_CORE
pt will be less psychotic and not homicidal with a CGI of 2
pt will be less psychotic and not homicidal with a CGI of 2

## 2021-10-25 NOTE — BH INPATIENT PSYCHIATRY PROGRESS NOTE - NSBHCHARTREVIEWVS_PSY_A_CORE FT
Vital Signs Last 24 Hrs  T(C): 36.4 (10-25-21 @ 06:43), Max: 36.4 (10-25-21 @ 06:43)  T(F): 97.6 (10-25-21 @ 06:43), Max: 97.6 (10-25-21 @ 06:43)  HR: 100 (10-25-21 @ 13:29) (94 - 100)  BP: 161/92 (10-25-21 @ 13:29) (133/96 - 161/92)  BP(mean): --  RR: --  SpO2: --
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
## 2021-10-25 NOTE — BH SOCIAL WORK INITIAL PSYCHOSOCIAL EVALUATION - OTHER PAST PSYCHIATRIC HISTORY (INCLUDE DETAILS REGARDING ONSET, COURSE OF ILLNESS, INPATIENT/OUTPATIENT TREATMENT)
Pt is 20 year old male, with history of psychiatric hospitalizations, non-compliance, history of aggression and irritability when off of medication.
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
## 2021-10-25 NOTE — BH SOCIAL WORK INITIAL PSYCHOSOCIAL EVALUATION - NSHIGHRISKBEHFT_PSY_ALL_CORE
Patient history includes aggression at home towards parents, however pt denies recent explosive aggression with family since recent discharge in August 2021.

## 2021-10-26 PROCEDURE — 99232 SBSQ HOSP IP/OBS MODERATE 35: CPT

## 2021-10-26 RX ORDER — HALOPERIDOL DECANOATE 100 MG/ML
7.5 INJECTION INTRAMUSCULAR ONCE
Refills: 0 | Status: COMPLETED | OUTPATIENT
Start: 2021-10-26 | End: 2021-10-26

## 2021-10-26 RX ORDER — RISPERIDONE 4 MG/1
2 TABLET ORAL
Refills: 0 | Status: DISCONTINUED | OUTPATIENT
Start: 2021-10-26 | End: 2021-11-02

## 2021-10-26 RX ORDER — DIPHENHYDRAMINE HCL 50 MG
50 CAPSULE ORAL ONCE
Refills: 0 | Status: COMPLETED | OUTPATIENT
Start: 2021-10-26 | End: 2021-10-26

## 2021-10-26 RX ADMIN — HALOPERIDOL DECANOATE 5 MILLIGRAM(S): 100 INJECTION INTRAMUSCULAR at 07:56

## 2021-10-26 RX ADMIN — Medication 3 MILLIGRAM(S): at 21:00

## 2021-10-26 RX ADMIN — OLANZAPINE 5 MILLIGRAM(S): 15 TABLET, FILM COATED ORAL at 08:07

## 2021-10-26 RX ADMIN — Medication 50 MILLIGRAM(S): at 21:00

## 2021-10-26 RX ADMIN — HALOPERIDOL DECANOATE 7.5 MILLIGRAM(S): 100 INJECTION INTRAMUSCULAR at 21:00

## 2021-10-26 RX ADMIN — Medication 2 MILLIGRAM(S): at 07:56

## 2021-10-26 RX ADMIN — Medication 50 MILLIGRAM(S): at 07:56
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
## 2021-10-26 NOTE — BH INPATIENT PSYCHIATRY PROGRESS NOTE - NSBHCHARTREVIEWVS_PSY_A_CORE FT
Vital Signs Last 24 Hrs  T(C): 36.6 (10-26-21 @ 06:43), Max: 36.6 (10-26-21 @ 06:43)  T(F): 97.8 (10-26-21 @ 06:43), Max: 97.8 (10-26-21 @ 06:43)  HR: 69 (10-26-21 @ 06:43) (69 - 69)  BP: 140/80 (10-26-21 @ 06:43) (140/80 - 140/80)  BP(mean): 69 (10-26-21 @ 06:43) (69 - 69)  RR: --  SpO2: --    Orthostatic VS  10-25-21 @ 20:52  Lying BP: --/-- HR: --  Sitting BP: 147/95 HR: 91  Standing BP: 134/90 HR: 106  Site: --  Mode: --
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
## 2021-10-27 PROCEDURE — 99232 SBSQ HOSP IP/OBS MODERATE 35: CPT

## 2021-10-27 RX ORDER — OLANZAPINE 15 MG/1
10 TABLET, FILM COATED ORAL ONCE
Refills: 0 | Status: COMPLETED | OUTPATIENT
Start: 2021-10-27 | End: 2021-10-27

## 2021-10-27 RX ADMIN — Medication 2 MILLIGRAM(S): at 22:37

## 2021-10-27 RX ADMIN — Medication 50 MILLIGRAM(S): at 22:38

## 2021-10-27 RX ADMIN — Medication 2 MILLIGRAM(S): at 08:49

## 2021-10-27 RX ADMIN — Medication 50 MILLIGRAM(S): at 08:51

## 2021-10-27 RX ADMIN — OLANZAPINE 10 MILLIGRAM(S): 15 TABLET, FILM COATED ORAL at 12:31

## 2021-10-27 NOTE — BH INPATIENT PSYCHIATRY PROGRESS NOTE - NSBHCONSBHPROVCNTCTNOFT_PSY_A_CORE
Talked for Dr Wells and informed him of patient's homicidal ideation and threats

## 2021-10-27 NOTE — BH INPATIENT PSYCHIATRY PROGRESS NOTE - NSBHCHARTREVIEWVS_PSY_A_CORE FT
Vital Signs Last 24 Hrs  T(C): 36.4 (10-27-21 @ 08:04), Max: 36.5 (10-26-21 @ 20:50)  T(F): 97.5 (10-27-21 @ 08:04), Max: 97.7 (10-26-21 @ 20:50)  HR: 81 (10-27-21 @ 08:04) (81 - 96)  BP: 146/81 (10-27-21 @ 08:04) (138/86 - 146/81)  BP(mean): --  RR: --  SpO2: --    Orthostatic VS  10-25-21 @ 20:52  Lying BP: --/-- HR: --  Sitting BP: 147/95 HR: 91  Standing BP: 134/90 HR: 106  Site: --  Mode: --
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
## 2021-10-28 PROCEDURE — 99232 SBSQ HOSP IP/OBS MODERATE 35: CPT

## 2021-10-28 RX ORDER — OLANZAPINE 15 MG/1
10 TABLET, FILM COATED ORAL ONCE
Refills: 0 | Status: COMPLETED | OUTPATIENT
Start: 2021-10-28 | End: 2021-10-28

## 2021-10-28 RX ORDER — OLANZAPINE 15 MG/1
10 TABLET, FILM COATED ORAL THREE TIMES A DAY
Refills: 0 | Status: DISCONTINUED | OUTPATIENT
Start: 2021-10-28 | End: 2021-10-29

## 2021-10-28 RX ADMIN — Medication 50 MILLIGRAM(S): at 20:46

## 2021-10-28 RX ADMIN — OLANZAPINE 10 MILLIGRAM(S): 15 TABLET, FILM COATED ORAL at 15:17

## 2021-10-28 RX ADMIN — HALOPERIDOL DECANOATE 5 MILLIGRAM(S): 100 INJECTION INTRAMUSCULAR at 20:46

## 2021-10-28 RX ADMIN — Medication 2 MILLIGRAM(S): at 20:45

## 2021-10-28 RX ADMIN — Medication 2 MILLIGRAM(S): at 12:17
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
## 2021-10-28 NOTE — BH INPATIENT PSYCHIATRY PROGRESS NOTE - NSBHCHARTREVIEWVS_PSY_A_CORE FT
Vital Signs Last 24 Hrs  T(C): 35.8 (10-28-21 @ 06:33), Max: 36.1 (10-27-21 @ 20:51)  T(F): 96.4 (10-28-21 @ 06:33), Max: 97 (10-27-21 @ 20:51)  HR: 66 (10-28-21 @ 06:33) (66 - 69)  BP: 144/86 (10-28-21 @ 06:33) (117/59 - 144/86)  BP(mean): --  RR: --  SpO2: --
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
## 2021-10-29 PROCEDURE — 99233 SBSQ HOSP IP/OBS HIGH 50: CPT

## 2021-10-29 RX ORDER — RISPERIDONE 4 MG/1
2 TABLET ORAL
Refills: 0 | Status: DISCONTINUED | OUTPATIENT
Start: 2021-10-29 | End: 2021-11-03

## 2021-10-29 RX ADMIN — Medication 2 MILLIGRAM(S): at 21:22

## 2021-10-29 RX ADMIN — Medication 50 MILLIGRAM(S): at 21:21

## 2021-10-29 RX ADMIN — Medication 2 MILLIGRAM(S): at 08:24

## 2021-10-29 NOTE — BH INPATIENT PSYCHIATRY PROGRESS NOTE - NSBHCHARTREVIEWVS_PSY_A_CORE FT
Vital Signs Last 24 Hrs  T(C): 36.4 (10-29-21 @ 06:25), Max: 36.4 (10-29-21 @ 06:25)  T(F): 97.6 (10-29-21 @ 06:25), Max: 97.6 (10-29-21 @ 06:25)  HR: 81 (10-29-21 @ 06:25) (63 - 81)  BP: 126/78 (10-29-21 @ 06:25) (123/59 - 126/78)  BP(mean): --  RR: 17 (10-29-21 @ 06:25) (17 - 17)  SpO2: --
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
## 2021-10-29 NOTE — BH INPATIENT PSYCHIATRY PROGRESS NOTE - NSBHFUPINTERVALHXFT_PSY_A_CORE
Chart reviewed, including vital signs, medication administration record, lab results, and nursing notes.   Case discussed with nursing, psychology, psych rehab, and social work in team meeting.   - Patient continues to refuse antipsychotic medication    Patient is seen in his room, accompanied by a mental health worker. Patient is sleeping, but awakens and is minimally cooperative an interview. 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 You‘re the one in the way.“ States that his only desire is to be discharged, and he continues to decline all psychotropic medication. The patient is obscene and ends the interview prematurely.

## 2021-10-30 PROCEDURE — 99231 SBSQ HOSP IP/OBS SF/LOW 25: CPT

## 2021-10-30 RX ORDER — ACETAMINOPHEN 500 MG
650 TABLET ORAL EVERY 6 HOURS
Refills: 0 | Status: DISCONTINUED | OUTPATIENT
Start: 2021-10-30 | End: 2021-11-23

## 2021-10-30 RX ADMIN — Medication 2 MILLIGRAM(S): at 09:10

## 2021-10-30 RX ADMIN — Medication 50 MILLIGRAM(S): at 10:15

## 2021-10-30 RX ADMIN — Medication 650 MILLIGRAM(S): at 09:36

## 2021-10-30 RX ADMIN — Medication 4 MILLIGRAM(S): at 13:30

## 2021-10-30 RX ADMIN — Medication 650 MILLIGRAM(S): at 10:39

## 2021-10-30 RX ADMIN — Medication 50 MILLIGRAM(S): at 20:36

## 2021-10-30 NOTE — BH INPATIENT PSYCHIATRY PROGRESS NOTE - NSBHASSESSSUMMFT_PSY_ALL_CORE
patient remains psychotic, irritable, unpredictable, noncompliant with meds.  continue current treatment

## 2021-10-30 NOTE — BH INPATIENT PSYCHIATRY PROGRESS NOTE - NSBHCHARTREVIEWVS_PSY_A_CORE FT
Vital Signs Last 24 Hrs  T(C): 36.4 (10-30-21 @ 06:16), Max: 36.7 (10-29-21 @ 20:39)  T(F): 97.6 (10-30-21 @ 06:16), Max: 98 (10-29-21 @ 20:39)  HR: 83 (10-30-21 @ 06:16) (83 - 89)  BP: 128/74 (10-30-21 @ 06:16) (128/74 - 140/86)  BP(mean): --  RR: 17 (10-30-21 @ 06:16) (17 - 17)  SpO2: --

## 2021-10-30 NOTE — BH INPATIENT PSYCHIATRY PROGRESS NOTE - NSBHFUPINTERVALHXFT_PSY_A_CORE
Patient seen for follow up of psychosis with aggression. Case reviewed with comprehensive treatment team. No acute events overnight. Fair behavioral control. fair self-care. Patient is refusing meds, says he does not have anything wrong with him, unable to give a meaningful narrative of events preceding hospitalization.  States "sometimes you need to be angry and assert your power."  He laughs inappropriately.

## 2021-10-31 PROCEDURE — 99231 SBSQ HOSP IP/OBS SF/LOW 25: CPT

## 2021-10-31 RX ORDER — IBUPROFEN 200 MG
400 TABLET ORAL ONCE
Refills: 0 | Status: COMPLETED | OUTPATIENT
Start: 2021-10-31 | End: 2021-11-03

## 2021-10-31 RX ADMIN — Medication 650 MILLIGRAM(S): at 18:45

## 2021-10-31 RX ADMIN — Medication 2 MILLIGRAM(S): at 19:44

## 2021-10-31 RX ADMIN — Medication 650 MILLIGRAM(S): at 08:53

## 2021-10-31 RX ADMIN — Medication 50 MILLIGRAM(S): at 19:44

## 2021-10-31 RX ADMIN — Medication 650 MILLIGRAM(S): at 18:05

## 2021-10-31 RX ADMIN — Medication 4 MILLIGRAM(S): at 14:20

## 2021-10-31 RX ADMIN — Medication 2 MILLIGRAM(S): at 20:58

## 2021-10-31 RX ADMIN — Medication 650 MILLIGRAM(S): at 09:42

## 2021-10-31 NOTE — BH INPATIENT PSYCHIATRY PROGRESS NOTE - NSBHCHARTREVIEWVS_PSY_A_CORE FT
Vital Signs Last 24 Hrs  T(C): 36 (10-31-21 @ 06:28), Max: 36.3 (10-30-21 @ 20:52)  T(F): 96.8 (10-31-21 @ 06:28), Max: 97.4 (10-30-21 @ 20:52)  HR: 102 (10-30-21 @ 20:52) (102 - 102)  BP: 144/89 (10-30-21 @ 20:52) (144/89 - 144/89)  BP(mean): --  RR: --  SpO2: --    Orthostatic VS  10-31-21 @ 06:28  Lying BP: --/-- HR: --  Sitting BP: 140/88 HR: 92  Standing BP: --/-- HR: --  Site: --  Mode: --
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
## 2021-11-01 LAB — SARS-COV-2 RNA SPEC QL NAA+PROBE: SIGNIFICANT CHANGE UP

## 2021-11-01 PROCEDURE — 99232 SBSQ HOSP IP/OBS MODERATE 35: CPT

## 2021-11-01 RX ORDER — OLANZAPINE 15 MG/1
10 TABLET, FILM COATED ORAL ONCE
Refills: 0 | Status: DISCONTINUED | OUTPATIENT
Start: 2021-11-01 | End: 2021-11-02

## 2021-11-01 RX ADMIN — Medication 50 MILLIGRAM(S): at 18:41

## 2021-11-01 RX ADMIN — Medication 650 MILLIGRAM(S): at 18:23

## 2021-11-01 RX ADMIN — Medication 650 MILLIGRAM(S): at 20:14

## 2021-11-01 RX ADMIN — Medication 2 MILLIGRAM(S): at 17:04

## 2021-11-01 NOTE — BH INPATIENT PSYCHIATRY PROGRESS NOTE - NSBHCHARTREVIEWVS_PSY_A_CORE FT
Vital Signs Last 24 Hrs  T(C): 36.3 (11-01-21 @ 06:18), Max: 36.3 (11-01-21 @ 06:18)  T(F): 97.3 (11-01-21 @ 06:18), Max: 97.3 (11-01-21 @ 06:18)  HR: 100 (11-01-21 @ 06:18) (100 - 100)  BP: 130/87 (11-01-21 @ 06:18) (130/87 - 130/87)  BP(mean): --  RR: --  SpO2: --    Orthostatic VS  10-31-21 @ 06:28  Lying BP: --/-- HR: --  Sitting BP: 140/88 HR: 92  Standing BP: --/-- HR: --  Site: --  Mode: --
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
## 2021-11-01 NOTE — BH INPATIENT PSYCHIATRY PROGRESS NOTE - NSBHFUPINTERVALHXFT_PSY_A_CORE
Chart reviewed, including vital signs, medication administration record, lab results, and nursing notes.   Case discussed with nursing, psychology, psych rehab, and social work in team meeting.   - Patient continues to refuse antipsychotic medication    Patient is seen in the day room, accompanied by a mental health worker. He refuses to engage with me, but is willing to talk to other members of the treatment team. He states that he “cursed out a crazy looking lady“, and states that he “needs to take care of her“. He is vague and unable to identify to which patient he is referring. He continues to adamantly decline antipsychotic medication. Denies SI/HI/AVH.

## 2021-11-02 PROCEDURE — 99232 SBSQ HOSP IP/OBS MODERATE 35: CPT

## 2021-11-02 RX ORDER — OLANZAPINE 15 MG/1
20 TABLET, FILM COATED ORAL EVERY 12 HOURS
Refills: 0 | Status: DISCONTINUED | OUTPATIENT
Start: 2021-11-02 | End: 2021-11-03

## 2021-11-02 RX ORDER — OLANZAPINE 15 MG/1
20 TABLET, FILM COATED ORAL EVERY 12 HOURS
Refills: 0 | Status: DISCONTINUED | OUTPATIENT
Start: 2021-11-02 | End: 2021-11-02

## 2021-11-02 RX ADMIN — Medication 2 MILLIGRAM(S): at 15:24

## 2021-11-02 RX ADMIN — Medication 50 MILLIGRAM(S): at 15:24

## 2021-11-02 NOTE — BH INPATIENT PSYCHIATRY PROGRESS NOTE - NSBHCHARTREVIEWVS_PSY_A_CORE FT
Vital Signs Last 24 Hrs  T(C): 36.2 (11-02-21 @ 08:07), Max: 36.2 (11-02-21 @ 08:07)  T(F): 97.2 (11-02-21 @ 08:07), Max: 97.2 (11-02-21 @ 08:07)  HR: --  BP: --  BP(mean): --  RR: 18 (11-02-21 @ 08:07) (18 - 18)  SpO2: --    Orthostatic VS  11-02-21 @ 08:07  Lying BP: --/-- HR: --  Sitting BP: 149/95 HR: 83  Standing BP: --/-- HR: --  Site: --  Mode: --
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
## 2021-11-02 NOTE — BH INPATIENT PSYCHIATRY PROGRESS NOTE - NSBHFUPINTERVALHXFT_PSY_A_CORE
Chart reviewed, including vital signs, medication administration record, lab results, and nursing notes.   Case discussed with nursing, psychology, psych rehab, and social work in team meeting.   - Patient continues to refuse antipsychotic medication    The patient is seen in the hallway, accompanied by a mental health worker. He refuses to engage me, and continues to refuse antipsychotic medication.

## 2021-11-03 PROCEDURE — 99233 SBSQ HOSP IP/OBS HIGH 50: CPT

## 2021-11-03 RX ORDER — CHLORPROMAZINE HCL 10 MG
25 TABLET ORAL DAILY
Refills: 0 | Status: DISCONTINUED | OUTPATIENT
Start: 2021-11-03 | End: 2021-11-03

## 2021-11-03 RX ORDER — CHLORPROMAZINE HCL 10 MG
50 TABLET ORAL EVERY 4 HOURS
Refills: 0 | Status: DISCONTINUED | OUTPATIENT
Start: 2021-11-03 | End: 2021-11-04

## 2021-11-03 RX ORDER — CHLORPROMAZINE HCL 10 MG
50 TABLET ORAL ONCE
Refills: 0 | Status: DISCONTINUED | OUTPATIENT
Start: 2021-11-03 | End: 2021-11-04

## 2021-11-03 RX ORDER — CHLORPROMAZINE HCL 10 MG
50 TABLET ORAL DAILY
Refills: 0 | Status: DISCONTINUED | OUTPATIENT
Start: 2021-11-03 | End: 2021-11-04

## 2021-11-03 RX ORDER — LITHIUM CARBONATE 300 MG/1
900 TABLET, EXTENDED RELEASE ORAL AT BEDTIME
Refills: 0 | Status: DISCONTINUED | OUTPATIENT
Start: 2021-11-03 | End: 2021-11-05

## 2021-11-03 RX ORDER — CHLORPROMAZINE HCL 10 MG
50 TABLET ORAL ONCE
Refills: 0 | Status: COMPLETED | OUTPATIENT
Start: 2021-11-03 | End: 2021-11-03

## 2021-11-03 RX ORDER — RISPERIDONE 4 MG/1
2 TABLET ORAL
Refills: 0 | Status: DISCONTINUED | OUTPATIENT
Start: 2021-11-03 | End: 2021-11-03

## 2021-11-03 RX ADMIN — Medication 50 MILLIGRAM(S): at 10:20

## 2021-11-03 RX ADMIN — Medication 2 MILLIGRAM(S): at 02:48

## 2021-11-03 RX ADMIN — Medication 400 MILLIGRAM(S): at 16:46

## 2021-11-03 RX ADMIN — Medication 50 MILLIGRAM(S): at 10:03

## 2021-11-03 RX ADMIN — OLANZAPINE 20 MILLIGRAM(S): 15 TABLET, FILM COATED ORAL at 09:30

## 2021-11-03 RX ADMIN — Medication 2 MILLIGRAM(S): at 12:04

## 2021-11-03 RX ADMIN — Medication 50 MILLIGRAM(S): at 02:48

## 2021-11-03 RX ADMIN — Medication 400 MILLIGRAM(S): at 21:19

## 2021-11-03 RX ADMIN — Medication 2 MILLIGRAM(S): at 21:09

## 2021-11-03 RX ADMIN — LITHIUM CARBONATE 900 MILLIGRAM(S): 300 TABLET, EXTENDED RELEASE ORAL at 21:08
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
## 2021-11-03 NOTE — BH INPATIENT PSYCHIATRY PROGRESS NOTE - NSBHCHARTREVIEWVS_PSY_A_CORE FT
Vital Signs Last 24 Hrs  T(C): 36.7 (11-02-21 @ 20:47), Max: 36.7 (11-02-21 @ 20:47)  T(F): 98.1 (11-02-21 @ 20:47), Max: 98.1 (11-02-21 @ 20:47)  HR: --  BP: --  BP(mean): --  RR: --  SpO2: --    Orthostatic VS  11-02-21 @ 20:47  Lying BP: --/-- HR: --  Sitting BP: 139/85 HR: 97  Standing BP: 140/83 HR: 100  Site: --  Mode: --  Orthostatic VS  11-02-21 @ 15:46  Lying BP: --/-- HR: --  Sitting BP: 150/90 HR: 99  Standing BP: --/-- HR: --  Site: --  Mode: --  Orthostatic VS  11-02-21 @ 08:07  Lying BP: --/-- HR: --  Sitting BP: 149/95 HR: 83  Standing BP: --/-- HR: --  Site: --  Mode: --

## 2021-11-03 NOTE — BH INPATIENT PSYCHIATRY PROGRESS NOTE - NSBHFUPINTERVALHXFT_PSY_A_CORE
Chart reviewed, including vital signs, medication administration record, lab results, and nursing notes.   Case discussed with nursing, psychology, psych rehab, and social work in team meeting.   - Patient has been making homicidal threats against staff    Patient is seen in the patio, accompanied by a mental health worker and PES. 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 After receiving an injection of Zyprexa, the patient appeared more calm. We discussed the plan to start lithium for aggressive behavior as well as Thorazine. The patient is extremely irritable and threatening.     Later, he was seen sprinting to a room where I was, banging on the window, and attempting to open the door.

## 2021-11-04 PROCEDURE — 99233 SBSQ HOSP IP/OBS HIGH 50: CPT

## 2021-11-04 RX ORDER — OLANZAPINE 15 MG/1
20 TABLET, FILM COATED ORAL EVERY 12 HOURS
Refills: 0 | Status: DISCONTINUED | OUTPATIENT
Start: 2021-11-04 | End: 2021-11-04

## 2021-11-04 RX ORDER — OLANZAPINE 15 MG/1
10 TABLET, FILM COATED ORAL ONCE
Refills: 0 | Status: DISCONTINUED | OUTPATIENT
Start: 2021-11-04 | End: 2021-11-23

## 2021-11-04 RX ORDER — OLANZAPINE 15 MG/1
20 TABLET, FILM COATED ORAL AT BEDTIME
Refills: 0 | Status: DISCONTINUED | OUTPATIENT
Start: 2021-11-04 | End: 2021-11-05

## 2021-11-04 RX ADMIN — Medication 2 MILLIGRAM(S): at 12:54

## 2021-11-04 RX ADMIN — Medication 2 MILLIGRAM(S): at 21:33

## 2021-11-04 RX ADMIN — LITHIUM CARBONATE 900 MILLIGRAM(S): 300 TABLET, EXTENDED RELEASE ORAL at 21:33

## 2021-11-04 RX ADMIN — Medication 2 MILLIGRAM(S): at 08:38

## 2021-11-04 NOTE — BH INPATIENT PSYCHIATRY PROGRESS NOTE - NSBHFUPINTERVALHXFT_PSY_A_CORE
Chart reviewed, including vital signs, medication administration record, lab results, and nursing notes.   Case discussed with nursing, psychology, psych rehab, and social work in team meeting.   - Patient refused Thorazine, still threatening staff    The patient is seen sitting in bed, wearing a hospital gown, accompanied by a mental health worker. The patient continues to be aggressive and irritable. He states “you think I want to be in here?“ He continues to decline all psychotropic medication and demands to be discharged from the hospital. He continues to threaten this provider, and is unwilling to have a discussion about medication adjustments.

## 2021-11-04 NOTE — BH INPATIENT PSYCHIATRY ASSESSMENT NOTE - RISK ASSESSMENT
Risk factors include single, male, unemployed, acute psychosis, med noncompliant, anxiety, hopelessness possible substance abuse, recent overdose, recent hospitalization. Protective factors include stability of domicile, supportive family. Given the above, patient is felt to be at high imminent risk of harm to others and given suicide history he is at elevated risk of harm to self (though unable to assess for suicidality at this time). He requires inpatient hospitalization.  
immune

## 2021-11-04 NOTE — BH INPATIENT PSYCHIATRY PROGRESS NOTE - NSBHCHARTREVIEWVS_PSY_A_CORE FT
Vital Signs Last 24 Hrs  T(C): 36.6 (11-04-21 @ 06:32), Max: 36.6 (11-04-21 @ 06:32)  T(F): 97.9 (11-04-21 @ 06:32), Max: 97.9 (11-04-21 @ 06:32)  HR: 77 (11-04-21 @ 06:32) (77 - 77)  BP: 136/73 (11-04-21 @ 06:32) (136/73 - 136/73)  BP(mean): --  RR: --  SpO2: --    Orthostatic VS  11-03-21 @ 20:26  Lying BP: --/-- HR: --  Sitting BP: 104/64 HR: 79  Standing BP: --/-- HR: --  Site: --  Mode: --  Orthostatic VS  11-02-21 @ 20:47  Lying BP: --/-- HR: --  Sitting BP: 139/85 HR: 97  Standing BP: 140/83 HR: 100  Site: --  Mode: --  Orthostatic VS  11-02-21 @ 15:46  Lying BP: --/-- HR: --  Sitting BP: 150/90 HR: 99  Standing BP: --/-- HR: --  Site: --  Mode: --
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
## 2021-11-05 PROCEDURE — 99233 SBSQ HOSP IP/OBS HIGH 50: CPT

## 2021-11-05 RX ORDER — OLANZAPINE 15 MG/1
5 TABLET, FILM COATED ORAL AT BEDTIME
Refills: 0 | Status: DISCONTINUED | OUTPATIENT
Start: 2021-11-05 | End: 2021-11-23

## 2021-11-05 RX ORDER — LITHIUM CARBONATE 300 MG/1
900 TABLET, EXTENDED RELEASE ORAL
Refills: 0 | Status: DISCONTINUED | OUTPATIENT
Start: 2021-11-05 | End: 2021-11-23

## 2021-11-05 RX ADMIN — LITHIUM CARBONATE 900 MILLIGRAM(S): 300 TABLET, EXTENDED RELEASE ORAL at 18:51

## 2021-11-05 RX ADMIN — Medication 2 MILLIGRAM(S): at 10:23

## 2021-11-05 RX ADMIN — Medication 2 MILLIGRAM(S): at 22:04

## 2021-11-05 RX ADMIN — Medication 650 MILLIGRAM(S): at 13:30
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
## 2021-11-05 NOTE — BH INPATIENT PSYCHIATRY PROGRESS NOTE - NSBHFUPINTERVALHXFT_PSY_A_CORE
Chart reviewed, including vital signs, medication administration record, lab results, and nursing notes.   Case discussed with nursing, psychology, psych rehab, and social work in team meeting.   - Patient refused to take Zyprexa    Patient is seen in the group room, in no acute distress, amenable to interview. He is accompanied by a mental health worker. Notably, the patient is less hostile and irritable today. He states that his “bad thoughts“ are gone. 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 He continues to decline antipsychotic medication, because he feels that he cannot think clearly while taking them. We ultimately agree to start Zyprexa at a low dose. He reports that lithium helps him, but states that he has been experiencing some G.I. side effects. We discussed the plan to administer lithium after dinner. Reports stable sleep and appetite. Denies SI/HI/AVH.

## 2021-11-05 NOTE — BH INPATIENT PSYCHIATRY PROGRESS NOTE - NSBHCHARTREVIEWVS_PSY_A_CORE FT
Vital Signs Last 24 Hrs  T(C): 36.5 (11-05-21 @ 08:40), Max: 37.1 (11-04-21 @ 21:06)  T(F): 97.7 (11-05-21 @ 08:40), Max: 98.8 (11-04-21 @ 21:06)  HR: 88 (11-05-21 @ 08:40) (88 - 111)  BP: 141/84 (11-05-21 @ 08:40) (141/84 - 154/90)  BP(mean): --  RR: --  SpO2: --    Orthostatic VS  11-03-21 @ 20:26  Lying BP: --/-- HR: --  Sitting BP: 104/64 HR: 79  Standing BP: --/-- HR: --  Site: --  Mode: --

## 2021-11-06 PROCEDURE — 99232 SBSQ HOSP IP/OBS MODERATE 35: CPT

## 2021-11-06 RX ORDER — OLANZAPINE 15 MG/1
5 TABLET, FILM COATED ORAL EVERY 4 HOURS
Refills: 0 | Status: DISCONTINUED | OUTPATIENT
Start: 2021-11-06 | End: 2021-11-23

## 2021-11-06 RX ADMIN — Medication 650 MILLIGRAM(S): at 17:21

## 2021-11-06 RX ADMIN — Medication 2 MILLIGRAM(S): at 13:56

## 2021-11-06 RX ADMIN — LITHIUM CARBONATE 900 MILLIGRAM(S): 300 TABLET, EXTENDED RELEASE ORAL at 18:10

## 2021-11-06 RX ADMIN — Medication 2 MILLIGRAM(S): at 20:33

## 2021-11-06 RX ADMIN — Medication 2 MILLIGRAM(S): at 09:21
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
## 2021-11-06 NOTE — BH INPATIENT PSYCHIATRY PROGRESS NOTE - NSBHFUPINTERVALHXFT_PSY_A_CORE
patient seen for follow up of psychosis and aggression  Chart reviewed and case discussed with nursing.  patient remembers me from admission  seen with MHW as patient continues on [employer]   Continues to endorse homicidal ideation towards psychiatrists   Patient refused to take any antipsychotics and will only take lithium and propranolol

## 2021-11-06 NOTE — BH INPATIENT PSYCHIATRY PROGRESS NOTE - NSBHMETABOLIC_PSY_ALL_CORE_FT
BMI:   HbA1c:   Glucose:   BP: 110/90 (11-06-21 @ 06:52) (110/90 - 154/90)  Lipid Panel:  spoke with MD to inform that pt is tachycardic to 109

## 2021-11-06 NOTE — BH INPATIENT PSYCHIATRY PROGRESS NOTE - NSBHCHARTREVIEWVS_PSY_A_CORE FT
Vital Signs Last 24 Hrs  T(C): 36.2 (11-06-21 @ 06:52), Max: 36.4 (11-05-21 @ 20:51)  T(F): 97.2 (11-06-21 @ 06:52), Max: 97.5 (11-05-21 @ 20:51)  HR: 88 (11-06-21 @ 06:52) (77 - 88)  BP: 110/90 (11-06-21 @ 06:52) (110/90 - 122/74)  BP(mean): 122 (11-05-21 @ 14:50) (122 - 122)  RR: --  SpO2: --

## 2021-11-07 PROCEDURE — 99232 SBSQ HOSP IP/OBS MODERATE 35: CPT

## 2021-11-07 RX ADMIN — LITHIUM CARBONATE 900 MILLIGRAM(S): 300 TABLET, EXTENDED RELEASE ORAL at 17:23

## 2021-11-07 RX ADMIN — Medication 2 MILLIGRAM(S): at 20:51

## 2021-11-07 RX ADMIN — Medication 2 MILLIGRAM(S): at 08:36
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
## 2021-11-07 NOTE — BH INPATIENT PSYCHIATRY PROGRESS NOTE - MSE UNSTRUCTURED FT
Patient is casually dressed, calm, cooperative, oddly-related, fair EC. No PMA / PMR. Speech is wnl. Mood "fine" and affect is blunted, minimally reactive. TP perseverative, loose at times.  Denies SI/HI, denies AH, + delusions. Cognition grossly intact. I&J poor.   
On exam today the patient is more cooperative.    Speech is clear and of normal rate.    Thought process: with no evidence of a disorder of thought process.    Thought content: with paranoid delusions and still with homicidal ideation.  Perception: Denies hallucinations.    Mood: Describes as "OK".  Affect: flat.    Patient denies active suicidal ideation, intent and plan.    Patient is Alert and oriented in all spheres. Patient is cognitively grossly intact. Fund of knowledge is fair. Memory is intact  Insight and judgment are impaired. Impulse control is intact at this time.    
On exam today the patient is only superficially cooperative.    Speech is clear and of normal rate.    Thought process: with no evidence of a disorder of thought process.    Thought content: with paranoid delusions and homicidal ideation.  Perception: Denies hallucinations.    Mood: Describes as "OK".  Affect: flat.    Patient denies active suicidal ideation, intent and plan.    Patient  has been aggressive on the unit and still with homicidal ideation.   Patient is Alert and oriented in all spheres. Patient is cognitively grossly intact. Fund of knowledge is fair. Memory is intact  Insight and judgment are impaired. Impulse control is intact at this time.    
On exam today the patient is irritable and paranoid .    Speech is mildly pressured and rapid.    Thought process: with tangential thoughts.    Thought content: with paranoid delusions about peers being transgender.  Patient is Alert and oriented in all spheres. Patient is cognitively grossly intact.   Insight and judgment are grossly impaired. Impulse control is poor.    
Patient is casually dressed, calm, cooperative, oddly-related, fair EC. No PMA / PMR. Speech is wnl. Mood "fine" and affect is blunted, minimally reactive. TP perseverative, loose at times.  Denies SI/HI, denies AH, + delusions. Cognition grossly intact. I&J poor.

## 2021-11-07 NOTE — BH INPATIENT PSYCHIATRY PROGRESS NOTE - NSBHCHARTREVIEWVS_PSY_A_CORE FT
Vital Signs Last 24 Hrs  T(C): 36.7 (11-07-21 @ 07:39), Max: 36.7 (11-06-21 @ 20:55)  T(F): 98.1 (11-07-21 @ 07:39), Max: 98.1 (11-06-21 @ 20:55)  HR: 97 (11-07-21 @ 07:39) (95 - 97)  BP: 130/76 (11-07-21 @ 07:39) (128/77 - 130/76)  BP(mean): --  RR: --  SpO2: --

## 2021-11-07 NOTE — BH INPATIENT PSYCHIATRY PROGRESS NOTE - NSBHFUPINTERVALHXFT_PSY_A_CORE
Patient seen in interview room with [facility] for follow up of psychosis and aggression  Chart reviewed and case discussed with nursing.  Patient less hostile today   More willing to discuss his HI  asking about discontinuing CO  Patient refused to take any antipsychotics and will only take lithium and propranolol

## 2021-11-08 LAB — LITHIUM SERPL-MCNC: 0.4 MMOL/L — LOW (ref 0.6–1.2)

## 2021-11-08 PROCEDURE — 99232 SBSQ HOSP IP/OBS MODERATE 35: CPT

## 2021-11-08 RX ORDER — OLANZAPINE 15 MG/1
10 TABLET, FILM COATED ORAL ONCE
Refills: 0 | Status: COMPLETED | OUTPATIENT
Start: 2021-11-08 | End: 2021-11-08

## 2021-11-08 RX ORDER — HALOPERIDOL DECANOATE 100 MG/ML
5 INJECTION INTRAMUSCULAR EVERY 4 HOURS
Refills: 0 | Status: DISCONTINUED | OUTPATIENT
Start: 2021-11-08 | End: 2021-11-23

## 2021-11-08 RX ORDER — HALOPERIDOL DECANOATE 100 MG/ML
5 INJECTION INTRAMUSCULAR ONCE
Refills: 0 | Status: DISCONTINUED | OUTPATIENT
Start: 2021-11-08 | End: 2021-11-23

## 2021-11-08 RX ORDER — CHLORPROMAZINE HCL 10 MG
50 TABLET ORAL EVERY 4 HOURS
Refills: 0 | Status: DISCONTINUED | OUTPATIENT
Start: 2021-11-08 | End: 2021-11-08

## 2021-11-08 RX ORDER — LITHIUM CARBONATE 300 MG/1
450 TABLET, EXTENDED RELEASE ORAL DAILY
Refills: 0 | Status: DISCONTINUED | OUTPATIENT
Start: 2021-11-08 | End: 2021-11-23

## 2021-11-08 RX ORDER — LITHIUM CARBONATE 300 MG/1
450 TABLET, EXTENDED RELEASE ORAL ONCE
Refills: 0 | Status: DISCONTINUED | OUTPATIENT
Start: 2021-11-08 | End: 2021-11-08

## 2021-11-08 RX ADMIN — LITHIUM CARBONATE 450 MILLIGRAM(S): 300 TABLET, EXTENDED RELEASE ORAL at 12:59

## 2021-11-08 RX ADMIN — LITHIUM CARBONATE 900 MILLIGRAM(S): 300 TABLET, EXTENDED RELEASE ORAL at 18:08

## 2021-11-08 RX ADMIN — Medication 650 MILLIGRAM(S): at 13:00

## 2021-11-08 RX ADMIN — OLANZAPINE 10 MILLIGRAM(S): 15 TABLET, FILM COATED ORAL at 11:59

## 2021-11-08 RX ADMIN — Medication 2 MILLIGRAM(S): at 08:53

## 2021-11-08 RX ADMIN — Medication 650 MILLIGRAM(S): at 21:10

## 2021-11-08 RX ADMIN — Medication 2 MILLIGRAM(S): at 20:59

## 2021-11-08 RX ADMIN — Medication 650 MILLIGRAM(S): at 12:58

## 2021-11-08 NOTE — BH INPATIENT PSYCHIATRY PROGRESS NOTE - NSBHCHARTREVIEWVS_PSY_A_CORE FT
Vital Signs Last 24 Hrs  T(C): 36.4 (11-08-21 @ 06:39), Max: 36.6 (11-07-21 @ 20:36)  T(F): 97.6 (11-08-21 @ 06:39), Max: 97.9 (11-07-21 @ 20:36)  HR: 85 (11-08-21 @ 06:39) (85 - 92)  BP: 109/87 (11-08-21 @ 06:39) (109/87 - 140/90)  BP(mean): --  RR: 17 (11-08-21 @ 06:39) (17 - 17)  SpO2: --     Vital Signs Last 24 Hrs  T(C): 36.2 (11-08-21 @ 12:50), Max: 36.6 (11-07-21 @ 20:36)  T(F): 97.1 (11-08-21 @ 12:50), Max: 97.9 (11-07-21 @ 20:36)  HR: 84 (11-08-21 @ 12:50) (84 - 92)  BP: 108/63 (11-08-21 @ 12:50) (108/63 - 140/90)  BP(mean): --  RR: 18 (11-08-21 @ 12:50) (17 - 18)  SpO2: 98% (11-08-21 @ 12:50) (98% - 98%)
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
## 2021-11-08 NOTE — BH INPATIENT PSYCHIATRY PROGRESS NOTE - NSBHMETABOLIC_PSY_ALL_CORE_FT
BMI:   HbA1c:   Glucose:   BP: 109/87 (11-08-21 @ 06:39) (109/87 - 140/90)  Lipid Panel:  BMI:   HbA1c:   Glucose:   BP: 108/63 (11-08-21 @ 12:50) (108/63 - 140/90)  Lipid Panel:
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
## 2021-11-09 PROCEDURE — 90853 GROUP PSYCHOTHERAPY: CPT

## 2021-11-09 PROCEDURE — 99232 SBSQ HOSP IP/OBS MODERATE 35: CPT | Mod: 25

## 2021-11-09 RX ORDER — SULINDAC 200 MG/1
200 TABLET ORAL EVERY 12 HOURS
Refills: 0 | Status: DISCONTINUED | OUTPATIENT
Start: 2021-11-09 | End: 2021-11-19

## 2021-11-09 RX ADMIN — Medication 2 MILLIGRAM(S): at 20:56

## 2021-11-09 RX ADMIN — LITHIUM CARBONATE 900 MILLIGRAM(S): 300 TABLET, EXTENDED RELEASE ORAL at 17:01

## 2021-11-09 RX ADMIN — LITHIUM CARBONATE 450 MILLIGRAM(S): 300 TABLET, EXTENDED RELEASE ORAL at 08:10

## 2021-11-09 RX ADMIN — SULINDAC 200 MILLIGRAM(S): 200 TABLET ORAL at 15:26

## 2021-11-09 RX ADMIN — Medication 650 MILLIGRAM(S): at 10:59

## 2021-11-09 RX ADMIN — Medication 4 MILLIGRAM(S): at 10:10

## 2021-11-09 RX ADMIN — SULINDAC 200 MILLIGRAM(S): 200 TABLET ORAL at 16:00

## 2021-11-09 RX ADMIN — Medication 2 MILLIGRAM(S): at 08:10

## 2021-11-09 RX ADMIN — Medication 650 MILLIGRAM(S): at 11:49

## 2021-11-09 NOTE — BH INPATIENT PSYCHIATRY PROGRESS NOTE - NSBHCHARTREVIEWVS_PSY_A_CORE FT
Vital Signs Last 24 Hrs  T(C): 35.9 (11-09-21 @ 08:17), Max: 36.3 (11-08-21 @ 20:44)  T(F): 96.7 (11-09-21 @ 08:17), Max: 97.3 (11-08-21 @ 20:44)  HR: 90 (11-09-21 @ 08:17) (84 - 90)  BP: 131/88 (11-09-21 @ 08:17) (108/63 - 131/88)  BP(mean): --  RR: 18 (11-08-21 @ 12:50) (18 - 18)  SpO2: 98% (11-08-21 @ 12:50) (98% - 98%)    Orthostatic VS  11-08-21 @ 20:44  Lying BP: --/-- HR: --  Sitting BP: 121/56 HR: 78  Standing BP: --/-- HR: --  Site: --  Mode: --
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
## 2021-11-09 NOTE — BH INPATIENT PSYCHIATRY PROGRESS NOTE - NSBHFUPINTERVALHXFT_PSY_A_CORE
Chart reviewed and case discussed with treatment team. No events reported overnight. Sleep and appetite is fair. Patient remains with no insight into his illness and has been refusing the Zyprexa. He continues to endorse paranoid delusions and misinterprets comments from staff as being antagonized and provoked. TOO is being pursued. Patient is compliant with all other medications, no adverse effects reported.

## 2021-11-10 PROCEDURE — 99232 SBSQ HOSP IP/OBS MODERATE 35: CPT | Mod: 25

## 2021-11-10 PROCEDURE — 90853 GROUP PSYCHOTHERAPY: CPT

## 2021-11-10 RX ADMIN — LITHIUM CARBONATE 450 MILLIGRAM(S): 300 TABLET, EXTENDED RELEASE ORAL at 08:14

## 2021-11-10 RX ADMIN — LITHIUM CARBONATE 900 MILLIGRAM(S): 300 TABLET, EXTENDED RELEASE ORAL at 17:07

## 2021-11-10 RX ADMIN — Medication 2 MILLIGRAM(S): at 08:14

## 2021-11-10 RX ADMIN — Medication 650 MILLIGRAM(S): at 09:31

## 2021-11-10 RX ADMIN — Medication 2 MILLIGRAM(S): at 20:49

## 2021-11-10 RX ADMIN — Medication 650 MILLIGRAM(S): at 08:07
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
## 2021-11-10 NOTE — BH INPATIENT PSYCHIATRY PROGRESS NOTE - NSBHCHARTREVIEWVS_PSY_A_CORE FT
Vital Signs Last 24 Hrs  T(C): 36.2 (11-10-21 @ 06:28), Max: 36.6 (11-09-21 @ 20:49)  T(F): 97.1 (11-10-21 @ 06:28), Max: 97.8 (11-09-21 @ 20:49)  HR: 89 (11-10-21 @ 06:28) (87 - 89)  BP: 130/88 (11-10-21 @ 06:28) (130/88 - 152/82)  BP(mean): --  RR: 16 (11-10-21 @ 08:00) (16 - 16)  SpO2: --    Orthostatic VS  11-08-21 @ 20:44  Lying BP: --/-- HR: --  Sitting BP: 121/56 HR: 78  Standing BP: --/-- HR: --  Site: --  Mode: --
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
## 2021-11-11 RX ADMIN — Medication 2 MILLIGRAM(S): at 21:04

## 2021-11-11 RX ADMIN — Medication 2 MILLIGRAM(S): at 08:23

## 2021-11-11 RX ADMIN — LITHIUM CARBONATE 900 MILLIGRAM(S): 300 TABLET, EXTENDED RELEASE ORAL at 18:06

## 2021-11-11 RX ADMIN — LITHIUM CARBONATE 450 MILLIGRAM(S): 300 TABLET, EXTENDED RELEASE ORAL at 08:23
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
## 2021-11-11 NOTE — BH TREATMENT PLAN - NSTXVIOLNTINTERPR_PSY_ALL_CORE
Patient’s psychiatric rehabilitation goal is to meet with staff individually and attend psychiatric rehabilitation groups, in order for patient to demonstrate 2 problem solving strategies to decrease aggressive behavior for better symptom management and sustained recovery within 7 days.
Pt will identify 2 Problem solving strategies to decrease violent behaviors.
Pt will continue to work on strategies to be less violent/aggressive.

## 2021-11-11 NOTE — BH INPATIENT PSYCHIATRY PROGRESS NOTE - NSBHCHARTREVIEWVS_PSY_A_CORE FT
Vital Signs Last 24 Hrs  T(C): 36.8 (11-11-21 @ 07:47), Max: 36.8 (11-11-21 @ 07:47)  T(F): 98.2 (11-11-21 @ 07:47), Max: 98.2 (11-11-21 @ 07:47)  HR: 83 (11-11-21 @ 07:47) (83 - 83)  BP: 118/71 (11-11-21 @ 07:47) (118/71 - 118/71)  BP(mean): --  RR: --  SpO2: --    Orthostatic VS  11-10-21 @ 20:59  Lying BP: --/-- HR: --  Sitting BP: 122/64 HR: 93  Standing BP: --/-- HR: --  Site: --  Mode: --
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
## 2021-11-11 NOTE — BH TREATMENT PLAN - NSTXVIOLNTINTERRN_PSY_ALL_CORE
Monitor mood and behavior. Observe for signs of aggressive/ violent behavior. Provide staff support, redirection and prn as needed.
Assess mood and behavior.  Support and reassurance. Administer PRN as needed.

## 2021-11-12 RX ADMIN — LITHIUM CARBONATE 900 MILLIGRAM(S): 300 TABLET, EXTENDED RELEASE ORAL at 17:19

## 2021-11-12 RX ADMIN — Medication 2 MILLIGRAM(S): at 20:31

## 2021-11-12 RX ADMIN — Medication 2 MILLIGRAM(S): at 09:20

## 2021-11-12 RX ADMIN — LITHIUM CARBONATE 450 MILLIGRAM(S): 300 TABLET, EXTENDED RELEASE ORAL at 09:05
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
## 2021-11-12 NOTE — BH INPATIENT PSYCHIATRY PROGRESS NOTE - NSBHFUPINTERVALHXFT_PSY_A_CORE
Chart reviewed and case discussed with treatment team. No events reported overnight. Sleep and appetite is fair. Patient remains irritable and paranoid. He has poor insight, stated that he was admitted for no reason and the [facility] just came into his home to bring him here. Patient continues to refuse the Zyprexa despite encouragement. TOO is being pursued. Patient also appears internally preoccupied but denies any AH. Patient is compliant with all other medications, no adverse effects reported.

## 2021-11-12 NOTE — BH INPATIENT PSYCHIATRY PROGRESS NOTE - NSBHCHARTREVIEWVS_PSY_A_CORE FT
Vital Signs Last 24 Hrs  T(C): 36.5 (11-12-21 @ 08:55), Max: 36.5 (11-12-21 @ 08:55)  T(F): 97.7 (11-12-21 @ 08:55), Max: 97.7 (11-12-21 @ 08:55)  HR: 99 (11-12-21 @ 08:55) (77 - 99)  BP: 124/86 (11-12-21 @ 08:55) (107/65 - 124/86)  BP(mean): --  RR: --  SpO2: --    Orthostatic VS  11-10-21 @ 20:59  Lying BP: --/-- HR: --  Sitting BP: 122/64 HR: 93  Standing BP: --/-- HR: --  Site: --  Mode: --

## 2021-11-13 LAB — LITHIUM SERPL-MCNC: 0.8 MMOL/L — SIGNIFICANT CHANGE UP (ref 0.6–1.2)

## 2021-11-13 PROCEDURE — 99232 SBSQ HOSP IP/OBS MODERATE 35: CPT

## 2021-11-13 RX ADMIN — Medication 2 MILLIGRAM(S): at 10:01

## 2021-11-13 RX ADMIN — Medication 650 MILLIGRAM(S): at 10:05

## 2021-11-13 RX ADMIN — Medication 2 MILLIGRAM(S): at 20:45

## 2021-11-13 RX ADMIN — LITHIUM CARBONATE 450 MILLIGRAM(S): 300 TABLET, EXTENDED RELEASE ORAL at 10:01

## 2021-11-13 RX ADMIN — LITHIUM CARBONATE 900 MILLIGRAM(S): 300 TABLET, EXTENDED RELEASE ORAL at 17:20

## 2021-11-13 NOTE — BH INPATIENT PSYCHIATRY PROGRESS NOTE - NSBHCHARTREVIEWVS_PSY_A_CORE FT
Vital Signs Last 24 Hrs  T(C): 36.2 (11-13-21 @ 09:29), Max: 36.7 (11-12-21 @ 20:46)  T(F): 97.1 (11-13-21 @ 09:29), Max: 98.1 (11-12-21 @ 20:46)  HR: 96 (11-12-21 @ 20:46) (96 - 96)  BP: 116/61 (11-13-21 @ 09:29) (116/61 - 140/81)  BP(mean): 80 (11-13-21 @ 09:29) (80 - 80)  RR: --  SpO2: --
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
## 2021-11-13 NOTE — BH INPATIENT PSYCHIATRY PROGRESS NOTE - NSBHFUPINTERVALHXFT_PSY_A_CORE
Chart reviewed and case discussed with treatment team. No events reported overnight. Sleep and appetite is fair. Patient has been staying in behavioral control and is focused on discharge. He is out on the unit and social with select peers. TOO is being pursued. Patient remains in tenuous control and appears internally preoccupied but denies any AH. Patient is compliant with all other medications, no adverse effects reported.

## 2021-11-14 PROCEDURE — 99231 SBSQ HOSP IP/OBS SF/LOW 25: CPT

## 2021-11-14 RX ADMIN — LITHIUM CARBONATE 450 MILLIGRAM(S): 300 TABLET, EXTENDED RELEASE ORAL at 08:49

## 2021-11-14 RX ADMIN — Medication 650 MILLIGRAM(S): at 20:07

## 2021-11-14 RX ADMIN — Medication 2 MILLIGRAM(S): at 08:49

## 2021-11-14 RX ADMIN — LITHIUM CARBONATE 900 MILLIGRAM(S): 300 TABLET, EXTENDED RELEASE ORAL at 17:41

## 2021-11-14 RX ADMIN — Medication 2 MILLIGRAM(S): at 20:24
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
## 2021-11-14 NOTE — BH INPATIENT PSYCHIATRY PROGRESS NOTE - NSBHFUPINTERVALHXFT_PSY_A_CORE
Chart reviewed and case discussed with treatment team. No events reported overnight. Sleep and appetite is fair. Patient has been staying in tenuous behavioral control and is focused on discharge. He is out on the unit and social with select peers. No paranoia elicited but appears internally preoccupied. TOO is being pursued. Patient is compliant with all other medications, no adverse effects reported.     no

## 2021-11-14 NOTE — BH INPATIENT PSYCHIATRY PROGRESS NOTE - NSBHCHARTREVIEWVS_PSY_A_CORE FT
Vital Signs Last 24 Hrs  T(C): 36.1 (11-14-21 @ 08:05), Max: 36.7 (11-13-21 @ 20:06)  T(F): 96.9 (11-14-21 @ 08:05), Max: 98 (11-13-21 @ 20:06)  HR: 88 (11-14-21 @ 08:05) (84 - 88)  BP: 120/80 (11-14-21 @ 08:05) (120/80 - 129/56)  BP(mean): --  RR: --  SpO2: --

## 2021-11-14 NOTE — BH INPATIENT PSYCHIATRY PROGRESS NOTE - OTHER
AH suspected 
Dressed in a hospital gown
AH suspected 
suspect AH
Dressed in a hospital gown
Dressed in a hospital gown

## 2021-11-15 PROCEDURE — 99232 SBSQ HOSP IP/OBS MODERATE 35: CPT

## 2021-11-15 RX ADMIN — Medication 2 MILLIGRAM(S): at 20:12

## 2021-11-15 RX ADMIN — OLANZAPINE 5 MILLIGRAM(S): 15 TABLET, FILM COATED ORAL at 20:11

## 2021-11-15 RX ADMIN — LITHIUM CARBONATE 900 MILLIGRAM(S): 300 TABLET, EXTENDED RELEASE ORAL at 17:58

## 2021-11-15 RX ADMIN — Medication 2 MILLIGRAM(S): at 08:33

## 2021-11-15 RX ADMIN — LITHIUM CARBONATE 450 MILLIGRAM(S): 300 TABLET, EXTENDED RELEASE ORAL at 08:33

## 2021-11-15 NOTE — BH INPATIENT PSYCHIATRY PROGRESS NOTE - NSBHFUPINTERVALHXFT_PSY_A_CORE
Chart reviewed, including vital signs, medication administration record, lab results, and nursing notes.   Case discussed with nursing, psychology, psych rehab, and social work in team meeting.     Patient is seen in the group room, in no acute distress, amenable to interview. He is wearing a jacket over hospital gowns. 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 He continues to question the reasons for his hospitalization. However, he reports that lithium has been effective at controlling his aggression. He acknowledges the plan for court tomorrow. He continues to be resistant to all antipsychotic medication, because they “mess his body up“. Ultimately, he agrees to take Zyprexa 5 mg.

## 2021-11-15 NOTE — BH INPATIENT PSYCHIATRY PROGRESS NOTE - NSBHCHARTREVIEWVS_PSY_A_CORE FT
Vital Signs Last 24 Hrs  T(C): 36.4 (11-15-21 @ 08:07), Max: 36.8 (11-14-21 @ 20:44)  T(F): 97.5 (11-15-21 @ 08:07), Max: 98.2 (11-14-21 @ 20:44)  HR: 82 (11-15-21 @ 08:07) (82 - 82)  BP: 114/82 (11-15-21 @ 08:07) (114/82 - 114/82)  BP(mean): --  RR: --  SpO2: --    Orthostatic VS  11-14-21 @ 20:44  Lying BP: --/-- HR: --  Sitting BP: --/-- HR: --  Standing BP: 146/91 HR: 90  Site: --  Mode: --
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
## 2021-11-16 PROCEDURE — 90853 GROUP PSYCHOTHERAPY: CPT

## 2021-11-16 PROCEDURE — 99232 SBSQ HOSP IP/OBS MODERATE 35: CPT | Mod: 25

## 2021-11-16 RX ADMIN — Medication 2 MILLIGRAM(S): at 08:39

## 2021-11-16 RX ADMIN — OLANZAPINE 5 MILLIGRAM(S): 15 TABLET, FILM COATED ORAL at 20:32

## 2021-11-16 RX ADMIN — LITHIUM CARBONATE 900 MILLIGRAM(S): 300 TABLET, EXTENDED RELEASE ORAL at 17:32

## 2021-11-16 RX ADMIN — LITHIUM CARBONATE 450 MILLIGRAM(S): 300 TABLET, EXTENDED RELEASE ORAL at 08:39

## 2021-11-16 RX ADMIN — Medication 2 MILLIGRAM(S): at 20:11

## 2021-11-16 RX ADMIN — HALOPERIDOL DECANOATE 5 MILLIGRAM(S): 100 INJECTION INTRAMUSCULAR at 09:28
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
## 2021-11-16 NOTE — BH INPATIENT PSYCHIATRY PROGRESS NOTE - NSBHCHARTREVIEWVS_PSY_A_CORE FT
Vital Signs Last 24 Hrs  T(C): 36.5 (11-16-21 @ 08:07), Max: 36.8 (11-15-21 @ 20:31)  T(F): 97.7 (11-16-21 @ 08:07), Max: 98.2 (11-15-21 @ 20:31)  HR: 74 (11-16-21 @ 08:07) (74 - 74)  BP: 106/78 (11-16-21 @ 08:07) (106/78 - 106/78)  BP(mean): --  RR: --  SpO2: --    Orthostatic VS  11-15-21 @ 20:31  Lying BP: --/-- HR: --  Sitting BP: 150/95 HR: 86  Standing BP: --/-- HR: --  Site: --  Mode: --  Orthostatic VS  11-14-21 @ 20:44  Lying BP: --/-- HR: --  Sitting BP: --/-- HR: --  Standing BP: 146/91 HR: 90  Site: --  Mode: --

## 2021-11-16 NOTE — BH INPATIENT PSYCHIATRY PROGRESS NOTE - NSBHFUPINTERVALHXFT_PSY_A_CORE
Chart reviewed, including vital signs, medication administration record, lab results, and nursing notes.   Case discussed with nursing, psychology, psych rehab, and social work in team meeting.     Patient is seen in the day room, in no acute distress, accompanied by a mental health worker. He acknowledges taking Zyprexa last night, and states that this medication made him sleep longer. Denies any other side effects. Reports stable sleep and appetite. Denies SI/HI/AVH. Acknowledges plan to go to court today for medication over objection. No paranoia or delusional thought content elicited.

## 2021-11-17 PROCEDURE — 99233 SBSQ HOSP IP/OBS HIGH 50: CPT

## 2021-11-17 RX ADMIN — Medication 1 MILLIGRAM(S): at 20:15

## 2021-11-17 RX ADMIN — Medication 2 MILLIGRAM(S): at 10:01

## 2021-11-17 RX ADMIN — LITHIUM CARBONATE 900 MILLIGRAM(S): 300 TABLET, EXTENDED RELEASE ORAL at 17:48

## 2021-11-17 RX ADMIN — OLANZAPINE 5 MILLIGRAM(S): 15 TABLET, FILM COATED ORAL at 20:42

## 2021-11-17 RX ADMIN — LITHIUM CARBONATE 450 MILLIGRAM(S): 300 TABLET, EXTENDED RELEASE ORAL at 10:01

## 2021-11-17 NOTE — BH INPATIENT PSYCHIATRY PROGRESS NOTE - NSBHCHARTREVIEWVS_PSY_A_CORE FT
Vital Signs Last 24 Hrs  T(C): 36.7 (11-17-21 @ 08:41), Max: 36.7 (11-17-21 @ 08:41)  T(F): 98.1 (11-17-21 @ 08:41), Max: 98.1 (11-17-21 @ 08:41)  HR: 98 (11-17-21 @ 08:41) (98 - 98)  BP: 91/78 (11-17-21 @ 08:41) (91/78 - 91/78)  BP(mean): --  RR: --  SpO2: --    Orthostatic VS  11-15-21 @ 20:31  Lying BP: --/-- HR: --  Sitting BP: 150/95 HR: 86  Standing BP: --/-- HR: --  Site: --  Mode: --
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
## 2021-11-17 NOTE — BH INPATIENT PSYCHIATRY PROGRESS NOTE - NSBHFUPINTERVALHXFT_PSY_A_CORE
Chart reviewed, including vital signs, medication administration record, lab results, and nursing notes.   Case discussed with nursing, psychology, psych rehab, and social work in team meeting.     Patient is seen in the group room, in no acute distress, amenable to interview. He acknowledges that court was adjourned yesterday until next week, but states that he has been compliant with medication. He continues to exhibit poor insight into his condition. States that he “did nothing, and the police were making up stories“. States that he does not want a psychiatrist, and that antipsychotic medication keep him from thinking straight. States that he has felt cloudy since taking Zyprexa, so we discussed the plan to reduce the dose of Ativan. He does agree to continue taking lithium, because it is not an antipsychotic. He becomes irritable when he has told that there is no discharge date yet, and he leaves the interview prematurely.

## 2021-11-18 PROCEDURE — 90853 GROUP PSYCHOTHERAPY: CPT

## 2021-11-18 PROCEDURE — 99232 SBSQ HOSP IP/OBS MODERATE 35: CPT | Mod: 25

## 2021-11-18 RX ADMIN — LITHIUM CARBONATE 450 MILLIGRAM(S): 300 TABLET, EXTENDED RELEASE ORAL at 08:55

## 2021-11-18 RX ADMIN — LITHIUM CARBONATE 900 MILLIGRAM(S): 300 TABLET, EXTENDED RELEASE ORAL at 17:40

## 2021-11-18 RX ADMIN — Medication 1 MILLIGRAM(S): at 20:05

## 2021-11-18 RX ADMIN — Medication 650 MILLIGRAM(S): at 20:05

## 2021-11-18 RX ADMIN — Medication 1 MILLIGRAM(S): at 08:55

## 2021-11-18 RX ADMIN — OLANZAPINE 5 MILLIGRAM(S): 15 TABLET, FILM COATED ORAL at 20:06

## 2021-11-18 NOTE — BH PSYCHOLOGY - GROUP THERAPY NOTE - NSBHPTASSESSDT_PSY_A_CORE
16-Nov-2021 11:00
11-Nov-2021 11:00
18-Nov-2021 11:00
12-Nov-2021 11:15
10-Nov-2021 09:15
09-Nov-2021 11:00

## 2021-11-18 NOTE — BH PSYCHOLOGY - GROUP THERAPY NOTE - NSPSYCHOLGRPBILLING_PSY_A_CORE
[id number] - Group Psychotherapy
[id number] - Group Psychotherapy
[id number] - Group Psychotherapy
[id number] - Group Psychotherapy
[id number] - Group Psychotherapy
[id number] - Group Psychotherapy

## 2021-11-18 NOTE — BH PSYCHOLOGY - GROUP THERAPY NOTE - NSBHPSYCHOLPARTICIP_PSY_A_CORE
Partially engaged
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
## 2021-11-18 NOTE — BH PSYCHOLOGY - GROUP THERAPY NOTE - NSBHPSYCHOLRESPONSE_PSY_A_CORE
Accepted support

## 2021-11-18 NOTE — BH INPATIENT PSYCHIATRY PROGRESS NOTE - NSBHCHARTREVIEWVS_PSY_A_CORE FT
Vital Signs Last 24 Hrs  T(C): 36.6 (11-18-21 @ 06:25), Max: 36.6 (11-17-21 @ 20:50)  T(F): 97.8 (11-18-21 @ 06:25), Max: 97.8 (11-17-21 @ 20:50)  HR: 100 (11-18-21 @ 13:00) (73 - 100)  BP: 143/75 (11-18-21 @ 13:00) (119/67 - 143/75)  BP(mean): --  RR: 17 (11-18-21 @ 08:00) (17 - 17)  SpO2: --

## 2021-11-18 NOTE — BH PSYCHOLOGY - GROUP THERAPY NOTE - NSPSYCHOLGRPDBTPT_PSY_A_CORE FT
DBT Group is a group in which patients learn skills to better manage their emotions and behaviors. Group begins with a mindfulness practice so that patients have an opportunity to practice observing their internal and external experiences.  Following the mindfulness exercise the group learns new skills in a didactic format. Group today focused on the “distress tolerance” module, which are skills to help patients manage their crisis urges.  Specifically, pts learned the skill of “radical acceptance,” which includes accepting painful situations in their lives they cannot change through turning the mind and practicing willingness. Patients were asked to determine difficult situations in their lives they needed to work on accepting, and provided examples of ways to practice this while in the hospital. 
DBT Group is a group in which patients learn skills to better manage their emotions and behaviors. Group begins with a mindfulness practice so that patients have an opportunity to practice observing their internal and external experiences. Following the mindfulness exercise the group learns new skills in a didactic format. Group today focused on the “distress tolerance” module, which are skills to help patients manage their crisis urges. Specifically, patients learned “TIPP” skills, which are skills to use when patients are highly distressed (at least 70/100) and are unable to think effectively to use other skills. These skills involve “Tipping” body chemistry through using temperature, intense exercise, paced breathing and progressive muscle relaxation. Each skill was reviewed and patients practiced progressive muscle relaxation and paced breathing in the session. Patients were encouraged to practice these skills while on the unit.
DBT skills generalization group is a group in which patients review the skill taught the day before, and patients have the opportunity to troubleshoot the skill, engage in more practice, and share their experience using the skill. Today’s skills review group focused on the skills of “radical acceptance” and "willingness" which include accepting painful situations in their lives they cannot change through turning the mind and practicing engaging in reality effectively. Patients were asked to determine difficult situations in their lives they needed to work on accepting, and provided examples of ways to practice this while in the hospital.  
DBT Group is a group in which patients learn skills to better manage their emotions and behaviors. Group begins with a mindfulness practice so that patients have an opportunity to practice observing their internal and external experiences.  Following the mindfulness exercise the group learns new skills in a didactic format. Group today focused on the “emotion regulation” module.  Specifically, patients learned the skills of “PLEASE,” or taking care of the mind by taking care of the body. This skill involves maintaining consistent treatment for physical illness, balanced eating, avoidance of mood-altering substances, balanced sleep, and exercise.  provided examples and suggestions of ways to improve these areas, and patients were encouraged to engage in discussion of ways they can prioritize and implement this skill.
DBT Group is a group in which patients learn skills to better manage their emotions and behaviors. Group begins with a mindfulness practice so that patients have an opportunity to practice observing their internal and external experiences. Following the mindfulness exercise the group learns new skills in a didactic format. Group today focused on the “distress tolerance” module. Specifically, patients learned the “IMPROVE” skill which involves using mental techniques to lower distress. These techniques include using imagery, making meaning, using prayer, focusing on one thing at a time, taking a vacation, and using encouragement. Patients practiced some of these skills in session as they were guided through a relaxation and grounding exercise.
DBT Group is a group in which patients learn skills to better manage their emotions and behaviors. Group begins with a mindfulness practice so that patients have an opportunity to practice observing their internal and external experiences.  Following the mindfulness exercise the group learns new skills in a didactic format. Group today focused on the “distress tolerance” module, which are skills to help patients manage their crisis urges.  Specifically, pts learned the skill of “radical acceptance,” which includes accepting painful situations in their lives they cannot change through turning the mind and practicing willingness. Patients were asked to determine difficult situations in their lives they needed to work on accepting, and provided examples of ways to practice this while in the hospital.

## 2021-11-18 NOTE — BH PSYCHOLOGY - GROUP THERAPY NOTE - NSPSYCHOLGRPDBTPROB_PSY_A_CORE
irritability/labile affect/psychotic episodes

## 2021-11-18 NOTE — BH PSYCHOLOGY - GROUP THERAPY NOTE - NSPSYCHOLGRPDBTGOAL_PSY_A_CORE
reduce mood and affective lability/reduce impulsive self-defeating behavior/improve ability to indentify feelings/improve ability to communicate feelings/reduce vulnerability to emotional dysregualation/promote skills to reduce anger

## 2021-11-18 NOTE — BH PSYCHOLOGY - GROUP THERAPY NOTE - NSPSYCHOLGRPDBTPT_PSY_A_CORE
labile mood and affect in group/other...
labile mood and affect in group/other...
stable mood and affect in group/no self-destructive impulses/behaviors/other...
labile mood and affect in group/other...
stable mood and affect in group/no self-destructive impulses/behaviors/other...
stable mood and affect in group/no self-destructive impulses/behaviors/other...

## 2021-11-18 NOTE — BH PSYCHOLOGY - GROUP THERAPY NOTE - NSPSYCHOLGRPDBTINT_PSY_A_CORE FT
taught distress tolerance skill 
taught emotion regulation skill 
taught distress tolerance skill
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
## 2021-11-19 LAB — TSH SERPL-MCNC: 4.09 UIU/ML — SIGNIFICANT CHANGE UP (ref 0.27–4.2)

## 2021-11-19 PROCEDURE — 99233 SBSQ HOSP IP/OBS HIGH 50: CPT

## 2021-11-19 RX ORDER — DIPHENHYDRAMINE HCL 50 MG
50 CAPSULE ORAL ONCE
Refills: 0 | Status: COMPLETED | OUTPATIENT
Start: 2021-11-19 | End: 2021-11-19

## 2021-11-19 RX ADMIN — Medication 50 MILLIGRAM(S): at 23:04

## 2021-11-19 RX ADMIN — LITHIUM CARBONATE 900 MILLIGRAM(S): 300 TABLET, EXTENDED RELEASE ORAL at 21:05

## 2021-11-19 RX ADMIN — Medication 0.5 MILLIGRAM(S): at 21:05

## 2021-11-19 RX ADMIN — Medication 1 MILLIGRAM(S): at 08:54

## 2021-11-19 RX ADMIN — LITHIUM CARBONATE 450 MILLIGRAM(S): 300 TABLET, EXTENDED RELEASE ORAL at 08:54

## 2021-11-19 RX ADMIN — Medication 650 MILLIGRAM(S): at 00:15

## 2021-11-19 RX ADMIN — OLANZAPINE 5 MILLIGRAM(S): 15 TABLET, FILM COATED ORAL at 21:05

## 2021-11-19 NOTE — BH TREATMENT PLAN - NSTXPSYCHOINTERMD_PSY_ALL_CORE
Titrate antipsychotic and BOWENS administration  ACT/AOT application  Medication over objection
Titrate antipsychotic   ACT/AOT application
Titrate antipsychotic
Titrate antipsychotic and BOWENS administration  ACT/AOT application  Medication over objection
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
## 2021-11-19 NOTE — BH TREATMENT PLAN - NSDCCRITERIA_PSY_ALL_CORE
patient will be less psychotic and no longer homicidal
Parent(s)

## 2021-11-19 NOTE — BH TREATMENT PLAN - NSTXPATIENTPARTICIPATE_PSY_ALL_CORE
No, patient unwilling to participate
Patient participated in identification of needs/problems/goals for treatment

## 2021-11-19 NOTE — BH INPATIENT PSYCHIATRY PROGRESS NOTE - NSBHCHARTREVIEWVS_PSY_A_CORE FT
Vital Signs Last 24 Hrs  T(C): 36.4 (11-19-21 @ 06:50), Max: 36.8 (11-18-21 @ 20:46)  T(F): 97.6 (11-19-21 @ 06:50), Max: 98.3 (11-18-21 @ 20:46)  HR: 86 (11-19-21 @ 06:50) (86 - 90)  BP: 126/73 (11-19-21 @ 06:50) (126/73 - 149/97)  BP(mean): --  RR: --  SpO2: --
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
## 2021-11-19 NOTE — BH INPATIENT PSYCHIATRY PROGRESS NOTE - NSBHFUPINTERVALHXFT_PSY_A_CORE
Chart reviewed, including vital signs, medication administration record, lab results, and nursing notes.   Case discussed with nursing, psychology, psych rehab, and social work in team meeting.     Patient is seen in the group room, in no acute distress, amenable to interview. He declines to have an ACT team, because he states that he does not want people visiting his house. He continues to demand discharge, and states that he just needs to be left alone after leaving. Still no insight into his condition. Reports continued dry skin. Reports stable sleep and appetite. Denies SI/HI/AVH.

## 2021-11-19 NOTE — BH TREATMENT PLAN - NSTXVIOLNTINTERMD_PSY_ALL_CORE
Titrate antipsychotic and BOWENS administration  ACT/AOT application  Medication over objection
Titrate antipsychotic
Titrate antipsychotic   ACT/AOT application
Titrate antipsychotic and BOWENS administration  ACT/AOT application  Medication over objection

## 2021-11-19 NOTE — BH TREATMENT PLAN - NSTXVIOLNTGOAL_PSY_ALL_CORE
Will communicate an angry feeling in a controlled manner
Will demonstrate 2 problem solving strategies to decrease aggressive behavior

## 2021-11-19 NOTE — BH TREATMENT PLAN - NSTXPROBVIOLNT_PSY_ALL_CORE
VIOLENT/AGGRESSIVE BEHAVIOR

## 2021-11-19 NOTE — BH TREATMENT PLAN - NSTXPLANTHERAPYSESSIONSFT_PSY_ALL_CORE
10-30-21  Type of therapy: Leisure development,Peer advocate  Type of session: Individual  Level of patient participation: Participated with encouragement  Duration of participation: 20 minutes  Therapy conducted by: Psych rehab  Therapy Summary: Writer met with patient for an individual session in order to review progress towards psychiatric rehabilitation goals. Patient was verbal and cooperative during session. Patient is minimally engaged in unit activities. Patient was not a behavioral management issue during past 7 days.     Patient has attended approximately 40 percent of psychiatric rehabilitation groups over the past seven days. Patient has demonstrated limited improvement in mood. Patient continues to be resistant to treatment. Patient reports he does not like psychiatrist. Patient then reports he does not like his doctor. 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 Patient denies SI/HI. Patient reports people are "messing" with him. Patient reports he has never been better. Patient denies all symptoms. Patient continues to be discharged focus. Writer explored coping skills with patient. Patient reports he works. Patient was unable to mention coping skills at this momenet, and reports he is good. Writer encouraged patient to explore and utilize effective coping skills. Patient was somewhat receptive. Patient reports he does not want to take medication, and reports he will continue to refuse "the shot". Writer provided psychoeducation. Patient was resistant. Patient is visible on the unit.    11-04-21  Type of therapy: Anger management,Leisure development,Medication management,Psychoeducation,Stress management,Symptom management  Type of session: Individual  Level of patient participation: Resistance to participation  Duration of participation: 30 minutes  Therapy conducted by: Psych rehab  Therapy Summary: Writer met with patient for an individual session in order to review progress towards psychiatric rehabilitation goals. Patient was irritable, verbally abusive to his doctor and staff, and agitated during session. Pt was uncooperative and is refusing to take his medication regimen. Patient is minimally engaged in unit activities. Patient was a behavioral management issue during past 7 days and was destroying property. Patient has not attended psychiatric rehabilitation groups over the past seven days. Patient has demonstrated limited improvement in mood. Patient continues to be resistant to treatment. Patient reports he does not like psychiatrists. Patient then reports he does not like his doctor. 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 Patient denies SI/HI. Patient reports people are "messing" with him. Patient reports he has never been better. Patient denies all symptoms. Patient continues to be discharged focus. Writer explored coping skills with patient. Patient was unable to mention coping skills at this moment, and reports he is good. Writer encouraged patient to explore and utilize effective coping skills. Patient was not receptive. Patient reports he does not want to take medication and reports he will continue to refuse the liquid Thorazine. Writer provided psychoeducation. Patient was resistant. Patient is visible on the unit.  
  11-18-21  Type of therapy: Dialectical behavior therapy,Coping skills,Mindfulness,Music therapy,Stress management,Symptom management  Type of session: Individual  Level of patient participation: Participated with encouragement  Duration of participation: 30 minutes  Therapy conducted by: Psych rehab  Therapy Summary: Writer met with patient for an individual session in order to review progress towards psychiatric rehabilitation goals. Pt is on CO1:1 but wants to change it as he has improved his behavioral control. Pt is in better mood and pleasant for the most part the past few days. Pt is more cooperative with his medication regimen. Patient is more engaged in unit activities. Patient was not behaviorally aggressive during past 7 days and was not destroying property. Patient some attended psychiatric rehabilitation groups increasingly over the past seven days. Patient has demonstrated some improvement in mood. Patient is less resistant to treatment.  Patient denies SI/HI. Patient denies all symptoms. Patient continues to be discharged focus. Writer explored coping skills with patient. Patient was unable to mention coping skills at this moment, and reports he is good. Writer encouraged patient to explore and utilize effective coping skills. Patient was more receptive. Writer provided psychoeducation. Patient is more visible on the unit and engaged in milieu therapy, board games, basket ball and socialization.  
  11-11-21  Type of therapy: Anger management,Cognitive behavior therapy,Medication management,Music therapy,Stress management,Symptom management  Type of session: Individual  Level of patient participation: Participated with encouragement  Duration of participation: 30 minutes  Therapy conducted by: Psych rehab  Therapy Summary: Writer met with patient for an individual session in order to review progress towards psychiatric rehabilitation goals. Pt is on CO1:1. Pt wrote a letter stating he will destroy the building. Patient is less irritable, verbally abusive to his doctor and staff, and agitated during sessions. Pt is more cooperative with his medication regimen. Patient is minimally engaged in unit activities. Patient was not behaviorally aggressive during past 7 days and was destroying property. Patient has not attended psychiatric rehabilitation groups over the past seven days. Patient has demonstrated some improvement in mood. Patient continues to be resistant to treatment. Patient reports he does not like psychiatrists. Patient then reports he does not like his doctor and all psychiatrists. Patient denies SI/HI. Patient reports people are "messing" with him. Patient reports he has never been better. Patient denies all symptoms. Patient continues to be discharged focus. Writer explored coping skills with patient. Patient was unable to mention coping skills at this moment, and reports he is good. Writer encouraged patient to explore and utilize effective coping skills. Patient was not receptive. Patient reports he does not want to take medication and reports he will continue to refuse the liquid Thorazine. Writer provided psychoeducation. Patient was resistant. Patient is visible on the unit.

## 2021-11-19 NOTE — BH TREATMENT PLAN - NSCMSPTSTRENGTHS_PSY_ALL_CORE
Supportive family
Expressive of emotions/Intact family/Self confidence/Supportive family
Supportive family
Positive attitude/Supportive family

## 2021-11-19 NOTE — BH TREATMENT PLAN - NSTXPSYCHOGOAL_PSY_ALL_CORE
Will identify 1 trigger/stressor that exacerbates thoughts
Will be able to report experiencing hallucinations to staff
Will identify 1 trigger/stressor that exacerbates thoughts
Will identify 2 coping skills that help mitigate hallucinations

## 2021-11-19 NOTE — BH TREATMENT PLAN - NSBHPRIMARYDX_PSY_ALL_CORE
Paranoid schizophrenia    

## 2021-11-20 RX ORDER — ATROPINE SULFATE 1 %
2 DROPS OPHTHALMIC (EYE)
Refills: 0 | Status: DISCONTINUED | OUTPATIENT
Start: 2021-11-20 | End: 2021-11-23

## 2021-11-20 RX ADMIN — Medication 650 MILLIGRAM(S): at 19:50

## 2021-11-20 RX ADMIN — Medication 2 DROP(S): at 13:31

## 2021-11-20 RX ADMIN — LITHIUM CARBONATE 450 MILLIGRAM(S): 300 TABLET, EXTENDED RELEASE ORAL at 09:29

## 2021-11-20 RX ADMIN — Medication 0.5 MILLIGRAM(S): at 09:29

## 2021-11-20 RX ADMIN — Medication 650 MILLIGRAM(S): at 13:32

## 2021-11-20 RX ADMIN — OLANZAPINE 5 MILLIGRAM(S): 15 TABLET, FILM COATED ORAL at 21:07

## 2021-11-20 RX ADMIN — Medication 0.5 MILLIGRAM(S): at 21:07

## 2021-11-20 RX ADMIN — LITHIUM CARBONATE 900 MILLIGRAM(S): 300 TABLET, EXTENDED RELEASE ORAL at 17:34

## 2021-11-21 RX ADMIN — Medication 0.5 MILLIGRAM(S): at 21:00

## 2021-11-21 RX ADMIN — Medication 0.5 MILLIGRAM(S): at 08:52

## 2021-11-21 RX ADMIN — OLANZAPINE 5 MILLIGRAM(S): 15 TABLET, FILM COATED ORAL at 21:00

## 2021-11-21 RX ADMIN — LITHIUM CARBONATE 450 MILLIGRAM(S): 300 TABLET, EXTENDED RELEASE ORAL at 08:52

## 2021-11-21 RX ADMIN — LITHIUM CARBONATE 900 MILLIGRAM(S): 300 TABLET, EXTENDED RELEASE ORAL at 17:13

## 2021-11-22 PROCEDURE — 99232 SBSQ HOSP IP/OBS MODERATE 35: CPT

## 2021-11-22 RX ADMIN — LITHIUM CARBONATE 900 MILLIGRAM(S): 300 TABLET, EXTENDED RELEASE ORAL at 17:44

## 2021-11-22 RX ADMIN — LITHIUM CARBONATE 450 MILLIGRAM(S): 300 TABLET, EXTENDED RELEASE ORAL at 08:57

## 2021-11-22 RX ADMIN — Medication 2 DROP(S): at 16:50

## 2021-11-22 RX ADMIN — Medication 0.5 MILLIGRAM(S): at 21:23

## 2021-11-22 RX ADMIN — Medication 0.5 MILLIGRAM(S): at 08:57

## 2021-11-22 RX ADMIN — OLANZAPINE 5 MILLIGRAM(S): 15 TABLET, FILM COATED ORAL at 21:24

## 2021-11-22 NOTE — BH INPATIENT PSYCHIATRY PROGRESS NOTE - NSBHCHARTREVIEWVS_PSY_A_CORE FT
Vital Signs Last 24 Hrs  T(C): 36.7 (11-22-21 @ 08:09), Max: 36.7 (11-22-21 @ 08:09)  T(F): 98.1 (11-22-21 @ 08:09), Max: 98.1 (11-22-21 @ 08:09)  HR: 77 (11-22-21 @ 08:09) (77 - 78)  BP: 128/64 (11-22-21 @ 08:09) (128/64 - 148/85)  BP(mean): --  RR: --  SpO2: --
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
## 2021-11-22 NOTE — BH INPATIENT PSYCHIATRY PROGRESS NOTE - NSBHFUPINTERVALHXFT_PSY_A_CORE
Chart reviewed, including vital signs, medication administration record, lab results, and nursing notes.   Case discussed with nursing, psychology, psych rehab, and social work in team meeting.     Patient is seen in the group room, in no acute distress, amenable to interview. States that he continues to have some dry skin, which he attributed to Zyprexa. Otherwise denies other issues. Reports stable sleep and appetite. Denies SI/HI/AVH. We discussed the plan to make an ACT team referral. Also discuss the plan to reduce the dose of Ativan at this time. He expresses his disappointment that he will not be discharged before Thanksgiving.

## 2021-11-23 VITALS — DIASTOLIC BLOOD PRESSURE: 90 MMHG | SYSTOLIC BLOOD PRESSURE: 135 MMHG | HEART RATE: 82 BPM

## 2021-11-23 PROCEDURE — 99232 SBSQ HOSP IP/OBS MODERATE 35: CPT

## 2021-11-23 RX ORDER — LITHIUM CARBONATE 300 MG/1
1 TABLET, EXTENDED RELEASE ORAL
Qty: 30 | Refills: 0
Start: 2021-11-23 | End: 2021-12-22

## 2021-11-23 RX ORDER — PROPRANOLOL HCL 160 MG
1 CAPSULE, EXTENDED RELEASE 24HR ORAL
Qty: 90 | Refills: 0
Start: 2021-11-23 | End: 2021-12-22

## 2021-11-23 RX ORDER — LITHIUM CARBONATE 300 MG/1
2 TABLET, EXTENDED RELEASE ORAL
Qty: 60 | Refills: 0
Start: 2021-11-23 | End: 2021-12-22

## 2021-11-23 RX ORDER — OLANZAPINE 15 MG/1
1 TABLET, FILM COATED ORAL
Qty: 30 | Refills: 0
Start: 2021-11-23 | End: 2021-12-22

## 2021-11-23 RX ADMIN — LITHIUM CARBONATE 900 MILLIGRAM(S): 300 TABLET, EXTENDED RELEASE ORAL at 18:22

## 2021-11-23 RX ADMIN — LITHIUM CARBONATE 450 MILLIGRAM(S): 300 TABLET, EXTENDED RELEASE ORAL at 09:12

## 2021-11-23 NOTE — BH INPATIENT PSYCHIATRY PROGRESS NOTE - NSTXPSYCHOINTERMD_PSY_ALL_CORE
Titrate antipsychotic
SGA trial
Titrate antipsychotic and BOWENS administration  ACT/AOT application  Medication over objection
SGA trial
Titrate antipsychotic
Titrate antipsychotic   ACT/AOT application
Titrate antipsychotic   ACT/AOT application
Titrate antipsychotic
Titrate antipsychotic and BOWENS administration  ACT/AOT application  Medication over objection
Titrate antipsychotic and BOWENS administration  ACT/AOT application  Medication over objection
Titrate antipsychotic
Titrate antipsychotic and BOWENS administration  ACT/AOT application  Medication over objection
Titrate antipsychotic
Titrate antipsychotic and BOWENS administration  ACT/AOT application  Medication over objection
Titrate antipsychotic
Titrate antipsychotic and BOWENS administration  ACT/AOT application  Medication over objection
Titrate antipsychotic
Titrate antipsychotic and BOWENS administration  ACT/AOT application  Medication over objection
Titrate antipsychotic
Titrate antipsychotic and BOWENS administration  ACT/AOT application  Medication over objection

## 2021-11-23 NOTE — BH INPATIENT PSYCHIATRY PROGRESS NOTE - NSBHMSEMUSCLE_PSY_A_CORE
Normal muscle tone/strength
Unable to assess
Normal muscle tone/strength

## 2021-11-23 NOTE — BH INPATIENT PSYCHIATRY PROGRESS NOTE - NSBHMSEGROOM_PSY_A_CORE
Good
Fair
Good
Fair
Good
Fair
Good
Fair
Fair
Good
Good
Fair
Fair
Good
Fair
Good
Good
Fair
Good
Fair

## 2021-11-23 NOTE — BH INPATIENT PSYCHIATRY PROGRESS NOTE - NSBHMSEMOVE_PSY_A_CORE
No abnormal movements/Unable to assess
No abnormal movements
No abnormal movements/Unable to assess

## 2021-11-23 NOTE — BH INPATIENT PSYCHIATRY PROGRESS NOTE - NSBHANTIPSYCHOTIC_PSY_ALL_CORE
Yes...

## 2021-11-23 NOTE — BH INPATIENT PSYCHIATRY PROGRESS NOTE - NSBHMSEIMPULSE_PSY_A_CORE
Impaired
Normal
Impaired
Normal
Impaired
Impaired
Unable to assess
Impaired
Normal
Impaired
Normal
Impaired
Impaired
Normal
Impaired
Normal
Impaired
Impaired

## 2021-11-23 NOTE — BH DISCHARGE NOTE NURSING/SOCIAL WORK/PSYCH REHAB - NSCDUDCCRISIS_PSY_A_CORE
[facility]  1 [phone]) [facility] ([phone])  Text "WELL" to 23284  Website: www.[employer]/.[facility] 1 (036) 781-QLHN (3273) Website: www.[facility].org/.National Suicide Prevention Lifeline 1 (138) 856-3068/.  [employer]  1 (562) LIFENET (038-1041)/.  [facility]  [address] [place] [address] [phone]   Hours:  Monday through Friday from 9 AM to 3 PM/.  [facility] [facility] Crisis Line  [phone], Option 1
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
## 2021-11-23 NOTE — PHARMACOTHERAPY INTERVENTION NOTE - COMMENTS
Provided medication education upon discharge. Patient to be discharged on the following medications:     - Lithium  mG tablet, 1 tablet qAM and 2 tablets qHS  - Olanzapine 5 mG tablet, 1 tablet PO qHS  - Propranolol 10 mG tablet, 1 tablet PO TID    Discussed indication, dosing, and potential side effects. Discussed with patient therapeutic range of lithium, including side effects associated with a supra-therapeutic dose. Discussed with patient routine monitoring of kidney function and thyroid tests associated with lithium, as well as routine metabolic monitoring with antipsychotics. Discussed with patient the importance of taking medication consistently daily. Patient provided the opportunity to ask questions. Patient expressed understanding to the above instruction.     Thank you for the opportunity to participate in the care of this patient.     Anna Luong, PharmD, BCPP  11/23/2021
Discussed with primary psychiatrist dosing for IM olanzapine for acute agitation. Dosing recommendation includes 5 to 10 mg; may repeat based on response and tolerability 2 hours after initial dose and 4 hours after second dose; maximum: 30 mg/day (including oral olanzapine).    Anna Luong, PharmD
Discussed with provider patient due for lithium level after 5 days on increased dose of 450 mG in the morning and 900 mG qhs, so due on 11/13/2021 in the morning.
Patient has 2 orders with duplicate indications PRN pain. Patient utilizing acetaminophen PRN pain. Discussed with provider to discontinue sulindac.
Upon interview, patient relays dry skin for a few days. Patient started on lithium therapy during hospital stay. Discussed with psychiatrist obtaining TSH to monitor thyroid function.
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
## 2021-11-23 NOTE — BH INPATIENT PSYCHIATRY PROGRESS NOTE - NSTXDCOPNODATEEST_PSY_ALL_CORE
08-Nov-2021
01-Nov-2021
15-Nov-2021
15-Nov-2021
25-Oct-2021
25-Oct-2021
08-Nov-2021
25-Oct-2021
08-Nov-2021
01-Nov-2021
25-Oct-2021
01-Nov-2021
25-Oct-2021
01-Nov-2021
23-Nov-2021
01-Nov-2021
08-Nov-2021
15-Nov-2021
08-Nov-2021
08-Nov-2021
15-Nov-2021
01-Nov-2021
01-Nov-2021
08-Nov-2021
25-Oct-2021
25-Oct-2021

## 2021-11-23 NOTE — BH INPATIENT PSYCHIATRY PROGRESS NOTE - NSBHMSETHTPROC_PSY_A_CORE
Linear/Illogical/Impaired reasoning
Linear/Illogical/Impaired reasoning
Disorganized
Disorganized
Disorganized/Impaired reasoning
Linear/Illogical/Impaired reasoning
Disorganized/Impaired reasoning
Disorganized
Linear/Impaired reasoning
Disorganized
Linear/Impaired reasoning
Linear/Illogical/Impaired reasoning
Impaired reasoning
Linear/Impaired reasoning
Linear
Linear/Impaired reasoning
Linear/Illogical/Impaired reasoning
Linear/Impaired reasoning
Linear/Unable to assess
Disorganized

## 2021-11-23 NOTE — BH INPATIENT PSYCHIATRY PROGRESS NOTE - NSBHCHARTREVIEWVS_PSY_A_CORE FT
Vital Signs Last 24 Hrs  T(C): 36.7 (11-23-21 @ 07:44), Max: 36.7 (11-23-21 @ 07:44)  T(F): 98 (11-23-21 @ 07:44), Max: 98 (11-23-21 @ 07:44)  HR: 71 (11-23-21 @ 07:44) (71 - 91)  BP: 100/60 (11-23-21 @ 07:44) (100/60 - 152/82)  BP(mean): --  RR: 16 (11-23-21 @ 07:44) (16 - 16)  SpO2: --

## 2021-11-23 NOTE — BH INPATIENT PSYCHIATRY PROGRESS NOTE - NSCGIIMPROVESX_PSY_ALL_CORE
2 = Much improved - notably better with signficant reduction of symptoms; increase in the level of functioning but some symptoms remain
4 = No change - symptoms remain essentially unchanged
5 = Minimally worse - slightly worse but may not be clinically meaningful; may represent very little change in basic clinical status or functional capacity
4 = No change - symptoms remain essentially unchanged
4 = No change - symptoms remain essentially unchanged
2 = Much improved - notably better with signficant reduction of symptoms; increase in the level of functioning but some symptoms remain
4 = No change - symptoms remain essentially unchanged
4 = No change - symptoms remain essentially unchanged
3 = Minimally improved - slightly better with little or no clinically meaningful reduction of symptoms.  Represents very little change in basic clinical status, level of care, or functional capacity.
5 = Minimally worse - slightly worse but may not be clinically meaningful; may represent very little change in basic clinical status or functional capacity
2 = Much improved - notably better with signficant reduction of symptoms; increase in the level of functioning but some symptoms remain
4 = No change - symptoms remain essentially unchanged
3 = Minimally improved - slightly better with little or no clinically meaningful reduction of symptoms.  Represents very little change in basic clinical status, level of care, or functional capacity.
5 = Minimally worse - slightly worse but may not be clinically meaningful; may represent very little change in basic clinical status or functional capacity
4 = No change - symptoms remain essentially unchanged
7 = Very much worse - severe exacerbation of symptoms and loss of functioning
4 = No change - symptoms remain essentially unchanged
7 = Very much worse - severe exacerbation of symptoms and loss of functioning
2 = Much improved - notably better with signficant reduction of symptoms; increase in the level of functioning but some symptoms remain
4 = No change - symptoms remain essentially unchanged
5 = Minimally worse - slightly worse but may not be clinically meaningful; may represent very little change in basic clinical status or functional capacity
5 = Minimally worse - slightly worse but may not be clinically meaningful; may represent very little change in basic clinical status or functional capacity
3 = Minimally improved - slightly better with little or no clinically meaningful reduction of symptoms.  Represents very little change in basic clinical status, level of care, or functional capacity.

## 2021-11-23 NOTE — BH INPATIENT PSYCHIATRY PROGRESS NOTE - NSBHFUPINTERVALCCFT_PSY_A_CORE
"Tell me more about Lithium" 
Patient seen for follow up for psychosis and aggression. 
Patient seen for follow up for psychosis and aggression. 
Patient seen for follow up of psychosis and aggression
"I'm not taking antipsychotics no matter which one you prescribe" 
Patient seen for follow up of psychosis and aggression
Patient seen for follow up for psychosis and aggression. 
psychosis
Patient seen for follow up for psychosis and aggression. 
psychosis
Patient seen for follow up for psychosis and aggression. 
Patient seen for follow up of psychosis and aggression
Patient seen for follow up for psychosis and aggression. 
Patient seen for follow up of psychosis and aggression
Patient seen for follow up for psychosis and aggression. 
Patient seen for follow up of psychosis and aggression
Patient seen for follow up for psychosis and aggression. 
Patient seen for follow up of psychosis and aggression
Patient seen for follow up for psychosis and aggression.

## 2021-11-23 NOTE — BH INPATIENT PSYCHIATRY PROGRESS NOTE - NSBHMSETHTCONTENT_PSY_A_CORE
Delusions
Delusions/Homicidality
Delusions/Preoccupations/Unable to assess
Delusions/Homicidality
Delusions/Preoccupations
Delusions/Preoccupations/Unable to assess
Unremarkable
Delusions
Unremarkable
Delusions/Preoccupations/Unable to assess
Delusions/Homicidality
Delusions/Preoccupations/Unable to assess
Unremarkable
Delusions
Delusions
Unremarkable
Delusions/Preoccupations/Homicidality
Delusions/Preoccupations/Homicidality
Unremarkable
Delusions/Preoccupations/Unable to assess
Unremarkable
Delusions/Preoccupations/Unable to assess

## 2021-11-23 NOTE — BH INPATIENT PSYCHIATRY PROGRESS NOTE - GENERAL APPEARANCE
No deformities present/Other
No deformities present
No deformities present/Other
No deformities present
No deformities present
No deformities present/Other
No deformities present/Other
No deformities present
No deformities present/Other
No deformities present

## 2021-11-23 NOTE — BH INPATIENT PSYCHIATRY PROGRESS NOTE - NSTXDCOPNODATENEW_PSY_ALL_CORE
15-Nov-2021

## 2021-11-23 NOTE — BH INPATIENT PSYCHIATRY PROGRESS NOTE - NSBHINPTBILLING_PSY_ALL_CORE
24923 - Inpatient Moderate Complexity
78349 - Inpatient High Complexity
76918 - Inpatient Moderate Complexity
23997 - Inpatient High Complexity
44057 - Inpatient Low Complexity
44185 - Inpatient Moderate Complexity
14519 - Inpatient Moderate Complexity
38277 - Inpatient High Complexity
20071 - Inpatient Moderate Complexity
65729 - Inpatient Moderate Complexity
46620 - Inpatient Moderate Complexity
50902 - Inpatient Moderate Complexity
21296 - Inpatient Low Complexity
75329 - Inpatient Moderate Complexity
11662 - Inpatient Moderate Complexity
79335 - Inpatient Moderate Complexity
34787 - Inpatient High Complexity
53359 - Inpatient High Complexity
51701 - Inpatient Moderate Complexity
71296 - Inpatient Moderate Complexity
97409 - Inpatient Moderate Complexity
16926 - Inpatient High Complexity
86154 - Inpatient Moderate Complexity
18795 - Inpatient High Complexity
59914 - Inpatient Moderate Complexity
32093 - Inpatient Moderate Complexity
23859 - Inpatient Moderate Complexity
41896 - Inpatient Low Complexity
43607 - Inpatient Moderate Complexity

## 2021-11-23 NOTE — BH INPATIENT PSYCHIATRY PROGRESS NOTE - NSBHMSEEYE_PSY_A_CORE
Good
Good
Poor
Fair
Fair
Good
Fair
Good
Good
Fair
Unable to assess
Fair
Poor
Fair
Good
Fair
Good
Poor
Good

## 2021-11-23 NOTE — BH INPATIENT PSYCHIATRY PROGRESS NOTE - NSBHLEGALSTATUSCHANGE_PSY_ALL_CORE
No
Yes...
No
Yes...
No
No
Yes...
No
Yes...
No
Yes...
No
Yes...
Yes...

## 2021-11-23 NOTE — BH INPATIENT PSYCHIATRY PROGRESS NOTE - NSTXVIOLNTPROGRES_PSY_ALL_CORE
Improving
No Change
No Change
Improving
No Change
Improving
No Change
Improving
No Change
Improving
No Change
Improving
No Change
Improving
No Change
Improving
Improving

## 2021-11-23 NOTE — [facility] INPATIENT PSYCHIATRY PROGRESS NOTE - CURRENT MEDICATION
MEDICATIONS  (STANDING):  propranolol 10 milliGRAM(s) Oral three times a day  risperiDONE   Tablet 2 milliGRAM(s) Oral two times a day    MEDICATIONS  (PRN):  acetaminophen     Tablet .. 650 milliGRAM(s) Oral every 6 hours PRN Temp greater or equal to 38C (100.4F), Mild Pain (1 - 3), Moderate Pain (4 - 6)  diphenhydrAMINE 50 milliGRAM(s) Oral every 6 hours PRN Extrapyramidal prophylaxis  diphenhydrAMINE Injectable 50 milliGRAM(s) IntraMuscular once PRN Extrapyramidal prophylaxis  ibuprofen  Tablet. 400 milliGRAM(s) Oral once PRN Mild Pain (1 - 3), Moderate Pain (4 - 6)  LORazepam     Tablet 2 milliGRAM(s) Oral four times a day PRN Agitation  nicotine  Polacrilex Gum 4 milliGRAM(s) Oral every 2 hours PRN nicotine dependence  OLANZapine Injectable 10 milliGRAM(s) IntraMuscular once PRN severe agitation  risperiDONE  Disintegrating Tablet 2 milliGRAM(s) Oral two times a day PRN agitation  
MEDICATIONS  (STANDING):  chlorproMAZINE    Tablet 50 milliGRAM(s) Oral daily  lithium CR (ESKALITH-CR) 900 milliGRAM(s) Oral at bedtime  LORazepam     Tablet 2 milliGRAM(s) Oral two times a day  propranolol 10 milliGRAM(s) Oral three times a day    MEDICATIONS  (PRN):  acetaminophen     Tablet .. 650 milliGRAM(s) Oral every 6 hours PRN Temp greater or equal to 38C (100.4F), Mild Pain (1 - 3), Moderate Pain (4 - 6)  chlorproMAZINE    Injectable 50 milliGRAM(s) IntraMuscular once PRN combative behavior  chlorproMAZINE    Injectable 50 milliGRAM(s) IntraMuscular once PRN combative behavior  chlorproMAZINE    Tablet 50 milliGRAM(s) Oral every 4 hours PRN agitation  diphenhydrAMINE 50 milliGRAM(s) Oral every 6 hours PRN Extrapyramidal prophylaxis  diphenhydrAMINE Injectable 50 milliGRAM(s) IntraMuscular once PRN Extrapyramidal prophylaxis  ibuprofen  Tablet. 400 milliGRAM(s) Oral once PRN Mild Pain (1 - 3), Moderate Pain (4 - 6)  LORazepam     Tablet 2 milliGRAM(s) Oral four times a day PRN Agitation  nicotine  Polacrilex Gum 4 milliGRAM(s) Oral every 2 hours PRN nicotine dependence  OLANZapine Injectable 20 milliGRAM(s) IntraMuscular every 12 hours PRN agitation  
MEDICATIONS  (STANDING):  lithium CR (ESKALITH-CR) 450 milliGRAM(s) Oral daily  lithium CR (ESKALITH-CR) 900 milliGRAM(s) Oral <User Schedule>  LORazepam     Tablet 0.5 milliGRAM(s) Oral at bedtime  OLANZapine Disintegrating Tablet 5 milliGRAM(s) Oral at bedtime  propranolol 10 milliGRAM(s) Oral three times a day    MEDICATIONS  (PRN):  acetaminophen     Tablet .. 650 milliGRAM(s) Oral every 6 hours PRN Temp greater or equal to 38C (100.4F), Mild Pain (1 - 3), Moderate Pain (4 - 6)  atropine 1% Ophthalmic Solution for SubLingual Use 2 Drop(s) SubLingual two times a day PRN sialorrhea/hypersalivation  haloperidol     Tablet 5 milliGRAM(s) Oral every 4 hours PRN severe agitation  haloperidol    Injectable 5 milliGRAM(s) IntraMuscular once PRN severe agitation  LORazepam     Tablet 2 milliGRAM(s) Oral four times a day PRN Agitation  nicotine  Polacrilex Gum 4 milliGRAM(s) Oral every 2 hours PRN nicotine dependence  OLANZapine Disintegrating Tablet 5 milliGRAM(s) Oral every 4 hours PRN agitation  OLANZapine Injectable 10 milliGRAM(s) IntraMuscular once PRN agitation  
MEDICATIONS  (STANDING):  lithium CR (ESKALITH-CR) 900 milliGRAM(s) Oral <User Schedule>  lithium CR (ESKALITH-CR) 450 milliGRAM(s) Oral daily  LORazepam     Tablet 0.5 milliGRAM(s) Oral at bedtime  OLANZapine Disintegrating Tablet 5 milliGRAM(s) Oral at bedtime  propranolol 10 milliGRAM(s) Oral three times a day    MEDICATIONS  (PRN):  acetaminophen     Tablet .. 650 milliGRAM(s) Oral every 6 hours PRN Temp greater or equal to 38C (100.4F), Mild Pain (1 - 3), Moderate Pain (4 - 6)  atropine 1% Ophthalmic Solution for SubLingual Use 2 Drop(s) SubLingual two times a day PRN sialorrhea/hypersalivation  haloperidol     Tablet 5 milliGRAM(s) Oral every 4 hours PRN severe agitation  haloperidol    Injectable 5 milliGRAM(s) IntraMuscular once PRN severe agitation  LORazepam     Tablet 2 milliGRAM(s) Oral four times a day PRN Agitation  nicotine  Polacrilex Gum 4 milliGRAM(s) Oral every 2 hours PRN nicotine dependence  OLANZapine Disintegrating Tablet 5 milliGRAM(s) Oral every 4 hours PRN agitation  OLANZapine Injectable 10 milliGRAM(s) IntraMuscular once PRN agitation  
MEDICATIONS  (STANDING):  lithium CR (ESKALITH-CR) 450 milliGRAM(s) Oral daily  lithium CR (ESKALITH-CR) 900 milliGRAM(s) Oral <User Schedule>  LORazepam     Tablet 2 milliGRAM(s) Oral two times a day  OLANZapine Disintegrating Tablet 5 milliGRAM(s) Oral at bedtime  propranolol 10 milliGRAM(s) Oral three times a day    MEDICATIONS  (PRN):  acetaminophen     Tablet .. 650 milliGRAM(s) Oral every 6 hours PRN Temp greater or equal to 38C (100.4F), Mild Pain (1 - 3), Moderate Pain (4 - 6)  haloperidol     Tablet 5 milliGRAM(s) Oral every 4 hours PRN severe agitation  haloperidol    Injectable 5 milliGRAM(s) IntraMuscular once PRN severe agitation  LORazepam     Tablet 2 milliGRAM(s) Oral four times a day PRN Agitation  nicotine  Polacrilex Gum 4 milliGRAM(s) Oral every 2 hours PRN nicotine dependence  OLANZapine Disintegrating Tablet 5 milliGRAM(s) Oral every 4 hours PRN agitation  OLANZapine Injectable 10 milliGRAM(s) IntraMuscular once PRN agitation  sulindac 200 milliGRAM(s) Oral every 12 hours PRN pain  
MEDICATIONS  (STANDING):  propranolol 10 milliGRAM(s) Oral three times a day  risperiDONE   Tablet 2 milliGRAM(s) Oral two times a day    MEDICATIONS  (PRN):  acetaminophen     Tablet .. 650 milliGRAM(s) Oral every 6 hours PRN Temp greater or equal to 38C (100.4F), Mild Pain (1 - 3), Moderate Pain (4 - 6)  diphenhydrAMINE 50 milliGRAM(s) Oral every 6 hours PRN Extrapyramidal prophylaxis  diphenhydrAMINE Injectable 50 milliGRAM(s) IntraMuscular once PRN Extrapyramidal prophylaxis  LORazepam     Tablet 2 milliGRAM(s) Oral four times a day PRN Agitation  LORazepam   Injectable 3 milliGRAM(s) IntraMuscular Once PRN severe agitation  nicotine  Polacrilex Gum 4 milliGRAM(s) Oral every 2 hours PRN nicotine dependence  OLANZapine Injectable 10 milliGRAM(s) IntraMuscular once PRN severe agitation  risperiDONE  Disintegrating Tablet 2 milliGRAM(s) Oral two times a day PRN agitation  
MEDICATIONS  (STANDING):  propranolol 10 milliGRAM(s) Oral three times a day  risperiDONE   Tablet 2 milliGRAM(s) Oral two times a day    MEDICATIONS  (PRN):  diphenhydrAMINE 50 milliGRAM(s) Oral every 6 hours PRN Extrapyramidal prophylaxis  diphenhydrAMINE Injectable 50 milliGRAM(s) IntraMuscular once PRN Extrapyramidal prophylaxis  LORazepam     Tablet 2 milliGRAM(s) Oral four times a day PRN Agitation  LORazepam   Injectable 3 milliGRAM(s) IntraMuscular Once PRN severe agitation  nicotine  Polacrilex Gum 4 milliGRAM(s) Oral every 2 hours PRN nicotine dependence  OLANZapine Injectable 10 milliGRAM(s) IntraMuscular once PRN severe agitation  risperiDONE  Disintegrating Tablet 2 milliGRAM(s) Oral two times a day PRN agitation  
MEDICATIONS  (STANDING):  propranolol 10 milliGRAM(s) Oral three times a day  risperiDONE   Tablet 2 milliGRAM(s) Oral two times a day    MEDICATIONS  (PRN):  acetaminophen     Tablet .. 650 milliGRAM(s) Oral every 6 hours PRN Temp greater or equal to 38C (100.4F), Mild Pain (1 - 3), Moderate Pain (4 - 6)  diphenhydrAMINE 50 milliGRAM(s) Oral every 6 hours PRN Extrapyramidal prophylaxis  diphenhydrAMINE Injectable 50 milliGRAM(s) IntraMuscular once PRN Extrapyramidal prophylaxis  LORazepam     Tablet 2 milliGRAM(s) Oral four times a day PRN Agitation  LORazepam   Injectable 3 milliGRAM(s) IntraMuscular Once PRN severe agitation  nicotine  Polacrilex Gum 4 milliGRAM(s) Oral every 2 hours PRN nicotine dependence  OLANZapine Injectable 10 milliGRAM(s) IntraMuscular once PRN severe agitation  risperiDONE  Disintegrating Tablet 2 milliGRAM(s) Oral two times a day PRN agitation  
MEDICATIONS  (STANDING):  lithium CR (ESKALITH-CR) 450 milliGRAM(s) Oral daily  lithium CR (ESKALITH-CR) 900 milliGRAM(s) Oral <User Schedule>  LORazepam     Tablet 2 milliGRAM(s) Oral two times a day  OLANZapine Disintegrating Tablet 5 milliGRAM(s) Oral at bedtime  propranolol 10 milliGRAM(s) Oral three times a day    MEDICATIONS  (PRN):  acetaminophen     Tablet .. 650 milliGRAM(s) Oral every 6 hours PRN Temp greater or equal to 38C (100.4F), Mild Pain (1 - 3), Moderate Pain (4 - 6)  haloperidol     Tablet 5 milliGRAM(s) Oral every 4 hours PRN severe agitation  haloperidol    Injectable 5 milliGRAM(s) IntraMuscular once PRN severe agitation  LORazepam     Tablet 2 milliGRAM(s) Oral four times a day PRN Agitation  nicotine  Polacrilex Gum 4 milliGRAM(s) Oral every 2 hours PRN nicotine dependence  OLANZapine Disintegrating Tablet 5 milliGRAM(s) Oral every 4 hours PRN agitation  OLANZapine Injectable 10 milliGRAM(s) IntraMuscular once PRN agitation  sulindac 200 milliGRAM(s) Oral every 12 hours PRN pain  
MEDICATIONS  (STANDING):  OLANZapine Disintegrating Tablet 5 milliGRAM(s) Oral daily  OLANZapine Disintegrating Tablet 10 milliGRAM(s) Oral at bedtime  propranolol 10 milliGRAM(s) Oral three times a day    MEDICATIONS  (PRN):  diphenhydrAMINE 50 milliGRAM(s) Oral every 6 hours PRN Extrapyramidal prophylaxis  diphenhydrAMINE Injectable 50 milliGRAM(s) IntraMuscular once PRN Extrapyramidal prophylaxis  haloperidol     Tablet 5 milliGRAM(s) Oral every 6 hours PRN agitation  haloperidol    Injectable 7.5 milliGRAM(s) IntraMuscular once PRN Agitation  LORazepam     Tablet 2 milliGRAM(s) Oral every 6 hours PRN Agitation  LORazepam   Injectable 3 milliGRAM(s) IntraMuscular Once PRN severe agitation  nicotine  Polacrilex Gum 4 milliGRAM(s) Oral every 2 hours PRN nicotine dependence  OLANZapine Injectable 10 milliGRAM(s) IntraMuscular once PRN severe agitation  
MEDICATIONS  (STANDING):  chlorproMAZINE    Tablet 50 milliGRAM(s) Oral daily  lithium CR (ESKALITH-CR) 900 milliGRAM(s) Oral at bedtime  LORazepam     Tablet 2 milliGRAM(s) Oral two times a day  propranolol 10 milliGRAM(s) Oral three times a day    MEDICATIONS  (PRN):  acetaminophen     Tablet .. 650 milliGRAM(s) Oral every 6 hours PRN Temp greater or equal to 38C (100.4F), Mild Pain (1 - 3), Moderate Pain (4 - 6)  chlorproMAZINE    Injectable 50 milliGRAM(s) IntraMuscular once PRN combative behavior  chlorproMAZINE    Tablet 50 milliGRAM(s) Oral every 4 hours PRN agitation  diphenhydrAMINE 50 milliGRAM(s) Oral every 6 hours PRN Extrapyramidal prophylaxis  diphenhydrAMINE Injectable 50 milliGRAM(s) IntraMuscular once PRN Extrapyramidal prophylaxis  LORazepam     Tablet 2 milliGRAM(s) Oral four times a day PRN Agitation  nicotine  Polacrilex Gum 4 milliGRAM(s) Oral every 2 hours PRN nicotine dependence  
MEDICATIONS  (STANDING):  lithium CR (ESKALITH-CR) 900 milliGRAM(s) Oral <User Schedule>  LORazepam     Tablet 2 milliGRAM(s) Oral two times a day  OLANZapine Disintegrating Tablet 5 milliGRAM(s) Oral at bedtime  propranolol 10 milliGRAM(s) Oral three times a day    MEDICATIONS  (PRN):  acetaminophen     Tablet .. 650 milliGRAM(s) Oral every 6 hours PRN Temp greater or equal to 38C (100.4F), Mild Pain (1 - 3), Moderate Pain (4 - 6)  LORazepam     Tablet 2 milliGRAM(s) Oral four times a day PRN Agitation  nicotine  Polacrilex Gum 4 milliGRAM(s) Oral every 2 hours PRN nicotine dependence  OLANZapine Injectable 10 milliGRAM(s) IntraMuscular once PRN agitation  
MEDICATIONS  (STANDING):  lithium CR (ESKALITH-CR) 450 milliGRAM(s) Oral daily  lithium CR (ESKALITH-CR) 900 milliGRAM(s) Oral <User Schedule>  LORazepam     Tablet 2 milliGRAM(s) Oral two times a day  OLANZapine Disintegrating Tablet 5 milliGRAM(s) Oral at bedtime  propranolol 10 milliGRAM(s) Oral three times a day    MEDICATIONS  (PRN):  acetaminophen     Tablet .. 650 milliGRAM(s) Oral every 6 hours PRN Temp greater or equal to 38C (100.4F), Mild Pain (1 - 3), Moderate Pain (4 - 6)  haloperidol     Tablet 5 milliGRAM(s) Oral every 4 hours PRN severe agitation  haloperidol    Injectable 5 milliGRAM(s) IntraMuscular once PRN severe agitation  LORazepam     Tablet 2 milliGRAM(s) Oral four times a day PRN Agitation  nicotine  Polacrilex Gum 4 milliGRAM(s) Oral every 2 hours PRN nicotine dependence  OLANZapine Disintegrating Tablet 5 milliGRAM(s) Oral every 4 hours PRN agitation  OLANZapine Injectable 10 milliGRAM(s) IntraMuscular once PRN agitation  sulindac 200 milliGRAM(s) Oral every 12 hours PRN pain  
MEDICATIONS  (STANDING):  lithium CR (ESKALITH-CR) 450 milliGRAM(s) Oral daily  lithium CR (ESKALITH-CR) 900 milliGRAM(s) Oral <User Schedule>  LORazepam     Tablet 2 milliGRAM(s) Oral two times a day  OLANZapine Disintegrating Tablet 5 milliGRAM(s) Oral at bedtime  propranolol 10 milliGRAM(s) Oral three times a day    MEDICATIONS  (PRN):  acetaminophen     Tablet .. 650 milliGRAM(s) Oral every 6 hours PRN Temp greater or equal to 38C (100.4F), Mild Pain (1 - 3), Moderate Pain (4 - 6)  haloperidol     Tablet 5 milliGRAM(s) Oral every 4 hours PRN severe agitation  haloperidol    Injectable 5 milliGRAM(s) IntraMuscular once PRN severe agitation  LORazepam     Tablet 2 milliGRAM(s) Oral four times a day PRN Agitation  nicotine  Polacrilex Gum 4 milliGRAM(s) Oral every 2 hours PRN nicotine dependence  OLANZapine Disintegrating Tablet 5 milliGRAM(s) Oral every 4 hours PRN agitation  OLANZapine Injectable 10 milliGRAM(s) IntraMuscular once PRN agitation  
MEDICATIONS  (STANDING):  OLANZapine Disintegrating Tablet 5 milliGRAM(s) Oral daily  OLANZapine Disintegrating Tablet 10 milliGRAM(s) Oral at bedtime  propranolol 10 milliGRAM(s) Oral three times a day    MEDICATIONS  (PRN):  diphenhydrAMINE 50 milliGRAM(s) Oral every 6 hours PRN Extrapyramidal prophylaxis  diphenhydrAMINE Injectable 50 milliGRAM(s) IntraMuscular once PRN Extrapyramidal prophylaxis  haloperidol     Tablet 5 milliGRAM(s) Oral every 6 hours PRN agitation  haloperidol    Injectable 7.5 milliGRAM(s) IntraMuscular once PRN Agitation  LORazepam     Tablet 2 milliGRAM(s) Oral every 6 hours PRN Agitation  LORazepam   Injectable 3 milliGRAM(s) IntraMuscular Once PRN severe agitation  nicotine  Polacrilex Gum 4 milliGRAM(s) Oral every 2 hours PRN nicotine dependence  OLANZapine Injectable 10 milliGRAM(s) IntraMuscular once PRN severe agitation  
MEDICATIONS  (STANDING):  lithium CR (ESKALITH-CR) 450 milliGRAM(s) Oral daily  lithium CR (ESKALITH-CR) 900 milliGRAM(s) Oral <User Schedule>  LORazepam     Tablet 2 milliGRAM(s) Oral two times a day  OLANZapine Disintegrating Tablet 5 milliGRAM(s) Oral at bedtime  propranolol 10 milliGRAM(s) Oral three times a day    MEDICATIONS  (PRN):  acetaminophen     Tablet .. 650 milliGRAM(s) Oral every 6 hours PRN Temp greater or equal to 38C (100.4F), Mild Pain (1 - 3), Moderate Pain (4 - 6)  haloperidol     Tablet 5 milliGRAM(s) Oral every 4 hours PRN severe agitation  haloperidol    Injectable 5 milliGRAM(s) IntraMuscular once PRN severe agitation  LORazepam     Tablet 2 milliGRAM(s) Oral four times a day PRN Agitation  nicotine  Polacrilex Gum 4 milliGRAM(s) Oral every 2 hours PRN nicotine dependence  OLANZapine Disintegrating Tablet 5 milliGRAM(s) Oral every 4 hours PRN agitation  OLANZapine Injectable 10 milliGRAM(s) IntraMuscular once PRN agitation  sulindac 200 milliGRAM(s) Oral every 12 hours PRN pain  
MEDICATIONS  (STANDING):  OLANZapine Disintegrating Tablet 5 milliGRAM(s) Oral daily  OLANZapine Disintegrating Tablet 10 milliGRAM(s) Oral at bedtime  propranolol 10 milliGRAM(s) Oral three times a day    MEDICATIONS  (PRN):  diphenhydrAMINE 50 milliGRAM(s) Oral every 6 hours PRN Extrapyramidal prophylaxis  diphenhydrAMINE Injectable 50 milliGRAM(s) IntraMuscular once PRN Extrapyramidal prophylaxis  haloperidol     Tablet 5 milliGRAM(s) Oral every 6 hours PRN agitation  haloperidol    Injectable 7.5 milliGRAM(s) IntraMuscular once PRN Agitation  LORazepam     Tablet 2 milliGRAM(s) Oral every 6 hours PRN Agitation  LORazepam   Injectable 3 milliGRAM(s) IntraMuscular Once PRN severe agitation  nicotine  Polacrilex Gum 4 milliGRAM(s) Oral every 2 hours PRN nicotine dependence  OLANZapine Injectable 10 milliGRAM(s) IntraMuscular once PRN severe agitation  
MEDICATIONS  (STANDING):  lithium CR (ESKALITH-CR) 900 milliGRAM(s) Oral <User Schedule>  lithium CR (ESKALITH-CR) 450 milliGRAM(s) Oral daily  LORazepam     Tablet 1 milliGRAM(s) Oral two times a day  OLANZapine Disintegrating Tablet 5 milliGRAM(s) Oral at bedtime  propranolol 10 milliGRAM(s) Oral three times a day    MEDICATIONS  (PRN):  acetaminophen     Tablet .. 650 milliGRAM(s) Oral every 6 hours PRN Temp greater or equal to 38C (100.4F), Mild Pain (1 - 3), Moderate Pain (4 - 6)  haloperidol     Tablet 5 milliGRAM(s) Oral every 4 hours PRN severe agitation  haloperidol    Injectable 5 milliGRAM(s) IntraMuscular once PRN severe agitation  LORazepam     Tablet 2 milliGRAM(s) Oral four times a day PRN Agitation  nicotine  Polacrilex Gum 4 milliGRAM(s) Oral every 2 hours PRN nicotine dependence  OLANZapine Disintegrating Tablet 5 milliGRAM(s) Oral every 4 hours PRN agitation  OLANZapine Injectable 10 milliGRAM(s) IntraMuscular once PRN agitation  sulindac 200 milliGRAM(s) Oral every 12 hours PRN pain  
MEDICATIONS  (STANDING):  lithium CR (ESKALITH-CR) 450 milliGRAM(s) Oral daily  lithium CR (ESKALITH-CR) 900 milliGRAM(s) Oral <User Schedule>  LORazepam     Tablet 2 milliGRAM(s) Oral two times a day  OLANZapine Disintegrating Tablet 5 milliGRAM(s) Oral at bedtime  propranolol 10 milliGRAM(s) Oral three times a day    MEDICATIONS  (PRN):  acetaminophen     Tablet .. 650 milliGRAM(s) Oral every 6 hours PRN Temp greater or equal to 38C (100.4F), Mild Pain (1 - 3), Moderate Pain (4 - 6)  haloperidol     Tablet 5 milliGRAM(s) Oral every 4 hours PRN severe agitation  haloperidol    Injectable 5 milliGRAM(s) IntraMuscular once PRN severe agitation  LORazepam     Tablet 2 milliGRAM(s) Oral four times a day PRN Agitation  nicotine  Polacrilex Gum 4 milliGRAM(s) Oral every 2 hours PRN nicotine dependence  OLANZapine Disintegrating Tablet 5 milliGRAM(s) Oral every 4 hours PRN agitation  OLANZapine Injectable 10 milliGRAM(s) IntraMuscular once PRN agitation  sulindac 200 milliGRAM(s) Oral every 12 hours PRN pain  
MEDICATIONS  (STANDING):  lithium CR (ESKALITH-CR) 900 milliGRAM(s) Oral <User Schedule>  LORazepam     Tablet 2 milliGRAM(s) Oral two times a day  OLANZapine Disintegrating Tablet 5 milliGRAM(s) Oral at bedtime  propranolol 10 milliGRAM(s) Oral three times a day    MEDICATIONS  (PRN):  acetaminophen     Tablet .. 650 milliGRAM(s) Oral every 6 hours PRN Temp greater or equal to 38C (100.4F), Mild Pain (1 - 3), Moderate Pain (4 - 6)  LORazepam     Tablet 2 milliGRAM(s) Oral four times a day PRN Agitation  nicotine  Polacrilex Gum 4 milliGRAM(s) Oral every 2 hours PRN nicotine dependence  OLANZapine Disintegrating Tablet 5 milliGRAM(s) Oral every 4 hours PRN agitation  OLANZapine Injectable 10 milliGRAM(s) IntraMuscular once PRN agitation  
MEDICATIONS  (STANDING):  propranolol 10 milliGRAM(s) Oral three times a day  risperiDONE   Tablet 2 milliGRAM(s) Oral two times a day    MEDICATIONS  (PRN):  diphenhydrAMINE 50 milliGRAM(s) Oral every 6 hours PRN Extrapyramidal prophylaxis  diphenhydrAMINE Injectable 50 milliGRAM(s) IntraMuscular once PRN Extrapyramidal prophylaxis  haloperidol     Tablet 5 milliGRAM(s) Oral every 6 hours PRN agitation  haloperidol    Injectable 7.5 milliGRAM(s) IntraMuscular once PRN Agitation  LORazepam     Tablet 2 milliGRAM(s) Oral every 6 hours PRN Agitation  LORazepam   Injectable 3 milliGRAM(s) IntraMuscular Once PRN severe agitation  nicotine  Polacrilex Gum 4 milliGRAM(s) Oral every 2 hours PRN nicotine dependence  OLANZapine Injectable 10 milliGRAM(s) IntraMuscular once PRN severe agitation  
MEDICATIONS  (STANDING):  propranolol 10 milliGRAM(s) Oral three times a day  risperiDONE   Tablet 2 milliGRAM(s) Oral two times a day    MEDICATIONS  (PRN):  diphenhydrAMINE 50 milliGRAM(s) Oral every 6 hours PRN Extrapyramidal prophylaxis  diphenhydrAMINE Injectable 50 milliGRAM(s) IntraMuscular once PRN Extrapyramidal prophylaxis  haloperidol     Tablet 5 milliGRAM(s) Oral every 6 hours PRN agitation  haloperidol    Injectable 7.5 milliGRAM(s) IntraMuscular once PRN Agitation  LORazepam     Tablet 2 milliGRAM(s) Oral four times a day PRN Agitation  LORazepam   Injectable 3 milliGRAM(s) IntraMuscular Once PRN severe agitation  nicotine  Polacrilex Gum 4 milliGRAM(s) Oral every 2 hours PRN nicotine dependence  OLANZapine 10 milliGRAM(s) Oral three times a day PRN agitation  OLANZapine Injectable 10 milliGRAM(s) IntraMuscular once PRN severe agitation  
MEDICATIONS  (STANDING):  lithium CR (ESKALITH-CR) 900 milliGRAM(s) Oral <User Schedule>  LORazepam     Tablet 2 milliGRAM(s) Oral two times a day  OLANZapine Disintegrating Tablet 5 milliGRAM(s) Oral at bedtime  propranolol 10 milliGRAM(s) Oral three times a day    MEDICATIONS  (PRN):  acetaminophen     Tablet .. 650 milliGRAM(s) Oral every 6 hours PRN Temp greater or equal to 38C (100.4F), Mild Pain (1 - 3), Moderate Pain (4 - 6)  LORazepam     Tablet 2 milliGRAM(s) Oral four times a day PRN Agitation  nicotine  Polacrilex Gum 4 milliGRAM(s) Oral every 2 hours PRN nicotine dependence  OLANZapine Disintegrating Tablet 5 milliGRAM(s) Oral every 4 hours PRN agitation  OLANZapine Injectable 10 milliGRAM(s) IntraMuscular once PRN agitation  
MEDICATIONS  (STANDING):  lithium CR (ESKALITH-CR) 450 milliGRAM(s) Oral daily  lithium CR (ESKALITH-CR) 900 milliGRAM(s) Oral <User Schedule>  LORazepam     Tablet 2 milliGRAM(s) Oral two times a day  OLANZapine Disintegrating Tablet 5 milliGRAM(s) Oral at bedtime  propranolol 10 milliGRAM(s) Oral three times a day    MEDICATIONS  (PRN):  acetaminophen     Tablet .. 650 milliGRAM(s) Oral every 6 hours PRN Temp greater or equal to 38C (100.4F), Mild Pain (1 - 3), Moderate Pain (4 - 6)  haloperidol     Tablet 5 milliGRAM(s) Oral every 4 hours PRN severe agitation  haloperidol    Injectable 5 milliGRAM(s) IntraMuscular once PRN severe agitation  LORazepam     Tablet 2 milliGRAM(s) Oral four times a day PRN Agitation  nicotine  Polacrilex Gum 4 milliGRAM(s) Oral every 2 hours PRN nicotine dependence  OLANZapine Disintegrating Tablet 5 milliGRAM(s) Oral every 4 hours PRN agitation  OLANZapine Injectable 10 milliGRAM(s) IntraMuscular once PRN agitation  sulindac 200 milliGRAM(s) Oral every 12 hours PRN pain  
MEDICATIONS  (STANDING):  lithium CR (ESKALITH-CR) 450 milliGRAM(s) Oral daily  lithium CR (ESKALITH-CR) 900 milliGRAM(s) Oral <User Schedule>  LORazepam     Tablet 2 milliGRAM(s) Oral two times a day  OLANZapine Disintegrating Tablet 5 milliGRAM(s) Oral at bedtime  propranolol 10 milliGRAM(s) Oral three times a day    MEDICATIONS  (PRN):  acetaminophen     Tablet .. 650 milliGRAM(s) Oral every 6 hours PRN Temp greater or equal to 38C (100.4F), Mild Pain (1 - 3), Moderate Pain (4 - 6)  haloperidol     Tablet 5 milliGRAM(s) Oral every 4 hours PRN severe agitation  haloperidol    Injectable 5 milliGRAM(s) IntraMuscular once PRN severe agitation  LORazepam     Tablet 2 milliGRAM(s) Oral four times a day PRN Agitation  nicotine  Polacrilex Gum 4 milliGRAM(s) Oral every 2 hours PRN nicotine dependence  OLANZapine Disintegrating Tablet 5 milliGRAM(s) Oral every 4 hours PRN agitation  OLANZapine Injectable 10 milliGRAM(s) IntraMuscular once PRN agitation  
MEDICATIONS  (STANDING):  propranolol 10 milliGRAM(s) Oral three times a day    MEDICATIONS  (PRN):  acetaminophen     Tablet .. 650 milliGRAM(s) Oral every 6 hours PRN Temp greater or equal to 38C (100.4F), Mild Pain (1 - 3), Moderate Pain (4 - 6)  diphenhydrAMINE 50 milliGRAM(s) Oral every 6 hours PRN Extrapyramidal prophylaxis  diphenhydrAMINE Injectable 50 milliGRAM(s) IntraMuscular once PRN Extrapyramidal prophylaxis  ibuprofen  Tablet. 400 milliGRAM(s) Oral once PRN Mild Pain (1 - 3), Moderate Pain (4 - 6)  LORazepam     Tablet 2 milliGRAM(s) Oral four times a day PRN Agitation  nicotine  Polacrilex Gum 4 milliGRAM(s) Oral every 2 hours PRN nicotine dependence  OLANZapine Injectable 20 milliGRAM(s) IntraMuscular every 12 hours PRN agitation  risperiDONE  Disintegrating Tablet 2 milliGRAM(s) Oral two times a day PRN agitation  
MEDICATIONS  (STANDING):  lithium CR (ESKALITH-CR) 450 milliGRAM(s) Oral daily  lithium CR (ESKALITH-CR) 900 milliGRAM(s) Oral <User Schedule>  LORazepam     Tablet 0.5 milliGRAM(s) Oral two times a day  OLANZapine Disintegrating Tablet 5 milliGRAM(s) Oral at bedtime  propranolol 10 milliGRAM(s) Oral three times a day    MEDICATIONS  (PRN):  acetaminophen     Tablet .. 650 milliGRAM(s) Oral every 6 hours PRN Temp greater or equal to 38C (100.4F), Mild Pain (1 - 3), Moderate Pain (4 - 6)  haloperidol     Tablet 5 milliGRAM(s) Oral every 4 hours PRN severe agitation  haloperidol    Injectable 5 milliGRAM(s) IntraMuscular once PRN severe agitation  LORazepam     Tablet 2 milliGRAM(s) Oral four times a day PRN Agitation  nicotine  Polacrilex Gum 4 milliGRAM(s) Oral every 2 hours PRN nicotine dependence  OLANZapine Disintegrating Tablet 5 milliGRAM(s) Oral every 4 hours PRN agitation  OLANZapine Injectable 10 milliGRAM(s) IntraMuscular once PRN agitation  
MEDICATIONS  (STANDING):  lithium CR (ESKALITH-CR) 450 milliGRAM(s) Oral daily  lithium CR (ESKALITH-CR) 900 milliGRAM(s) Oral <User Schedule>  LORazepam     Tablet 2 milliGRAM(s) Oral two times a day  OLANZapine Disintegrating Tablet 5 milliGRAM(s) Oral at bedtime  propranolol 10 milliGRAM(s) Oral three times a day    MEDICATIONS  (PRN):  acetaminophen     Tablet .. 650 milliGRAM(s) Oral every 6 hours PRN Temp greater or equal to 38C (100.4F), Mild Pain (1 - 3), Moderate Pain (4 - 6)  haloperidol     Tablet 5 milliGRAM(s) Oral every 4 hours PRN severe agitation  haloperidol    Injectable 5 milliGRAM(s) IntraMuscular once PRN severe agitation  LORazepam     Tablet 2 milliGRAM(s) Oral four times a day PRN Agitation  nicotine  Polacrilex Gum 4 milliGRAM(s) Oral every 2 hours PRN nicotine dependence  OLANZapine Disintegrating Tablet 5 milliGRAM(s) Oral every 4 hours PRN agitation  OLANZapine Injectable 10 milliGRAM(s) IntraMuscular once PRN agitation  sulindac 200 milliGRAM(s) Oral every 12 hours PRN pain  
MEDICATIONS  (STANDING):  lithium CR (ESKALITH-CR) 900 milliGRAM(s) Oral <User Schedule>  lithium CR (ESKALITH-CR) 450 milliGRAM(s) Oral daily  LORazepam     Tablet 1 milliGRAM(s) Oral two times a day  OLANZapine Disintegrating Tablet 5 milliGRAM(s) Oral at bedtime  propranolol 10 milliGRAM(s) Oral three times a day    MEDICATIONS  (PRN):  acetaminophen     Tablet .. 650 milliGRAM(s) Oral every 6 hours PRN Temp greater or equal to 38C (100.4F), Mild Pain (1 - 3), Moderate Pain (4 - 6)  haloperidol     Tablet 5 milliGRAM(s) Oral every 4 hours PRN severe agitation  haloperidol    Injectable 5 milliGRAM(s) IntraMuscular once PRN severe agitation  LORazepam     Tablet 2 milliGRAM(s) Oral four times a day PRN Agitation  nicotine  Polacrilex Gum 4 milliGRAM(s) Oral every 2 hours PRN nicotine dependence  OLANZapine Disintegrating Tablet 5 milliGRAM(s) Oral every 4 hours PRN agitation  OLANZapine Injectable 10 milliGRAM(s) IntraMuscular once PRN agitation  sulindac 200 milliGRAM(s) Oral every 12 hours PRN pain

## 2021-11-23 NOTE — BH INPATIENT PSYCHIATRY PROGRESS NOTE - NSTXPROBVIOLNT_PSY_ALL_CORE
VIOLENT/AGGRESSIVE BEHAVIOR

## 2021-11-23 NOTE — BH INPATIENT PSYCHIATRY DISCHARGE NOTE - DESCRIPTION
Lives with parents and brother and sister. Dropped out of [employer]. Unemployed. Family came to [place] when patient was 8y.

## 2021-11-23 NOTE — BH INPATIENT PSYCHIATRY PROGRESS NOTE - NSBHFUPINTERVALHXFT_PSY_A_CORE
Chart reviewed, including vital signs, medication administration record, lab results, and nursing notes.   Case discussed with nursing, psychology, psych rehab, and social work in team meeting.     Patient is seen in the group room, in no acute distress, amenable to interview. Reports that he is doing fine today, and denies any medication side effects. Reports stable sleep and appetite. Denies SI/HI/AVH. We discussed the plan to pursue treatment with an ACT and AOT order.

## 2021-11-23 NOTE — BH INPATIENT PSYCHIATRY PROGRESS NOTE - NSTXPSYCHOPROGRES_PSY_ALL_CORE
No Change
Improving
No Change

## 2021-11-23 NOTE — BH INPATIENT PSYCHIATRY PROGRESS NOTE - NSBHMSESPEECH_PSY_A_CORE
Normal volume, rate, productivity, spontaneity and articulation
Non-verbal: unable to assess speech further
Normal volume, rate, productivity, spontaneity and articulation

## 2021-11-23 NOTE — BH INPATIENT PSYCHIATRY PROGRESS NOTE - NSBHCONSBHPROVDETAILS_PSY_A_CORE  FT
Called Dr. Alonzo at [phone]:  - Due to threats he has been made against his outpatient psychiatrist Dr. Alonzo, he will be discharged from the [facility]. Dr. Alonzo hopes to be updated about the nature of threats made against him prior to discharge.   - The patient was more organized when on Invega Sustenna, but he refused to get the injection again after leaving the hospital last time.  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 Alonzo recommends that the patient be restricted from buying guns via the Safe Act. 
Called Dr. Alonzo at [phone]:  - Due to threats he has been made against his outpatient psychiatrist Dr. Alonzo, he will be discharged from the ET clinic. Dr. Alonzo hopes to be updated about the nature of threats made against him prior to discharge.   - The patient was more organized when on Invega Sustenna, but he refused to get the injection again after leaving the hospital last time.  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 Alonzo recommends that the patient be restricted from buying guns via the Safe Act. 
Called Dr. Alonzo at [phone]:  - Due to threats he has been made against his outpatient psychiatrist Dr. Alonzo, he will be discharged from the ET clinic. Dr. Alonzo hopes to be updated about the nature of threats made against him prior to discharge.   - The patient was more organized when on Invega Sustenna, but he refused to get the injection again after leaving the hospital last time.  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 Alonzo recommends that the patient be restricted from buying guns via the Safe Act. 
Called Dr. Alonzo at [phone]:  - Due to threats he has been made against his outpatient psychiatrist Dr. Alonzo, he will be discharged from the ET clinic. Dr. Alonzo hopes to be updated about the nature of threats made against him prior to discharge.   - The patient was more organized when on Invega Sustenna, but he refused to get the injection again after leaving the hospital last time.  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 Alonzo recommends that the patient be restricted from buying guns via the Safe Act. 
Called Dr. Alonzo at [phone]:  - Due to threats he has been made against his outpatient psychiatrist Dr. Alonzo, he will be discharged from the [facility] clinic. Dr. Alonzo hopes to be updated about the nature of threats made against him prior to discharge.   - The patient was more organized when on Invega Sustenna, but he refused to get the injection again after leaving the hospital last time.  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 Alonzo recommends that the patient be restricted from buying guns via the Safe Act. 
Called Dr. Alonzo at [phone]:  - Due to threats he has been made against his outpatient psychiatrist Dr. Alonzo, he will be discharged from the [facility] clinic. Dr. Alonzo hopes to be updated about the nature of threats made against him prior to discharge.   - The patient was more organized when on Invega Sustenna, but he refused to get the injection again after leaving the hospital last time.  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 Alonzo recommends that the patient be restricted from buying guns via the Safe Act. 
Called Dr. Alonzo at [phone]:  - Due to threats he has been made against his outpatient psychiatrist Dr. Alonzo, he will be discharged from the ET clinic. Dr. Alonzo hopes to be updated about the nature of threats made against him prior to discharge.   - The patient was more organized when on Invega Sustenna, but he refused to get the injection again after leaving the hospital last time.  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 Alonzo recommends that the patient be restricted from buying guns via the Safe Act. 
Called Dr. Alonzo at [phone]:  - Due to threats he has been made against his outpatient psychiatrist Dr. Alonzo, he will be discharged from the [facility] clinic. Dr. Alonzo hopes to be updated about the nature of threats made against him prior to discharge.   - The patient was more organized when on Invega Sustenna, but he refused to get the injection again after leaving the hospital last time.  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 Alonzo recommends that the patient be restricted from buying guns via the Safe Act. 
Called Dr. Alonzo at [phone]:  - Due to threats he has been made against his outpatient psychiatrist Dr. Alonzo, he will be discharged from the ET clinic. Dr. Alonzo hopes to be updated about the nature of threats made against him prior to discharge.   - The patient was more organized when on Invega Sustenna, but he refused to get the injection again after leaving the hospital last time.  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 Alonzo recommends that the patient be restricted from buying guns via the Safe Act. 
Called Dr. Alonzo at [phone]:  - Due to threats he has been made against his outpatient psychiatrist Dr. Alonzo, he will be discharged from the ET clinic. Dr. Alonzo hopes to be updated about the nature of threats made against him prior to discharge.   - The patient was more organized when on Invega Sustenna, but he refused to get the injection again after leaving the hospital last time.  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 Alonzo recommends that the patient be restricted from buying guns via the Safe Act. 
Called Dr. Alonzo at [phone]:  - Due to threats he has been made against his outpatient psychiatrist Dr. Alonzo, he will be discharged from the ET clinic. Dr. Alonzo hopes to be updated about the nature of threats made against him prior to discharge.   - The patient was more organized when on Invega Sustenna, but he refused to get the injection again after leaving the hospital last time.  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 Alonzo recommends that the patient be restricted from buying guns via the Safe Act. 
Called Dr. Alonzo at [phone]:  - Due to threats he has been made against his outpatient psychiatrist Dr. Alonzo, he will be discharged from the [facility] clinic. Dr. Alonzo hopes to be updated about the nature of threats made against him prior to discharge.   - The patient was more organized when on Invega Sustenna, but he refused to get the injection again after leaving the hospital last time.  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 Alonzo recommends that the patient be restricted from buying guns via the Safe Act. 
Called Dr. Alonzo at [phone]:  - Due to threats he has been made against his outpatient psychiatrist Dr. Alonzo, he will be discharged from the ET clinic. Dr. Alonzo hopes to be updated about the nature of threats made against him prior to discharge.   - The patient was more organized when on Invega Sustenna, but he refused to get the injection again after leaving the hospital last time.  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 Alonzo recommends that the patient be restricted from buying guns via the Safe Act. 
Called Dr. Alonzo at [phone]:  - Due to threats he has been made against his outpatient psychiatrist Dr. Alonzo, he will be discharged from the ET clinic. Dr. Alonzo hopes to be updated about the nature of threats made against him prior to discharge.   - The patient was more organized when on Invega Sustenna, but he refused to get the injection again after leaving the hospital last time.  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 Alonzo recommends that the patient be restricted from buying guns via the Safe Act. 
Called Dr. Alonzo at [phone]:  - Due to threats he has been made against his outpatient psychiatrist Dr. Alonzo, he will be discharged from the ET clinic. Dr. Alonzo hopes to be updated about the nature of threats made against him prior to discharge.   - The patient was more organized when on Invega Sustenna, but he refused to get the injection again after leaving the hospital last time.  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 Alonzo recommends that the patient be restricted from buying guns via the Safe Act. 
Called Dr. Alonzo at [phone]:  - Due to threats he has been made against his outpatient psychiatrist Dr. Alonzo, he will be discharged from the ET clinic. Dr. Alonzo hopes to be updated about the nature of threats made against him prior to discharge.   - The patient was more organized when on Invega Sustenna, but he refused to get the injection again after leaving the hospital last time.  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 Alonzo recommends that the patient be restricted from buying guns via the Safe Act. 
Called Dr. Alonzo at [phone]:  - Due to threats he has been made against his outpatient psychiatrist Dr. Alonzo, he will be discharged from the ET clinic. Dr. Alonzo hopes to be updated about the nature of threats made against him prior to discharge.   - The patient was more organized when on Invega Sustenna, but he refused to get the injection again after leaving the hospital last time.  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 Alonzo recommends that the patient be restricted from buying guns via the Safe Act. 
Called Dr. Alonzo at [phone]:  - Due to threats he has been made against his outpatient psychiatrist Dr. Alonzo, he will be discharged from the ET clinic. Dr. Alonzo hopes to be updated about the nature of threats made against him prior to discharge.   - The patient was more organized when on Invega Sustenna, but he refused to get the injection again after leaving the hospital last time.  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 Alonzo recommends that the patient be restricted from buying guns via the Safe Act. 
Called Dr. Alonzo at [phone]:  - Due to threats he has been made against his outpatient psychiatrist Dr. Alonzo, he will be discharged from the ET clinic. Dr. Alonzo hopes to be updated about the nature of threats made against him prior to discharge.   - The patient was more organized when on Invega Sustenna, but he refused to get the injection again after leaving the hospital last time.  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 Alonzo recommends that the patient be restricted from buying guns via the Safe Act. 
Called Dr. Alonzo at [phone]:  - Due to threats he has been made against his outpatient psychiatrist Dr. Alonzo, he will be discharged from the ET clinic. Dr. Alonzo hopes to be updated about the nature of threats made against him prior to discharge.   - The patient was more organized when on Invega Sustenna, but he refused to get the injection again after leaving the hospital last time.  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 Alonzo recommends that the patient be restricted from buying guns via the Safe Act. 
Called Dr. Alonzo at [phone]:  - Due to threats he has been made against his outpatient psychiatrist Dr. Alonzo, he will be discharged from the ET clinic. Dr. Alonzo hopes to be updated about the nature of threats made against him prior to discharge.   - The patient was more organized when on Invega Sustenna, but he refused to get the injection again after leaving the hospital last time.  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 Alonzo recommends that the patient be restricted from buying guns via the Safe Act. 
Called Dr. Alonzo at [phone]:  - Due to threats he has been made against his outpatient psychiatrist Dr. Alonzo, he will be discharged from the ET clinic. Dr. Alonzo hopes to be updated about the nature of threats made against him prior to discharge.   - The patient was more organized when on Invega Sustenna, but he refused to get the injection again after leaving the hospital last time.  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 Alonzo recommends that the patient be restricted from buying guns via the Safe Act. 
Called Dr. Alonzo at [phone]:  - Due to threats he has been made against his outpatient psychiatrist Dr. Alonzo, he will be discharged from the ET clinic. Dr. Alonzo hopes to be updated about the nature of threats made against him prior to discharge.   - The patient was more organized when on Invega Sustenna, but he refused to get the injection again after leaving the hospital last time.  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 Alonzo recommends that the patient be restricted from buying guns via the Safe Act. 
Called Dr. Alonzo at [phone]:  - Due to threats he has been made against his outpatient psychiatrist Dr. Alonzo, he will be discharged from the ET clinic. Dr. Alonzo hopes to be updated about the nature of threats made against him prior to discharge.   - The patient was more organized when on Invega Sustenna, but he refused to get the injection again after leaving the hospital last time.  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 Alonzo recommends that the patient be restricted from buying guns via the Safe Act. 
Called Dr. Alonzo at [phone]:  - Due to threats he has been made against his outpatient psychiatrist Dr. Alonzo, he will be discharged from the ET clinic. Dr. Alonzo hopes to be updated about the nature of threats made against him prior to discharge.   - The patient was more organized when on Invega Sustenna, but he refused to get the injection again after leaving the hospital last time.  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 Alonzo recommends that the patient be restricted from buying guns via the Safe Act. 
Called Dr. Alonzo at [phone]:  - Due to threats he has been made against his outpatient psychiatrist Dr. Alonzo, he will be discharged from the ET clinic. Dr. Alonzo hopes to be updated about the nature of threats made against him prior to discharge.   - The patient was more organized when on Invega Sustenna, but he refused to get the injection again after leaving the hospital last time.  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 Alonzo recommends that the patient be restricted from buying guns via the Safe Act.

## 2021-11-23 NOTE — BH INPATIENT PSYCHIATRY PROGRESS NOTE - NSTXVIOLNTDATETRGT_PSY_ALL_CORE
06-Nov-2021
18-Nov-2021
06-Nov-2021
18-Nov-2021
06-Nov-2021
11-Nov-2021
17-Nov-2021
18-Nov-2021
18-Nov-2021
11-Nov-2021
04-Nov-2021
30-Oct-2021
30-Oct-2021
06-Nov-2021
30-Oct-2021
04-Nov-2021
18-Nov-2021
25-Nov-2021
17-Nov-2021
30-Oct-2021
11-Nov-2021
18-Nov-2021
25-Nov-2021
11-Nov-2021
25-Nov-2021
06-Nov-2021
11-Nov-2021
18-Nov-2021
11-Nov-2021

## 2021-11-23 NOTE — BH INPATIENT PSYCHIATRY PROGRESS NOTE - NSBHMSEAFFCONG_PSY_A_CORE
Unable to assess
Unable to assess
Congruent
Congruent
Unable to assess
Congruent
Unable to assess
Congruent
Unable to assess
Congruent
Unable to assess
Congruent
Unable to assess
Unable to assess
Congruent

## 2021-11-23 NOTE — BH INPATIENT PSYCHIATRY PROGRESS NOTE - NSTXPSYCHODATEEST_PSY_ALL_CORE
27-Oct-2021
27-Oct-2021
10-Nov-2021
27-Oct-2021
23-Oct-2021
27-Oct-2021
23-Oct-2021
27-Oct-2021

## 2021-11-23 NOTE — BH INPATIENT PSYCHIATRY PROGRESS NOTE - NSBHMSEKNOW_PSY_A_CORE
Normal
Normal
Unable to assess
Normal
Normal
Unable to assess
Normal
Unable to assess
Normal
Unable to assess
Normal
Normal
Unable to assess

## 2021-11-23 NOTE — BH INPATIENT PSYCHIATRY PROGRESS NOTE - NSBHMSEMOOD_PSY_A_CORE
Normal
Irritable/Angry
Irritable/Angry
Angry
Irritable
Normal
Irritable
Irritable/Unable to assess
Normal
Irritable
Irritable
Irritable/Unable to assess
Normal
Irritable/Unable to assess
Normal
Irritable/Angry
Normal
Angry
Irritable
Irritable

## 2021-11-23 NOTE — BH INPATIENT PSYCHIATRY PROGRESS NOTE - NSBHMSEGAIT_PSY_A_CORE
Normal gait / station
14F w/ sore throat, viral vs strep; will check strep swab, give sx relief, po challenge, reassess
Normal gait / station
Unable to assess
Normal gait / station

## 2021-11-23 NOTE — BH INPATIENT PSYCHIATRY PROGRESS NOTE - NSBHMSEINTELL_PSY_A_CORE
Average
Average
Unable to assess
Average
Unable to assess
Average
Unable to assess
Unable to assess
Average
Average
Unable to assess
Average
Average
Unable to assess

## 2021-11-23 NOTE — BH INPATIENT PSYCHIATRY PROGRESS NOTE - NSBHMSELANG_PSY_A_CORE
No abnormalities noted
No abnormalities noted/Unable to assess
No abnormalities noted
No abnormalities noted/Unable to assess
No abnormalities noted/Unable to assess
No abnormalities noted
No abnormalities noted
No abnormalities noted/Unable to assess
No abnormalities noted
No abnormalities noted/Unable to assess
No abnormalities noted
No abnormalities noted/Unable to assess
No abnormalities noted

## 2021-11-23 NOTE — BH INPATIENT PSYCHIATRY PROGRESS NOTE - NSBHCONSDANGEROTHERS_PSY_A_CORE
assaultive behavior/assaultive threats with plan and means

## 2021-11-23 NOTE — BH INPATIENT PSYCHIATRY PROGRESS NOTE - NSBHMSEREMMEM_PSY_A_CORE
Normal
Normal
Unable to assess
Normal
Unable to assess
Normal
Unable to assess

## 2021-11-23 NOTE — BH INPATIENT PSYCHIATRY PROGRESS NOTE - NSTXPSYCHOGOAL_PSY_ALL_CORE
Will be able to report experiencing hallucinations to staff
Will identify 2 coping skills that help mitigate hallucinations
Will be able to report experiencing hallucinations to staff
Will identify 1 trigger/stressor that exacerbates thoughts
Will identify 1 trigger/stressor that exacerbates thoughts
Other...
Will identify 1 trigger/stressor that exacerbates thoughts
Will be able to report experiencing hallucinations to staff
Will be able to report experiencing hallucinations to staff
Will identify 1 trigger/stressor that exacerbates thoughts
Other...
Will verbalize 1 strategy to successfully cope with psychotic symptoms
Will be able to report experiencing hallucinations to staff
Will identify 1 trigger/stressor that exacerbates thoughts
Will be able to report experiencing hallucinations to staff
Will identify 1 trigger/stressor that exacerbates thoughts
Will identify 2 coping skills that help mitigate hallucinations
Will be able to report experiencing hallucinations to staff
Will identify 1 trigger/stressor that exacerbates thoughts

## 2021-11-23 NOTE — BH INPATIENT PSYCHIATRY PROGRESS NOTE - NSBHMSETHTASSOC_PSY_A_CORE
Normal
Normal
Loose
Normal
Loose
Normal
Loose
Normal
Loose
Normal
Unable to assess
Loose

## 2021-11-23 NOTE — BH DISCHARGE NOTE NURSING/SOCIAL WORK/PSYCH REHAB - NSDCPRGOAL_PSY_ALL_CORE
During the current hospitalization, patient has been addressing psychiatric rehabilitation goals pertaining to identifying coping skills that assist in improving irritability, agitation and decreasing psychotic symptoms. Patient has demonstrated progress towards psychiatric rehabilitation goals during the current hospitalization. Patient exhibited progress through participating in individual therapy and coaching; developing additional coping skills to assist with improving mood and organization with anger management workbooks. Pt has been on effective behavioral control past 2 weeks.  Pt was able to identify warning signs to prevent relapse. Pt has been managing symptom with positive distraction and self soothing skills. Pt utilized coping skills such as shadow boxing, mindfulness, listening to music, Stop skill, fresh air, basket ball, DBT TIPP and positive distraction. Patient reports he will continue to practice coping skills. Patient reports overall improvement in mood. Patient was compliant with medication during current hospitalization. Patient was provided with a Press Ganey survey prior to discharge.

## 2021-11-23 NOTE — BH DISCHARGE NOTE NURSING/SOCIAL WORK/PSYCH REHAB - PATIENT PORTAL LINK FT
You can access the FollowMyHealth Patient Portal offered by [facility] by registering at the following website: [email or web address]. By joining [employer]’s FollowMyHealth portal, you will also be able to view your health information using other applications (apps) compatible with our system.
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
## 2021-11-23 NOTE — BH INPATIENT PSYCHIATRY PROGRESS NOTE - NSBHMSEBODY_PSY_A_CORE
Average build

## 2021-11-23 NOTE — BH INPATIENT PSYCHIATRY PROGRESS NOTE - NSTXPSYCHODATETRGT_PSY_ALL_CORE
17-Nov-2021
03-Nov-2021
17-Nov-2021
03-Nov-2021
03-Nov-2021
17-Nov-2021
03-Nov-2021
03-Nov-2021
17-Nov-2021
06-Nov-2021
17-Nov-2021
03-Nov-2021
06-Nov-2021
17-Nov-2021
17-Nov-2021
03-Nov-2021
06-Nov-2021
03-Nov-2021
17-Nov-2021
03-Nov-2021
17-Nov-2021
06-Nov-2021
17-Nov-2021
03-Nov-2021

## 2021-11-23 NOTE — BH INPATIENT PSYCHIATRY PROGRESS NOTE - PRN MEDS
MEDICATIONS  (PRN):  diphenhydrAMINE 50 milliGRAM(s) Oral every 6 hours PRN Extrapyramidal prophylaxis  diphenhydrAMINE Injectable 50 milliGRAM(s) IntraMuscular once PRN Extrapyramidal prophylaxis  haloperidol     Tablet 5 milliGRAM(s) Oral every 6 hours PRN agitation  haloperidol    Injectable 7.5 milliGRAM(s) IntraMuscular once PRN Agitation  LORazepam     Tablet 2 milliGRAM(s) Oral every 6 hours PRN Agitation  LORazepam   Injectable 3 milliGRAM(s) IntraMuscular Once PRN severe agitation  nicotine  Polacrilex Gum 4 milliGRAM(s) Oral every 2 hours PRN nicotine dependence  OLANZapine Injectable 10 milliGRAM(s) IntraMuscular once PRN severe agitation  
MEDICATIONS  (PRN):  acetaminophen     Tablet .. 650 milliGRAM(s) Oral every 6 hours PRN Temp greater or equal to 38C (100.4F), Mild Pain (1 - 3), Moderate Pain (4 - 6)  haloperidol     Tablet 5 milliGRAM(s) Oral every 4 hours PRN severe agitation  haloperidol    Injectable 5 milliGRAM(s) IntraMuscular once PRN severe agitation  LORazepam     Tablet 2 milliGRAM(s) Oral four times a day PRN Agitation  nicotine  Polacrilex Gum 4 milliGRAM(s) Oral every 2 hours PRN nicotine dependence  OLANZapine Disintegrating Tablet 5 milliGRAM(s) Oral every 4 hours PRN agitation  OLANZapine Injectable 10 milliGRAM(s) IntraMuscular once PRN agitation  sulindac 200 milliGRAM(s) Oral every 12 hours PRN pain  
MEDICATIONS  (PRN):  diphenhydrAMINE 50 milliGRAM(s) Oral every 6 hours PRN Extrapyramidal prophylaxis  diphenhydrAMINE Injectable 50 milliGRAM(s) IntraMuscular once PRN Extrapyramidal prophylaxis  haloperidol     Tablet 5 milliGRAM(s) Oral every 6 hours PRN agitation  haloperidol    Injectable 7.5 milliGRAM(s) IntraMuscular once PRN Agitation  LORazepam     Tablet 2 milliGRAM(s) Oral every 6 hours PRN Agitation  LORazepam   Injectable 3 milliGRAM(s) IntraMuscular Once PRN severe agitation  nicotine  Polacrilex Gum 4 milliGRAM(s) Oral every 2 hours PRN nicotine dependence  OLANZapine Injectable 10 milliGRAM(s) IntraMuscular once PRN severe agitation  
MEDICATIONS  (PRN):  acetaminophen     Tablet .. 650 milliGRAM(s) Oral every 6 hours PRN Temp greater or equal to 38C (100.4F), Mild Pain (1 - 3), Moderate Pain (4 - 6)  diphenhydrAMINE 50 milliGRAM(s) Oral every 6 hours PRN Extrapyramidal prophylaxis  diphenhydrAMINE Injectable 50 milliGRAM(s) IntraMuscular once PRN Extrapyramidal prophylaxis  LORazepam     Tablet 2 milliGRAM(s) Oral four times a day PRN Agitation  LORazepam   Injectable 3 milliGRAM(s) IntraMuscular Once PRN severe agitation  nicotine  Polacrilex Gum 4 milliGRAM(s) Oral every 2 hours PRN nicotine dependence  OLANZapine Injectable 10 milliGRAM(s) IntraMuscular once PRN severe agitation  risperiDONE  Disintegrating Tablet 2 milliGRAM(s) Oral two times a day PRN agitation  
MEDICATIONS  (PRN):  acetaminophen     Tablet .. 650 milliGRAM(s) Oral every 6 hours PRN Temp greater or equal to 38C (100.4F), Mild Pain (1 - 3), Moderate Pain (4 - 6)  diphenhydrAMINE 50 milliGRAM(s) Oral every 6 hours PRN Extrapyramidal prophylaxis  diphenhydrAMINE Injectable 50 milliGRAM(s) IntraMuscular once PRN Extrapyramidal prophylaxis  ibuprofen  Tablet. 400 milliGRAM(s) Oral once PRN Mild Pain (1 - 3), Moderate Pain (4 - 6)  LORazepam     Tablet 2 milliGRAM(s) Oral four times a day PRN Agitation  nicotine  Polacrilex Gum 4 milliGRAM(s) Oral every 2 hours PRN nicotine dependence  OLANZapine Injectable 20 milliGRAM(s) IntraMuscular every 12 hours PRN agitation  risperiDONE  Disintegrating Tablet 2 milliGRAM(s) Oral two times a day PRN agitation  
MEDICATIONS  (PRN):  acetaminophen     Tablet .. 650 milliGRAM(s) Oral every 6 hours PRN Temp greater or equal to 38C (100.4F), Mild Pain (1 - 3), Moderate Pain (4 - 6)  LORazepam     Tablet 2 milliGRAM(s) Oral four times a day PRN Agitation  nicotine  Polacrilex Gum 4 milliGRAM(s) Oral every 2 hours PRN nicotine dependence  OLANZapine Injectable 10 milliGRAM(s) IntraMuscular once PRN agitation  
MEDICATIONS  (PRN):  acetaminophen     Tablet .. 650 milliGRAM(s) Oral every 6 hours PRN Temp greater or equal to 38C (100.4F), Mild Pain (1 - 3), Moderate Pain (4 - 6)  haloperidol     Tablet 5 milliGRAM(s) Oral every 4 hours PRN severe agitation  haloperidol    Injectable 5 milliGRAM(s) IntraMuscular once PRN severe agitation  LORazepam     Tablet 2 milliGRAM(s) Oral four times a day PRN Agitation  nicotine  Polacrilex Gum 4 milliGRAM(s) Oral every 2 hours PRN nicotine dependence  OLANZapine Disintegrating Tablet 5 milliGRAM(s) Oral every 4 hours PRN agitation  OLANZapine Injectable 10 milliGRAM(s) IntraMuscular once PRN agitation  sulindac 200 milliGRAM(s) Oral every 12 hours PRN pain  
MEDICATIONS  (PRN):  acetaminophen     Tablet .. 650 milliGRAM(s) Oral every 6 hours PRN Temp greater or equal to 38C (100.4F), Mild Pain (1 - 3), Moderate Pain (4 - 6)  atropine 1% Ophthalmic Solution for SubLingual Use 2 Drop(s) SubLingual two times a day PRN sialorrhea/hypersalivation  haloperidol     Tablet 5 milliGRAM(s) Oral every 4 hours PRN severe agitation  haloperidol    Injectable 5 milliGRAM(s) IntraMuscular once PRN severe agitation  LORazepam     Tablet 2 milliGRAM(s) Oral four times a day PRN Agitation  nicotine  Polacrilex Gum 4 milliGRAM(s) Oral every 2 hours PRN nicotine dependence  OLANZapine Disintegrating Tablet 5 milliGRAM(s) Oral every 4 hours PRN agitation  OLANZapine Injectable 10 milliGRAM(s) IntraMuscular once PRN agitation  
MEDICATIONS  (PRN):  acetaminophen     Tablet .. 650 milliGRAM(s) Oral every 6 hours PRN Temp greater or equal to 38C (100.4F), Mild Pain (1 - 3), Moderate Pain (4 - 6)  haloperidol     Tablet 5 milliGRAM(s) Oral every 4 hours PRN severe agitation  haloperidol    Injectable 5 milliGRAM(s) IntraMuscular once PRN severe agitation  LORazepam     Tablet 2 milliGRAM(s) Oral four times a day PRN Agitation  nicotine  Polacrilex Gum 4 milliGRAM(s) Oral every 2 hours PRN nicotine dependence  OLANZapine Disintegrating Tablet 5 milliGRAM(s) Oral every 4 hours PRN agitation  OLANZapine Injectable 10 milliGRAM(s) IntraMuscular once PRN agitation  sulindac 200 milliGRAM(s) Oral every 12 hours PRN pain  
MEDICATIONS  (PRN):  acetaminophen     Tablet .. 650 milliGRAM(s) Oral every 6 hours PRN Temp greater or equal to 38C (100.4F), Mild Pain (1 - 3), Moderate Pain (4 - 6)  diphenhydrAMINE 50 milliGRAM(s) Oral every 6 hours PRN Extrapyramidal prophylaxis  diphenhydrAMINE Injectable 50 milliGRAM(s) IntraMuscular once PRN Extrapyramidal prophylaxis  LORazepam     Tablet 2 milliGRAM(s) Oral four times a day PRN Agitation  LORazepam   Injectable 3 milliGRAM(s) IntraMuscular Once PRN severe agitation  nicotine  Polacrilex Gum 4 milliGRAM(s) Oral every 2 hours PRN nicotine dependence  OLANZapine Injectable 10 milliGRAM(s) IntraMuscular once PRN severe agitation  risperiDONE  Disintegrating Tablet 2 milliGRAM(s) Oral two times a day PRN agitation  
MEDICATIONS  (PRN):  acetaminophen     Tablet .. 650 milliGRAM(s) Oral every 6 hours PRN Temp greater or equal to 38C (100.4F), Mild Pain (1 - 3), Moderate Pain (4 - 6)  haloperidol     Tablet 5 milliGRAM(s) Oral every 4 hours PRN severe agitation  haloperidol    Injectable 5 milliGRAM(s) IntraMuscular once PRN severe agitation  LORazepam     Tablet 2 milliGRAM(s) Oral four times a day PRN Agitation  nicotine  Polacrilex Gum 4 milliGRAM(s) Oral every 2 hours PRN nicotine dependence  OLANZapine Disintegrating Tablet 5 milliGRAM(s) Oral every 4 hours PRN agitation  OLANZapine Injectable 10 milliGRAM(s) IntraMuscular once PRN agitation  sulindac 200 milliGRAM(s) Oral every 12 hours PRN pain  
MEDICATIONS  (PRN):  acetaminophen     Tablet .. 650 milliGRAM(s) Oral every 6 hours PRN Temp greater or equal to 38C (100.4F), Mild Pain (1 - 3), Moderate Pain (4 - 6)  haloperidol     Tablet 5 milliGRAM(s) Oral every 4 hours PRN severe agitation  haloperidol    Injectable 5 milliGRAM(s) IntraMuscular once PRN severe agitation  LORazepam     Tablet 2 milliGRAM(s) Oral four times a day PRN Agitation  nicotine  Polacrilex Gum 4 milliGRAM(s) Oral every 2 hours PRN nicotine dependence  OLANZapine Disintegrating Tablet 5 milliGRAM(s) Oral every 4 hours PRN agitation  OLANZapine Injectable 10 milliGRAM(s) IntraMuscular once PRN agitation  
MEDICATIONS  (PRN):  diphenhydrAMINE 50 milliGRAM(s) Oral every 6 hours PRN Extrapyramidal prophylaxis  diphenhydrAMINE Injectable 50 milliGRAM(s) IntraMuscular once PRN Extrapyramidal prophylaxis  LORazepam     Tablet 2 milliGRAM(s) Oral four times a day PRN Agitation  LORazepam   Injectable 3 milliGRAM(s) IntraMuscular Once PRN severe agitation  nicotine  Polacrilex Gum 4 milliGRAM(s) Oral every 2 hours PRN nicotine dependence  OLANZapine Injectable 10 milliGRAM(s) IntraMuscular once PRN severe agitation  risperiDONE  Disintegrating Tablet 2 milliGRAM(s) Oral two times a day PRN agitation  
MEDICATIONS  (PRN):  diphenhydrAMINE 50 milliGRAM(s) Oral every 6 hours PRN Extrapyramidal prophylaxis  diphenhydrAMINE Injectable 50 milliGRAM(s) IntraMuscular once PRN Extrapyramidal prophylaxis  haloperidol     Tablet 5 milliGRAM(s) Oral every 6 hours PRN agitation  haloperidol    Injectable 7.5 milliGRAM(s) IntraMuscular once PRN Agitation  LORazepam     Tablet 2 milliGRAM(s) Oral every 6 hours PRN Agitation  LORazepam   Injectable 3 milliGRAM(s) IntraMuscular Once PRN severe agitation  nicotine  Polacrilex Gum 4 milliGRAM(s) Oral every 2 hours PRN nicotine dependence  OLANZapine Injectable 10 milliGRAM(s) IntraMuscular once PRN severe agitation  
MEDICATIONS  (PRN):  acetaminophen     Tablet .. 650 milliGRAM(s) Oral every 6 hours PRN Temp greater or equal to 38C (100.4F), Mild Pain (1 - 3), Moderate Pain (4 - 6)  chlorproMAZINE    Injectable 50 milliGRAM(s) IntraMuscular once PRN combative behavior  chlorproMAZINE    Tablet 50 milliGRAM(s) Oral every 4 hours PRN agitation  diphenhydrAMINE 50 milliGRAM(s) Oral every 6 hours PRN Extrapyramidal prophylaxis  diphenhydrAMINE Injectable 50 milliGRAM(s) IntraMuscular once PRN Extrapyramidal prophylaxis  LORazepam     Tablet 2 milliGRAM(s) Oral four times a day PRN Agitation  nicotine  Polacrilex Gum 4 milliGRAM(s) Oral every 2 hours PRN nicotine dependence  
MEDICATIONS  (PRN):  acetaminophen     Tablet .. 650 milliGRAM(s) Oral every 6 hours PRN Temp greater or equal to 38C (100.4F), Mild Pain (1 - 3), Moderate Pain (4 - 6)  chlorproMAZINE    Injectable 50 milliGRAM(s) IntraMuscular once PRN combative behavior  chlorproMAZINE    Injectable 50 milliGRAM(s) IntraMuscular once PRN combative behavior  chlorproMAZINE    Tablet 50 milliGRAM(s) Oral every 4 hours PRN agitation  diphenhydrAMINE 50 milliGRAM(s) Oral every 6 hours PRN Extrapyramidal prophylaxis  diphenhydrAMINE Injectable 50 milliGRAM(s) IntraMuscular once PRN Extrapyramidal prophylaxis  ibuprofen  Tablet. 400 milliGRAM(s) Oral once PRN Mild Pain (1 - 3), Moderate Pain (4 - 6)  LORazepam     Tablet 2 milliGRAM(s) Oral four times a day PRN Agitation  nicotine  Polacrilex Gum 4 milliGRAM(s) Oral every 2 hours PRN nicotine dependence  OLANZapine Injectable 20 milliGRAM(s) IntraMuscular every 12 hours PRN agitation  
MEDICATIONS  (PRN):  acetaminophen     Tablet .. 650 milliGRAM(s) Oral every 6 hours PRN Temp greater or equal to 38C (100.4F), Mild Pain (1 - 3), Moderate Pain (4 - 6)  haloperidol     Tablet 5 milliGRAM(s) Oral every 4 hours PRN severe agitation  haloperidol    Injectable 5 milliGRAM(s) IntraMuscular once PRN severe agitation  LORazepam     Tablet 2 milliGRAM(s) Oral four times a day PRN Agitation  nicotine  Polacrilex Gum 4 milliGRAM(s) Oral every 2 hours PRN nicotine dependence  OLANZapine Disintegrating Tablet 5 milliGRAM(s) Oral every 4 hours PRN agitation  OLANZapine Injectable 10 milliGRAM(s) IntraMuscular once PRN agitation  sulindac 200 milliGRAM(s) Oral every 12 hours PRN pain  
MEDICATIONS  (PRN):  acetaminophen     Tablet .. 650 milliGRAM(s) Oral every 6 hours PRN Temp greater or equal to 38C (100.4F), Mild Pain (1 - 3), Moderate Pain (4 - 6)  haloperidol     Tablet 5 milliGRAM(s) Oral every 4 hours PRN severe agitation  haloperidol    Injectable 5 milliGRAM(s) IntraMuscular once PRN severe agitation  LORazepam     Tablet 2 milliGRAM(s) Oral four times a day PRN Agitation  nicotine  Polacrilex Gum 4 milliGRAM(s) Oral every 2 hours PRN nicotine dependence  OLANZapine Disintegrating Tablet 5 milliGRAM(s) Oral every 4 hours PRN agitation  OLANZapine Injectable 10 milliGRAM(s) IntraMuscular once PRN agitation  sulindac 200 milliGRAM(s) Oral every 12 hours PRN pain  
MEDICATIONS  (PRN):  acetaminophen     Tablet .. 650 milliGRAM(s) Oral every 6 hours PRN Temp greater or equal to 38C (100.4F), Mild Pain (1 - 3), Moderate Pain (4 - 6)  atropine 1% Ophthalmic Solution for SubLingual Use 2 Drop(s) SubLingual two times a day PRN sialorrhea/hypersalivation  haloperidol     Tablet 5 milliGRAM(s) Oral every 4 hours PRN severe agitation  haloperidol    Injectable 5 milliGRAM(s) IntraMuscular once PRN severe agitation  LORazepam     Tablet 2 milliGRAM(s) Oral four times a day PRN Agitation  nicotine  Polacrilex Gum 4 milliGRAM(s) Oral every 2 hours PRN nicotine dependence  OLANZapine Disintegrating Tablet 5 milliGRAM(s) Oral every 4 hours PRN agitation  OLANZapine Injectable 10 milliGRAM(s) IntraMuscular once PRN agitation  
MEDICATIONS  (PRN):  acetaminophen     Tablet .. 650 milliGRAM(s) Oral every 6 hours PRN Temp greater or equal to 38C (100.4F), Mild Pain (1 - 3), Moderate Pain (4 - 6)  haloperidol     Tablet 5 milliGRAM(s) Oral every 4 hours PRN severe agitation  haloperidol    Injectable 5 milliGRAM(s) IntraMuscular once PRN severe agitation  LORazepam     Tablet 2 milliGRAM(s) Oral four times a day PRN Agitation  nicotine  Polacrilex Gum 4 milliGRAM(s) Oral every 2 hours PRN nicotine dependence  OLANZapine Disintegrating Tablet 5 milliGRAM(s) Oral every 4 hours PRN agitation  OLANZapine Injectable 10 milliGRAM(s) IntraMuscular once PRN agitation  
MEDICATIONS  (PRN):  acetaminophen     Tablet .. 650 milliGRAM(s) Oral every 6 hours PRN Temp greater or equal to 38C (100.4F), Mild Pain (1 - 3), Moderate Pain (4 - 6)  LORazepam     Tablet 2 milliGRAM(s) Oral four times a day PRN Agitation  nicotine  Polacrilex Gum 4 milliGRAM(s) Oral every 2 hours PRN nicotine dependence  OLANZapine Disintegrating Tablet 5 milliGRAM(s) Oral every 4 hours PRN agitation  OLANZapine Injectable 10 milliGRAM(s) IntraMuscular once PRN agitation  
MEDICATIONS  (PRN):  acetaminophen     Tablet .. 650 milliGRAM(s) Oral every 6 hours PRN Temp greater or equal to 38C (100.4F), Mild Pain (1 - 3), Moderate Pain (4 - 6)  haloperidol     Tablet 5 milliGRAM(s) Oral every 4 hours PRN severe agitation  haloperidol    Injectable 5 milliGRAM(s) IntraMuscular once PRN severe agitation  LORazepam     Tablet 2 milliGRAM(s) Oral four times a day PRN Agitation  nicotine  Polacrilex Gum 4 milliGRAM(s) Oral every 2 hours PRN nicotine dependence  OLANZapine Disintegrating Tablet 5 milliGRAM(s) Oral every 4 hours PRN agitation  OLANZapine Injectable 10 milliGRAM(s) IntraMuscular once PRN agitation  
MEDICATIONS  (PRN):  acetaminophen     Tablet .. 650 milliGRAM(s) Oral every 6 hours PRN Temp greater or equal to 38C (100.4F), Mild Pain (1 - 3), Moderate Pain (4 - 6)  diphenhydrAMINE 50 milliGRAM(s) Oral every 6 hours PRN Extrapyramidal prophylaxis  diphenhydrAMINE Injectable 50 milliGRAM(s) IntraMuscular once PRN Extrapyramidal prophylaxis  ibuprofen  Tablet. 400 milliGRAM(s) Oral once PRN Mild Pain (1 - 3), Moderate Pain (4 - 6)  LORazepam     Tablet 2 milliGRAM(s) Oral four times a day PRN Agitation  nicotine  Polacrilex Gum 4 milliGRAM(s) Oral every 2 hours PRN nicotine dependence  OLANZapine Injectable 10 milliGRAM(s) IntraMuscular once PRN severe agitation  risperiDONE  Disintegrating Tablet 2 milliGRAM(s) Oral two times a day PRN agitation  
MEDICATIONS  (PRN):  acetaminophen     Tablet .. 650 milliGRAM(s) Oral every 6 hours PRN Temp greater or equal to 38C (100.4F), Mild Pain (1 - 3), Moderate Pain (4 - 6)  LORazepam     Tablet 2 milliGRAM(s) Oral four times a day PRN Agitation  nicotine  Polacrilex Gum 4 milliGRAM(s) Oral every 2 hours PRN nicotine dependence  OLANZapine Disintegrating Tablet 5 milliGRAM(s) Oral every 4 hours PRN agitation  OLANZapine Injectable 10 milliGRAM(s) IntraMuscular once PRN agitation  
MEDICATIONS  (PRN):  acetaminophen     Tablet .. 650 milliGRAM(s) Oral every 6 hours PRN Temp greater or equal to 38C (100.4F), Mild Pain (1 - 3), Moderate Pain (4 - 6)  haloperidol     Tablet 5 milliGRAM(s) Oral every 4 hours PRN severe agitation  haloperidol    Injectable 5 milliGRAM(s) IntraMuscular once PRN severe agitation  LORazepam     Tablet 2 milliGRAM(s) Oral four times a day PRN Agitation  nicotine  Polacrilex Gum 4 milliGRAM(s) Oral every 2 hours PRN nicotine dependence  OLANZapine Disintegrating Tablet 5 milliGRAM(s) Oral every 4 hours PRN agitation  OLANZapine Injectable 10 milliGRAM(s) IntraMuscular once PRN agitation  sulindac 200 milliGRAM(s) Oral every 12 hours PRN pain  
MEDICATIONS  (PRN):  diphenhydrAMINE 50 milliGRAM(s) Oral every 6 hours PRN Extrapyramidal prophylaxis  diphenhydrAMINE Injectable 50 milliGRAM(s) IntraMuscular once PRN Extrapyramidal prophylaxis  haloperidol     Tablet 5 milliGRAM(s) Oral every 6 hours PRN agitation  haloperidol    Injectable 7.5 milliGRAM(s) IntraMuscular once PRN Agitation  LORazepam     Tablet 2 milliGRAM(s) Oral every 6 hours PRN Agitation  LORazepam   Injectable 3 milliGRAM(s) IntraMuscular Once PRN severe agitation  nicotine  Polacrilex Gum 4 milliGRAM(s) Oral every 2 hours PRN nicotine dependence  OLANZapine Injectable 10 milliGRAM(s) IntraMuscular once PRN severe agitation  
MEDICATIONS  (PRN):  diphenhydrAMINE 50 milliGRAM(s) Oral every 6 hours PRN Extrapyramidal prophylaxis  diphenhydrAMINE Injectable 50 milliGRAM(s) IntraMuscular once PRN Extrapyramidal prophylaxis  haloperidol     Tablet 5 milliGRAM(s) Oral every 6 hours PRN agitation  haloperidol    Injectable 7.5 milliGRAM(s) IntraMuscular once PRN Agitation  LORazepam     Tablet 2 milliGRAM(s) Oral four times a day PRN Agitation  LORazepam   Injectable 3 milliGRAM(s) IntraMuscular Once PRN severe agitation  nicotine  Polacrilex Gum 4 milliGRAM(s) Oral every 2 hours PRN nicotine dependence  OLANZapine 10 milliGRAM(s) Oral three times a day PRN agitation  OLANZapine Injectable 10 milliGRAM(s) IntraMuscular once PRN severe agitation

## 2021-11-23 NOTE — BH INPATIENT PSYCHIATRY PROGRESS NOTE - NSBHROSSYSTEMS_PSY_ALL_CORE
Psychiatric

## 2021-11-23 NOTE — BH INPATIENT PSYCHIATRY PROGRESS NOTE - NSBHMSEAFFQUAL_PSY_A_CORE
Irritable/Unable to assess
Euthymic
Irritable
Irritable/Unable to assess
Irritable
Euthymic
Euthymic
Irritable/Unable to assess
Irritable
Euthymic
Irritable
Euthymic
Irritable
Euthymic
Euthymic
Irritable
Euthymic
Irritable

## 2021-11-23 NOTE — BH INPATIENT PSYCHIATRY PROGRESS NOTE - NSBHMSEJUDGE_PSY_A_CORE
Fair
Poor
Fair
Fair
Poor
Fair
Poor
Fair
Poor
Fair
Poor
Poor
Unable to assess
Poor
Poor

## 2021-11-23 NOTE — BH INPATIENT PSYCHIATRY PROGRESS NOTE - NSBHATTESTSEENBY_PSY_A_CORE
attending Psychiatrist without NP/Trainee
NP without Attending Psychiatrist
NP without Attending Psychiatrist
attending Psychiatrist without NP/Trainee
NP without Attending Psychiatrist
attending Psychiatrist without NP/Trainee
NP without Attending Psychiatrist
attending Psychiatrist without NP/Trainee
attending Psychiatrist without NP/Trainee
NP without Attending Psychiatrist
attending Psychiatrist without NP/Trainee
NP without Attending Psychiatrist
attending Psychiatrist without NP/Trainee
NP without Attending Psychiatrist
attending Psychiatrist without NP/Trainee

## 2021-11-23 NOTE — BH INPATIENT PSYCHIATRY PROGRESS NOTE - NSBHMSEPERCEPT_PSY_A_CORE
No abnormalities/Unable to assess
No abnormalities
No abnormalities/Other
No abnormalities
No abnormalities/Unable to assess
No abnormalities/Other
No abnormalities/Other
No abnormalities/Unable to assess
No abnormalities
No abnormalities/Unable to assess
No abnormalities
No abnormalities/Other
No abnormalities
No abnormalities/Other
No abnormalities/Unable to assess
No abnormalities/Unable to assess

## 2021-11-23 NOTE — BH DISCHARGE NOTE NURSING/SOCIAL WORK/PSYCH REHAB - NSDCPRRECOMMEND_PSY_ALL_CORE
Pt would follow up with [facility] for medication and symptom management; keep utilizing Stop Skill, Anger management workbook, Shadow boxing, basketball.

## 2021-11-23 NOTE — BH INPATIENT PSYCHIATRY PROGRESS NOTE - NSBHMSERECMEM_PSY_A_CORE
Unable to assess
Normal
Unable to assess
Normal
Unable to assess
Normal
Unable to assess
Normal
Unable to assess

## 2021-11-23 NOTE — BH INPATIENT PSYCHIATRY PROGRESS NOTE - NSBHMSEHYG_PSY_A_CORE
Good
Fair
Good
Fair
Fair
Good
Good
Fair
Good
Fair
Good
Fair
Good
Good
Fair
Fair
Good
Fair
Fair
Good
Fair
Fair

## 2021-11-23 NOTE — BH INPATIENT PSYCHIATRY PROGRESS NOTE - NSTXVIOLNTDATEEST_PSY_ALL_CORE
23-Oct-2021
28-Oct-2021
23-Oct-2021
23-Oct-2021
28-Oct-2021
23-Oct-2021
10-Nov-2021
23-Oct-2021
04-Nov-2021
23-Oct-2021

## 2021-11-23 NOTE — BH INPATIENT PSYCHIATRY PROGRESS NOTE - NSDCCRITERIA_PSY_ALL_CORE
patient will be less psychotic and no longer homicidal

## 2021-11-23 NOTE — BH INPATIENT PSYCHIATRY PROGRESS NOTE - NSTXVIOLNTINTERMD_PSY_ALL_CORE
Titrate antipsychotic
Titrate antipsychotic
Titrate antipsychotic   ACT/AOT application
Titrate antipsychotic
Titrate antipsychotic and BOWENS administration  ACT/AOT application  Medication over objection
Titrate antipsychotic
Titrate antipsychotic and BOWENS administration  ACT/AOT application  Medication over objection
Titrate antipsychotic and BOWENS administration  ACT/AOT application  Medication over objection
Titrate antipsychotic
Titrate antipsychotic and BOWENS administration  ACT/AOT application  Medication over objection
Titrate antipsychotic and BOWENS administration  ACT/AOT application  Medication over objection
Titrate antipsychotic
Titrate antipsychotic and BOWENS administration  ACT/AOT application  Medication over objection
Titrate antipsychotic
Titrate antipsychotic and BOWENS administration  ACT/AOT application  Medication over objection
Titrate antipsychotic
Titrate antipsychotic and BOWENS administration  ACT/AOT application  Medication over objection
Titrate antipsychotic and BOWENS administration  ACT/AOT application  Medication over objection
Titrate antipsychotic
Titrate antipsychotic
Titrate antipsychotic and BOWENS administration  ACT/AOT application  Medication over objection
Titrate antipsychotic and BOWENS administration  ACT/AOT application  Medication over objection
Titrate antipsychotic
Titrate antipsychotic and BOWENS administration  ACT/AOT application  Medication over objection
Titrate antipsychotic   ACT/AOT application
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
## 2021-11-23 NOTE — BH INPATIENT PSYCHIATRY PROGRESS NOTE - NSBHMSEBEHAV_PSY_A_CORE
Cooperative
Cooperative
Uncooperative
Cooperative/Hostile
Cooperative
Cooperative
Uncooperative
Cooperative
Uncooperative
Cooperative
Cooperative/Hostile
Cooperative
Uncooperative
Cooperative
Cooperative/Hostile
Uncooperative
Cooperative

## 2021-11-23 NOTE — BH INPATIENT PSYCHIATRY PROGRESS NOTE - NSCGISEVERILLNESS_PSY_ALL_CORE
5 = Markedly ill - intrusive symptoms that distinctly impair social/occupational function or cause intrusive levels of distress
3 = Mildly ill – clearly established symptoms with minimal, if any, distress or difficulty in social and occupational function
4 = Moderately ill – overt symptoms causing noticeable, but modest, functional impairment or distress; symptom level may warrant medication
5 = Markedly ill - intrusive symptoms that distinctly impair social/occupational function or cause intrusive levels of distress
4 = Moderately ill – overt symptoms causing noticeable, but modest, functional impairment or distress; symptom level may warrant medication
5 = Markedly ill - intrusive symptoms that distinctly impair social/occupational function or cause intrusive levels of distress
4 = Moderately ill – overt symptoms causing noticeable, but modest, functional impairment or distress; symptom level may warrant medication
5 = Markedly ill - intrusive symptoms that distinctly impair social/occupational function or cause intrusive levels of distress
7 = Among the most extremely ill patients – pathology drastically interferes in many life functions; may be hospitalized
4 = Moderately ill – overt symptoms causing noticeable, but modest, functional impairment or distress; symptom level may warrant medication
5 = Markedly ill - intrusive symptoms that distinctly impair social/occupational function or cause intrusive levels of distress
5 = Markedly ill - intrusive symptoms that distinctly impair social/occupational function or cause intrusive levels of distress
3 = Mildly ill – clearly established symptoms with minimal, if any, distress or difficulty in social and occupational function
7 = Among the most extremely ill patients – pathology drastically interferes in many life functions; may be hospitalized
5 = Markedly ill - intrusive symptoms that distinctly impair social/occupational function or cause intrusive levels of distress
4 = Moderately ill – overt symptoms causing noticeable, but modest, functional impairment or distress; symptom level may warrant medication
4 = Moderately ill – overt symptoms causing noticeable, but modest, functional impairment or distress; symptom level may warrant medication
5 = Markedly ill - intrusive symptoms that distinctly impair social/occupational function or cause intrusive levels of distress
4 = Moderately ill – overt symptoms causing noticeable, but modest, functional impairment or distress; symptom level may warrant medication
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
## 2021-11-23 NOTE — BH DISCHARGE NOTE NURSING/SOCIAL WORK/PSYCH REHAB - MODE OF TRANSPORTATION
Pt is being picked up by family and returning back home to [id number] [place] [address]/Ambulatory

## 2021-11-23 NOTE — BH INPATIENT PSYCHIATRY PROGRESS NOTE - NSBHPSYCHOLCOGORIENT_PSY_A_CORE
Unable to assess
Oriented to time, place, person, situation
Unable to assess
Oriented to time, place, person, situation
Unable to assess
Oriented to time, place, person, situation
Unable to assess

## 2021-11-23 NOTE — BH DISCHARGE NOTE NURSING/SOCIAL WORK/PSYCH REHAB - DISCHARGE INSTRUCTIONS AFTERCARE APPOINTMENTS
In order to check the location, date, or time of your aftercare appointment, please refer to your Discharge Instructions Document given to you upon leaving the hospital.  If you have lost the instructions please call [phone]

## 2021-11-23 NOTE — BH INPATIENT PSYCHIATRY PROGRESS NOTE - NSTXVIOLNTGOAL_PSY_ALL_CORE
Will demonstrate 2 problem solving strategies to decrease aggressive behavior
Will be able to express understanding of at least one trigger to their aggressive behavior
Will demonstrate 2 problem solving strategies to decrease aggressive behavior
Will demonstrate 2 problem solving strategies to decrease aggressive behavior
Will be able to express understanding of at least one trigger to their aggressive behavior
Will communicate an angry feeling in a controlled manner
Will demonstrate 2 problem solving strategies to decrease aggressive behavior
Will demonstrate 2 problem solving strategies to decrease aggressive behavior
Will communicate an angry feeling in a controlled manner
Will decrease the number/duration/intensity of angry/aggressive outbursts
Will demonstrate 2 problem solving strategies to decrease aggressive behavior
Will decrease the number/duration/intensity of angry/aggressive outbursts
Will demonstrate 2 problem solving strategies to decrease aggressive behavior

## 2021-11-23 NOTE — BH INPATIENT PSYCHIATRY PROGRESS NOTE - NSTXDCOPNOGOAL_PSY_ALL_CORE
Will agree to participate in appropriate outpatient care

## 2021-11-23 NOTE — BH INPATIENT PSYCHIATRY PROGRESS NOTE - MSE OPTIONS
Unstructured MSE
Structured MSE
Unstructured MSE
Structured MSE
Structured MSE
Unstructured MSE
Structured MSE
Unstructured MSE
Structured MSE
Structured MSE
Unstructured MSE
Structured MSE

## 2021-11-23 NOTE — BH INPATIENT PSYCHIATRY PROGRESS NOTE - NSBHMSEATTEN_PSY_A_CORE
Unable to assess
Normal
Unable to assess
Normal
Unable to assess
Unable to assess
Normal
Unable to assess
Unable to assess
Normal
Unable to assess
Normal
Normal
Unable to assess

## 2021-11-23 NOTE — BH INPATIENT PSYCHIATRY PROGRESS NOTE - NSBHMETABOLICLABS_PSY_ALL_CORE
Labs within last 12 months

## 2021-11-23 NOTE — BH INPATIENT PSYCHIATRY PROGRESS NOTE - NSBHMSERELATED_PSY_A_CORE
Good
Fair
Poor
Good
Poor
Fair
Unable to assess
Fair
Poor
Fair
Fair
Poor
Good
Fair
Fair
Good
Good
Fair
Poor
Poor
Fair

## 2021-11-23 NOTE — BH INPATIENT PSYCHIATRY PROGRESS NOTE - NSTXPROBDCOPNO_PSY_ALL_CORE
DISCHARGE ISSUE - NON-ADHERENT WITH OUTPATIENT SERVICES

## 2021-11-23 NOTE — BH INPATIENT PSYCHIATRY DISCHARGE NOTE - VIOLENCE RISK FACTORS:
Feeling of being under threat and being unable to control threat/Violent ideation/threat/speech/Substance abuse/Affective dysregulation/Impulsivity/Lack of insight into violence risk/need for treatment/Noncompliance with treatment/Community stressors that increase the risk of destabilization/Irritability/Elopement history or risk

## 2021-11-23 NOTE — BH INPATIENT PSYCHIATRY PROGRESS NOTE - NSBHLEGALSTATUSDATE_PSY_ALL_CORE
05-Nov-2021 14:21

## 2021-11-23 NOTE — BH INPATIENT PSYCHIATRY PROGRESS NOTE - NSTXDCOPNODATETRGT_PSY_ALL_CORE
01-Nov-2021
15-Nov-2021
15-Nov-2021
08-Nov-2021
22-Nov-2021
22-Nov-2021
01-Nov-2021
15-Nov-2021
01-Nov-2021
01-Nov-2021
08-Nov-2021
01-Nov-2021
08-Nov-2021
22-Nov-2021
30-Nov-2021
08-Nov-2021
08-Nov-2021
01-Nov-2021
15-Nov-2021
22-Nov-2021
22-Nov-2021
01-Nov-2021
15-Nov-2021
22-Nov-2021

## 2021-11-23 NOTE — BH INPATIENT PSYCHIATRY DISCHARGE NOTE - NSDCMRMEDTOKEN_GEN_ALL_CORE_FT
lithium 450 mg oral tablet, extended release: 2 tab(s) orally once a day (at bedtime)   lithium 450 mg oral tablet, extended release: 1 tab(s) orally once a day  OLANZapine 5 mg oral tablet: 1 tab(s) orally once a day (at bedtime)   propranolol 10 mg oral tablet: 1 tab(s) orally 3 times a day   Cheryl Sustenna 117 mg/0.75 mL intramuscular suspension, extended release: 117 milligram(s) intramuscularly every 4 weeks   lithium 450 mg oral tablet, extended release: 2 tab(s) orally once a day (at bedtime)   lithium 450 mg oral tablet, extended release: 1 tab(s) orally once a day  OLANZapine 5 mg oral tablet: 1 tab(s) orally once a day (at bedtime)   propranolol 10 mg oral tablet: 1 tab(s) orally 3 times a day  propranolol 20 mg oral tablet: 1 tab(s) orally 3 times a day

## 2021-11-23 NOTE — BH INPATIENT PSYCHIATRY PROGRESS NOTE - NSICDXBHPRIMARYDX_PSY_ALL_CORE
Paranoid schizophrenia   F20.0  

## 2021-11-23 NOTE — BH INPATIENT PSYCHIATRY PROGRESS NOTE - NSBHMSEAFFRANGE_PSY_A_CORE
Full
Full/Unable to assess
Full
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
## 2021-11-23 NOTE — BH INPATIENT PSYCHIATRY PROGRESS NOTE - NSTREATMENTCERTY_PSY_ALL_CORE

## 2021-11-23 NOTE — BH INPATIENT PSYCHIATRY PROGRESS NOTE - NSBHMSEINSIGHT_PSY_A_CORE
Poor
Fair
Poor
Poor
Unable to assess
Poor

## 2021-11-23 NOTE — BH INPATIENT PSYCHIATRY PROGRESS NOTE - NSTXDCOPNOPROGRES_PSY_ALL_CORE
Worsening
Worsening
Improving
No Change
Improving
No Change
Improving
No Change
Worsening
Improving
Worsening
Worsening
Improving
Improving
Worsening
Worsening
No Change
Improving
No Change

## 2021-11-23 NOTE — BH INPATIENT PSYCHIATRY PROGRESS NOTE - NSBHMSEKNOWHOW_PSY_ALL_CORE
Educational attainment
Vocabulary
Educational attainment
Vocabulary
Vocabulary

## 2021-11-24 RX ORDER — PALIPERIDONE 1.5 MG/1
1 TABLET, EXTENDED RELEASE ORAL
Qty: 0 | Refills: 0 | DISCHARGE

## 2021-11-24 RX ORDER — PROPRANOLOL HCL 160 MG
1 CAPSULE, EXTENDED RELEASE 24HR ORAL
Qty: 0 | Refills: 0 | DISCHARGE

## 2021-11-30 NOTE — SOCIAL WORK POST DISCHARGE FOLLOW UP NOTE - NSBHSWFOLLOWUP_PSY_ALL_CORE_FT
Patient was released by the court and did not consent to outpatient appointment however was agreeable to receive treatment referral options [facility] Patient reported that he will handle discharge on his own. SW has informed the social work department of this as well.

## 2021-12-12 ENCOUNTER — EMERGENCY (EMERGENCY)
Facility: HOSPITAL | Age: 21
LOS: 1 days | Discharge: ANOTHER HOSPITAL | End: 2021-12-12
Attending: EMERGENCY MEDICINE | Admitting: EMERGENCY MEDICINE
Payer: MEDICAID

## 2021-12-12 VITALS
DIASTOLIC BLOOD PRESSURE: 96 MMHG | HEIGHT: 72 IN | TEMPERATURE: 98 F | RESPIRATION RATE: 16 BRPM | HEART RATE: 90 BPM | OXYGEN SATURATION: 100 % | SYSTOLIC BLOOD PRESSURE: 168 MMHG

## 2021-12-12 VITALS
HEART RATE: 88 BPM | TEMPERATURE: 98 F | OXYGEN SATURATION: 100 % | DIASTOLIC BLOOD PRESSURE: 86 MMHG | RESPIRATION RATE: 16 BRPM | SYSTOLIC BLOOD PRESSURE: 152 MMHG

## 2021-12-12 DIAGNOSIS — F20.9 SCHIZOPHRENIA, UNSPECIFIED: ICD-10-CM

## 2021-12-12 DIAGNOSIS — F12.10 CANNABIS ABUSE, UNCOMPLICATED: ICD-10-CM

## 2021-12-12 PROBLEM — Z78.9 OTHER SPECIFIED HEALTH STATUS: Chronic | Status: ACTIVE | Noted: 2021-10-22

## 2021-12-12 LAB
ALBUMIN SERPL ELPH-MCNC: 5.3 G/DL — HIGH (ref 3.3–5)
ALP SERPL-CCNC: 88 U/L — SIGNIFICANT CHANGE UP (ref 40–120)
ALT FLD-CCNC: 30 U/L — SIGNIFICANT CHANGE UP (ref 4–41)
ANION GAP SERPL CALC-SCNC: 16 MMOL/L — HIGH (ref 7–14)
AST SERPL-CCNC: 17 U/L — SIGNIFICANT CHANGE UP (ref 4–40)
BASOPHILS # BLD AUTO: 0.04 K/UL — SIGNIFICANT CHANGE UP (ref 0–0.2)
BASOPHILS NFR BLD AUTO: 0.4 % — SIGNIFICANT CHANGE UP (ref 0–2)
BILIRUB SERPL-MCNC: 0.7 MG/DL — SIGNIFICANT CHANGE UP (ref 0.2–1.2)
BUN SERPL-MCNC: 14 MG/DL — SIGNIFICANT CHANGE UP (ref 7–23)
CALCIUM SERPL-MCNC: 10.2 MG/DL — SIGNIFICANT CHANGE UP (ref 8.4–10.5)
CHLORIDE SERPL-SCNC: 97 MMOL/L — LOW (ref 98–107)
CO2 SERPL-SCNC: 23 MMOL/L — SIGNIFICANT CHANGE UP (ref 22–31)
CREAT SERPL-MCNC: 1.23 MG/DL — SIGNIFICANT CHANGE UP (ref 0.5–1.3)
EOSINOPHIL # BLD AUTO: 0.1 K/UL — SIGNIFICANT CHANGE UP (ref 0–0.5)
EOSINOPHIL NFR BLD AUTO: 1 % — SIGNIFICANT CHANGE UP (ref 0–6)
GLUCOSE SERPL-MCNC: 97 MG/DL — SIGNIFICANT CHANGE UP (ref 70–99)
HCT VFR BLD CALC: 50.9 % — HIGH (ref 39–50)
HGB BLD-MCNC: 17.5 G/DL — HIGH (ref 13–17)
IANC: 7.24 K/UL — SIGNIFICANT CHANGE UP (ref 1.5–8.5)
IMM GRANULOCYTES NFR BLD AUTO: 0.3 % — SIGNIFICANT CHANGE UP (ref 0–1.5)
LITHIUM SERPL-MCNC: 0.1 MMOL/L — LOW (ref 0.6–1.2)
LYMPHOCYTES # BLD AUTO: 1.92 K/UL — SIGNIFICANT CHANGE UP (ref 1–3.3)
LYMPHOCYTES # BLD AUTO: 18.7 % — SIGNIFICANT CHANGE UP (ref 13–44)
MCHC RBC-ENTMCNC: 29 PG — SIGNIFICANT CHANGE UP (ref 27–34)
MCHC RBC-ENTMCNC: 34.4 GM/DL — SIGNIFICANT CHANGE UP (ref 32–36)
MCV RBC AUTO: 84.3 FL — SIGNIFICANT CHANGE UP (ref 80–100)
MONOCYTES # BLD AUTO: 0.95 K/UL — HIGH (ref 0–0.9)
MONOCYTES NFR BLD AUTO: 9.2 % — SIGNIFICANT CHANGE UP (ref 2–14)
NEUTROPHILS # BLD AUTO: 7.24 K/UL — SIGNIFICANT CHANGE UP (ref 1.8–7.4)
NEUTROPHILS NFR BLD AUTO: 70.4 % — SIGNIFICANT CHANGE UP (ref 43–77)
NRBC # BLD: 0 /100 WBCS — SIGNIFICANT CHANGE UP
NRBC # FLD: 0 K/UL — SIGNIFICANT CHANGE UP
PLATELET # BLD AUTO: 195 K/UL — SIGNIFICANT CHANGE UP (ref 150–400)
POTASSIUM SERPL-MCNC: 4 MMOL/L — SIGNIFICANT CHANGE UP (ref 3.5–5.3)
POTASSIUM SERPL-SCNC: 4 MMOL/L — SIGNIFICANT CHANGE UP (ref 3.5–5.3)
PROT SERPL-MCNC: 7.4 G/DL — SIGNIFICANT CHANGE UP (ref 6–8.3)
RBC # BLD: 6.04 M/UL — HIGH (ref 4.2–5.8)
RBC # FLD: 12.4 % — SIGNIFICANT CHANGE UP (ref 10.3–14.5)
SARS-COV-2 RNA SPEC QL NAA+PROBE: SIGNIFICANT CHANGE UP
SODIUM SERPL-SCNC: 136 MMOL/L — SIGNIFICANT CHANGE UP (ref 135–145)
TOXICOLOGY SCREEN, DRUGS OF ABUSE, SERUM RESULT: SIGNIFICANT CHANGE UP
TSH SERPL-MCNC: 3.39 UIU/ML — SIGNIFICANT CHANGE UP (ref 0.27–4.2)
WBC # BLD: 10.28 K/UL — SIGNIFICANT CHANGE UP (ref 3.8–10.5)
WBC # FLD AUTO: 10.28 K/UL — SIGNIFICANT CHANGE UP (ref 3.8–10.5)

## 2021-12-12 PROCEDURE — 99284 EMERGENCY DEPT VISIT MOD MDM: CPT | Mod: 25

## 2021-12-12 PROCEDURE — 93010 ELECTROCARDIOGRAM REPORT: CPT

## 2021-12-12 RX ORDER — HALOPERIDOL DECANOATE 100 MG/ML
5 INJECTION INTRAMUSCULAR ONCE
Refills: 0 | Status: COMPLETED | OUTPATIENT
Start: 2021-12-12 | End: 2021-12-12

## 2021-12-12 RX ORDER — DIPHENHYDRAMINE HCL 50 MG
50 CAPSULE ORAL ONCE
Refills: 0 | Status: COMPLETED | OUTPATIENT
Start: 2021-12-12 | End: 2021-12-12

## 2021-12-12 RX ADMIN — HALOPERIDOL DECANOATE 5 MILLIGRAM(S): 100 INJECTION INTRAMUSCULAR at 16:05

## 2021-12-12 RX ADMIN — Medication 50 MILLIGRAM(S): at 16:05

## 2021-12-12 RX ADMIN — Medication 2 MILLIGRAM(S): at 16:06

## 2021-12-12 NOTE — ED BEHAVIORAL HEALTH ASSESSMENT NOTE - LEGAL HISTORY
unable to assess none pending as per [facility]/ Web crims site

## 2021-12-12 NOTE — ED BEHAVIORAL HEALTH ASSESSMENT NOTE - NSBHROSSYSTEMS_PSY_ALL_CORE
he currently denied any headaches, no dizziness, no blurring of vision; no sorethroat; no cough, no fever. no chest pains, no SOB, no palpitations, no abdominal pains, no nausea/ vomiting/ diarrhea, no dysuria, no hesitancy, no arthralgia/ no pruritus. "there is nothing wrong with me", he claims

## 2021-12-12 NOTE — ED BEHAVIORAL HEALTH ASSESSMENT NOTE - PAST PSYCHOTROPIC MEDICATION
Abilify, Risperdal, Invega 9mg daily, propranolol 20mg TID  PER LAST [facility] ADMISSION AND ON DISCHARGE LAST 11/23/2021, CURRENT MEDS LISTED AS FOLLOWS: Lithium 450mg daily, Lithium 900mg HS (for aggression) with Li Level at 0.4 last 11/8/2021, Zyprexa 5mg HS (for psychosis), Propranolol 10 mg TID, and Ativan 0.5mg HS (aggression) Abilify, Risperdal, Invega 9mg daily, Invega sustenna 117mg IM [id number], propranolol 20mg TID  PER LAST [facility] ADMISSION AND ON DISCHARGE LAST 11/23/2021, CURRENT MEDS LISTED AS FOLLOWS: Lithium 450mg daily, Lithium 900mg HS (for aggression) with Li Level at 0.4 last 11/8/2021, Zyprexa 5mg HS (for psychosis), Propranolol 10 mg TID, and Ativan 0.5mg HS (aggression)

## 2021-12-12 NOTE — ED PROVIDER NOTE - PHYSICAL EXAMINATION
Well appearing, well nourished, awake, alert, oriented to person, place, time/situation and in no apparent distress.    Airway patent    Eyes without scleral injection. No jaundice.    Strong pulse.    Respirations unlabored.    Abdomen soft, non-tender, no guarding. No distension.    Spine appears normal, range of motion is not limited, no muscle or joint tenderness.    Alert and oriented, no gross motor or sensory deficits.    Skin normal color for race, warm, dry and intact. No evidence of rash.
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
## 2021-12-12 NOTE — ED BEHAVIORAL HEALTH ASSESSMENT NOTE - THOUGHT PROCESS
Thought blocking/Illogical/Disorganized Thought blocking/Illogical Illogical/Impaired reasoning/Disorganized

## 2021-12-12 NOTE — ED BEHAVIORAL HEALTH ASSESSMENT NOTE - DESCRIPTION
liver hemangiomas noted to be tenuous, clenching at one point, unpredictable and superficially cooperative. service was able to verbally redirect and encourage him to have blood samples drawn and a nasal swab perform - amidst multitude of security officers.     Vital Signs Last 24 Hrs  T(C): 36.7 (12 Dec 2021 11:24), Max: 36.7 (12 Dec 2021 11:24)  T(F): 98.1 (12 Dec 2021 11:24), Max: 98.1 (12 Dec 2021 11:24)  HR: 90 (12 Dec 2021 11:24) (90 - 90)  BP: 168/96 (12 Dec 2021 11:24) (168/96 - 168/96)  BP(mean): --  RR: 16 (12 Dec 2021 11:24) (16 - 16)  SpO2: 100% (12 Dec 2021 11:24) (100% - 100%) Lives with parents and brother and sister. Dropped out of [employer]. Unemployed. Family came to [place] when patient was 8y. Lives with parents, 25 yr old brother, and an 8 yr old sister. Dropped out of [employer]. currently, Unemployed.. used to work for [id number]. Family came to [place] (from [place] when he was 8 yr old. also lived in [facility].  Likes soccer. liver hemangioma
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
## 2021-12-12 NOTE — ED BEHAVIORAL HEALTH ASSESSMENT NOTE - VIOLENCE RISK FACTORS:
Feeling of being under threat and being unable to control threat/Violent ideation/threat/speech/Affective dysregulation/Impulsivity/Noncompliance with treatment
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
## 2021-12-12 NOTE — ED PROVIDER NOTE - NSICDXPASTMEDICALHX_GEN_ALL_CORE_FT
PAST MEDICAL HISTORY:  Liver hemangioma     No pertinent past medical history     Schizophrenia     Schizophrenia

## 2021-12-12 NOTE — ED ADULT NURSE NOTE - NSICDXFAMILYHX_GEN_ALL_CORE_FT
Please enroll patient in [facility] Virtual Care Program   
FAMILY HISTORY:  No pertinent family history in first degree relatives  No pertinent family history in first degree relatives

## 2021-12-12 NOTE — ED PROVIDER NOTE - CLINICAL SUMMARY MEDICAL DECISION MAKING FREE TEXT BOX
David: Paranoid schizophrenia. Not taking meds. Threatening family. Check Psych labs. Psych consult.
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
## 2021-12-12 NOTE — ED BEHAVIORAL HEALTH ASSESSMENT NOTE - PSYCHIATRIC ISSUES AND PLAN (INCLUDE STANDING AND PRN MEDICATION)
schizophrenia, aggression -  invega 3mg qhs, propranolol 10mg TID; PRN Ativan/Haldol/Benadryl for agitation Lithium 900mg HS; Zyprexa 5mg HS; Propranolol 10mg TID; Ativan 1mg HS.  PRNs: Haldol 5mg PO/IM + Ativan 2mg PO/IM + Benadryl 50mg PO/IM q6hrs

## 2021-12-12 NOTE — ED BEHAVIORAL HEALTH ASSESSMENT NOTE - OTHER
agitated abruptly tearful at one point unable to assess SANDRA PARRY STOP Reference #: [id number] - no controlled substances prescribed family suspect AH "upset" boarding - no appropriate beds for this Pt boarding - no appropriate beds for this Pt; as of 430PM, Pt is accepted to [facility] med ctr
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
## 2021-12-12 NOTE — ED BEHAVIORAL HEALTH NOTE - BEHAVIORAL HEALTH NOTE
COVID Exposure Screen  1.        *Have you had a COVID-19 test in the last 90 days?  (X) Yes   (  ) No   (  ) Unknown- Reason: _____  IF YES PROCEED TO QUESTION #2. IF NO OR UNKNOWN, PLEASE SKIP TO QUESTION #3.  2.        Date of test(s) and result(s): NEGATIVE, last 11/1/2021  3.        *Have you tested positive for COVID-19 antibodies? (X) Yes   (  ) No   (  ) Unknown- Reason: _____  IF YES PROCEED TO QUESTION #4. IF NO or UNKNOWN, PLEASE SKIP TO QUESTION #5.  4.        Date of positive antibody test: POSITIVE, last 10/23/2021  5.        *Have you received 2 doses of the COVID-19 vaccine? ( ) Yes   (X) No   (  ) Unknown- Reason: _____  IF YES PROCEED TO QUESTION #6. IF NO or UNKNOWN, PLEASE SKIP TO QUESTION #7.  6.        Date of second dose: THE PATIENT GOT 1 DOSE OF J&J, 4/2021  7.        *In the past 10 days, have you been around anyone with a positive COVID-19 test?* (  ) Yes   (X) No   (  ) Unknown- Reason: ____  IF YES PROCEED TO QUESTION #8. IF NO or UNKNOWN, PLEASE SKIP TO QUESTION #13.  8.        Were you within 6 feet of them for at least 15 minutes? (  ) Yes   (  ) No   (  ) Unknown- Reason: _____  9.        Have you provided care for them? (  ) Yes   (  ) No   (  ) Unknown- Reason: ______  10.     Have you had direct physical contact with them (touched, hugged, or kissed them)? (  ) Yes   (  ) No    (  ) Unknown- Reason: _____  11.     Have you shared eating or drinking utensils with them? (  ) Yes   (  ) No    (  ) Unknown- Reason: ____  12.     Have they sneezed, coughed, or somehow gotten respiratory droplets on you? (  ) Yes   (  ) No    (  ) Unknown- Reason: ______  13.     *Have you been out of [place] within the past 10 days?* (  ) Yes   (X) No   (  ) Unknown- Reason: _____  IF YES PLEASE ANSWER THE FOLLOWING QUESTIONS:  14.     Which state/country have you been to? ______  15.     Were you there over 24 hours? (  ) Yes   (  ) No    (  ) Unknown- Reason: ______  16.     Date of return to [facility]: ______.
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
## 2021-12-12 NOTE — ED BEHAVIORAL HEALTH NOTE - BEHAVIORAL HEALTH NOTE
SW contacted [facility], [facility], [facility], [facility] and [facility]. All MALE inpt units are full. Writer continues to seek out inpt tx.

## 2021-12-12 NOTE — ED BEHAVIORAL HEALTH NOTE - BEHAVIORAL HEALTH NOTE
SW outreached Pt's mother Ron Cornejo [phone]. Requested that Writer call back in 10 Minutes. Writer called back 10 min later and no response. Left VM requesting c/b to ERIBERTO w/ contact information provided. SW outreached Pt's mother Ron Cornejo [phone] to obtain collateral. Requested that Writer call back in 10 Minutes. Writer called back 10 min later and no response. Left VM requesting c/b to SW w/ contact information provided. SW outreached Pt's mother Ron Cornejo [phone] to obtain collateral. Requested that Writer call back in 10 Minutes. Writer called back 10 min later and no response. Left VM requesting c/b to SW w/ contact information provided.  Father Tiffany Barron listed at same contact number; ERIBERTO to continue f/u.

## 2021-12-12 NOTE — ED BEHAVIORAL HEALTH ASSESSMENT NOTE - NS ED BHA PLAN ADMIT TO PSYCHIATRY BH CONTACTED FT
Dr. Alonzo, Dr. Cuellar, Dr. Iglesias (1S) notified via email Dr. SHI Iglesias (1S) notified via email
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
## 2021-12-12 NOTE — ED BEHAVIORAL HEALTH NOTE - BEHAVIORAL HEALTH NOTE
Discharge to [facility] - transfer to [facility] for inpt tx    Per provider Dr. Levy MD - pt is clear for d/c to inpt tx at [facility].  Address: [address] [place] [address], [phone]  Accepting attending: Dr Compa Pardo [phone].    SW spoke w/ Yesy Mitchell  in [facility] ([phone]) who provided the following information for admission:  Nurse-to-Nurse contact: Vero FRANCISCO [phone]  UR/ADN telephone # [phone]/ Fax #: [phone]    Face sheet, legals, EKG, labs/Neg. covid-19 & clinicals (2) sent via fax to above fax # [phone] Discharge to [facility] - transfer to [facility] for inpt tx    Per provider Dr. Levy MD - pt is clear for d/c to inpt tx at [facility].  Address: [address] [place] [address], [phone]  Accepting attending: Dr Compa Pardo [phone].    SW spoke w/ eYsy Mitchell  in [facility] ([phone]) who provided the following information for admission:  Nurse-to-Nurse contact/ADN: Vero FRANCISCO [phone]  UR telephone # [phone]/ Fax #: [phone]    Face sheet, legals, EKG, labs/Neg. covid-19 & clinicals (2) sent via fax to above fax # [phone] Discharge to [facility] - transfer to [facility] for inpt tx    Per provider Dr. Levy MD - pt is clear for d/c to inpt tx at [facility].  Address: [address] [place] [address], [phone]  Accepting attending: Dr Compa Pardo [phone].    SW spoke w/ Aracelis Mitchell  in [facility] ([phone]) who provided the following information for admission:  Nurse-to-Nurse contact/ADN: Vero FRANCISCO [phone]  UR telephone # [phone]/ Fax #: [phone]    Face sheet, legals, EKG, labs/Neg. covid-19 & clinicals (2) sent via fax to above fax # [phone]. Confirmed receipt w/ Aracelis.    Was advised by Roger on unit that UR/SW is Bruna Chang who will be in tmrw: [email or web address] / [phone].
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
## 2021-12-13 PROBLEM — Z00.00 ENCOUNTER FOR PREVENTIVE HEALTH EXAMINATION: Status: ACTIVE | Noted: 2021-12-13

## 2021-12-14 NOTE — ED POST DISCHARGE NOTE - REASON FOR FOLLOW-UP
Other COVID-19 AB : detected. Patient contact # [phone] message left with Call Back  P.A. number and hours for return call back.

## 2022-02-16 NOTE — BH INPATIENT PSYCHIATRY PROGRESS NOTE - NSBHLEGALSTATUSNEW_PSY_ALL_CORE
9.27 (2PC)
Name band;
9.27 (2PC)

## 2022-02-25 NOTE — ED ADULT NURSE REASSESSMENT NOTE - NS ED NURSE REASSESS COMMENT FT1
pt calm made aware of admission to [facility] via EMS.
pt irritable & pacing verbal deescalation attempted multiple times pt medicated as per NP rx, safety & comfort measures maintained eval on going.
yes...

## 2022-03-07 ENCOUNTER — OUTPATIENT (OUTPATIENT)
Dept: OUTPATIENT SERVICES | Facility: HOSPITAL | Age: 22
LOS: 1 days | Discharge: HOME | End: 2022-03-07

## 2022-03-24 DIAGNOSIS — F41.1 GENERALIZED ANXIETY DISORDER: ICD-10-CM

## 2022-03-25 ENCOUNTER — APPOINTMENT (OUTPATIENT)
Dept: UROLOGY | Facility: CLINIC | Age: 22
End: 2022-03-25

## 2022-03-25 ENCOUNTER — OUTPATIENT (OUTPATIENT)
Dept: OUTPATIENT SERVICES | Facility: HOSPITAL | Age: 22
LOS: 1 days | End: 2022-03-25
Payer: MEDICAID

## 2022-03-25 DIAGNOSIS — R35.0 FREQUENCY OF MICTURITION: ICD-10-CM

## 2022-03-25 DIAGNOSIS — N52.9 MALE ERECTILE DYSFUNCTION, UNSPECIFIED: ICD-10-CM

## 2022-03-25 PROCEDURE — G0463: CPT

## 2022-03-25 NOTE — PHYSICAL EXAM
[General Appearance - Well Developed] : well developed [General Appearance - Well Nourished] : well nourished [Normal Appearance] : normal appearance [Well Groomed] : well groomed [Edema] : no peripheral edema [] : no respiratory distress [Abdomen Soft] : soft [Abdomen Tenderness] : non-tender [Costovertebral Angle Tenderness] : no ~M costovertebral angle tenderness [Penis Abnormality] : normal circumcised penis [Scrotum] : the scrotum was normal [Normal Station and Gait] : the gait and station were normal for the patient's age [No Focal Deficits] : no focal deficits [Oriented To Time, Place, And Person] : oriented to person, place, and time [FreeTextEntry1] : Small testicle size (~10cc), nontender

## 2022-03-25 NOTE — ASSESSMENT
[FreeTextEntry1] : A/P: 20yo M with Schizophrenia who presents with chief complaint of ED and anejaculation. \par \par - Likely multifactorial with medication-induced and psychosomatic components\par - New Psychiatry appointment on 4/4, will check if any known s/e from current regimen and if possible alternative medication regimen. \par - Will check AM Testosterone\par - Emphasized relaxation techniques and encouraged to focus on practicing mental training (online resources)\par - RTC 4 weeks

## 2022-03-25 NOTE — HISTORY OF PRESENT ILLNESS
[FreeTextEntry1] : Rush MAHER is a 22yo M with PMH Schizophrenia, anxiety (multiple IP admissions for abhorrent behavior/agressiveness) who presents with chief complaint of erectile dysfunction. \par \par History obtained from patient. He states ED started the end of 2020 after event during sexual encounter with female partner where he sudden episode of detumescence. States since then he has had difficulty maintaining an erection. Attributes this change to medication Abilify. Since then has changed medications to multiple  regimens, currently taking Haldol, Depakote, Cogentin. New Psychiatry appointment 4/4/22. \par \par Reports 75% erection ability, denies nocturnal erections, but says is able to masturbate. Other complaint of no ejaculation. Does notice possible semen in urine after orgasm. \par Not currently sexually active, no hx STDs. \par Denies f/c/cp/sob. \par \par

## 2022-03-28 LAB
TESTOST FREE SERPL-MCNC: 17.1 PG/ML
TESTOST SERPL-MCNC: 305 NG/DL

## 2022-04-06 DIAGNOSIS — N52.9 MALE ERECTILE DYSFUNCTION, UNSPECIFIED: ICD-10-CM

## 2022-05-09 ENCOUNTER — OUTPATIENT (OUTPATIENT)
Dept: OUTPATIENT SERVICES | Facility: HOSPITAL | Age: 22
LOS: 1 days | Discharge: STILL ADMITTED OR ADMINISTRATIVE | End: 2022-05-09

## 2022-05-09 DIAGNOSIS — F20.9 SCHIZOPHRENIA, UNSPECIFIED: ICD-10-CM

## 2022-05-09 PROCEDURE — 90839 PSYTX CRISIS INITIAL 60 MIN: CPT | Mod: 1L,95

## 2022-05-16 NOTE — ED ADULT TRIAGE NOTE - SOURCE OF INFORMATION
Chief Complaint  Urinary Tract Infection    Subjective          Fabiana Herbert presents to [facility]  History of Present Illness     Pt presents today for in and out cath due to contaminated specimen x 2. In and out Cath performed in sterile manner-pt tolerated the procedure well. Urine sent off for UA, UA with micro and urine culture.      Patient Care Team:  Kika Stevenson APRN as PCP - General (Nurse Practitioner)   She  has a past medical history of Anemia, Anxiety, Arthritis, Back pain, Cramps, muscle, general, Degenerative lumbar disc (05/04/2014), Difficulty in swallowing, Dizziness, Dysuria (05/24/2019), EKG abnormality (08/12/2016), Fatigue, Headache, Hearing loss, Hyperlipidemia, Hypertension, essential, Hypothyroidism, Imbalance, Incontinence in female, Ingrowing toenail, Insomnia, Iron deficiency anemia (02/01/2017), Joint pain, Leg pain, Lightheadedness (03/14/2018), Low back pain (06/09/2014), Lumbar degenerative disc disease (02/01/2017), Macular degeneration, Mitral valve disorder, Mitral valve replaced (12/07/2017), Nocturia (05/24/2019), Pacemaker (02/23/2018), Peptic ulcer disease (02/01/2017), Ringing in ear, Sciatica (01/09/2015), Shortness of breath, Urethral caruncle (05/24/2019), Vision changes, and Weakness.     Objective   Vital Signs:   There were no vitals taken for this visit.    Physical Exam  Constitutional:       Appearance: Normal appearance.   Cardiovascular:      Rate and Rhythm: Normal rate and regular rhythm.      Pulses: Normal pulses.      Heart sounds: Normal heart sounds.   Pulmonary:      Effort: Pulmonary effort is normal.      Breath sounds: Normal breath sounds.   Genitourinary:     General: Normal vulva.      Exam position: Lithotomy position.      Vagina: Erythema and tenderness present.   Skin:     General: Skin is warm and dry.   Neurological:      General: No focal deficit present.      Mental Status: She is alert and  oriented to person, place, and time.   Psychiatric:         Mood and Affect: Mood normal.         Behavior: Behavior normal.        Result Review :            Past Surgical History:   Procedure Laterality Date   • APPENDECTOMY  1951   • BREAST BIOPSY Left 1955   • CATARACT EXTRACTION WITH INTRAOCULAR LENS IMPLANT Bilateral     1992, 2001   • COLON SURGERY  2018    Dr. Alonso, Moderate Diverticulosis   • COLONOSCOPY     • CYSTOSCOPY  02/18/2020    excision of urethral prolapse w/ Dr. Ibanez   • CYSTOSCOPY  02/18/2020    Excision of Urethral Prolapse w/ Dr. Ibanez   • ENDOSCOPY  2018   • HEMORRHOIDECTOMY      2009/ 2014 hemorrhoid banding    • HYSTERECTOMY  1960    total    • LUMBAR LAMINECTOMY  07/2007   • MITRAL VALVE REPLACEMENT  11/17/2016    Dr. Singh in [place]   • PACEMAKER IMPLANTATION  11/2016    MRI compatible   • WRIST SURGERY Left       Family History   Problem Relation Age of Onset   • Heart disease Father    • Diabetes Father    • Heart disease Sister    • Diabetes Sister    • Heart disease Other    • Heart disease Other    • Colon cancer Neg Hx         Current Outpatient Medications:   •  Bacillus Coagulans-Inulin (Probiotic) 1-250 BILLION-MG capsule, Probiotic 15 billion cell oral capsule take 2 capsules by oral route daily   Active, Disp: , Rfl:   •  estradiol (ESTRACE) 0.1 MG/GM vaginal cream, Insert 1 g into the vagina Daily., Disp: 42.5 g, Rfl: 3  •  hydrocortisone 2.5 % cream, APPLY TO AFFECTED AREA TWICE A DAY AS DIRECTED, Disp: 28 g, Rfl: 3  •  hydrOXYzine (ATARAX) 25 MG tablet, Take 1 tab PO bid prn itching, Disp: 60 tablet, Rfl: 2  •  levothyroxine (SYNTHROID, LEVOTHROID) 50 MCG tablet, Take 1 tablet by mouth Daily., Disp: 90 tablet, Rfl: 1  •  lidocaine (LIDODERM) 5 %, Place 1 patch on the skin as directed by provider Daily. Remove & Discard patch within 12 hours or as directed by MD, Disp: 30 patch, Rfl: 2  •  LORazepam (ATIVAN) 0.5 MG tablet, Take 1 tablet by mouth Daily As Needed for  Anxiety., Disp: 30 tablet, Rfl: 2  •  losartan (COZAAR) 25 MG tablet, Take 1 tablet by mouth Daily., Disp: 90 tablet, Rfl: 1  •  mupirocin (BACTROBAN) 2 % ointment, APPLY SMALL AMOUNT TOPICALLY TO THE AFFECTED AREA THREE TIMES DAILY, Disp: 22 g, Rfl: 1  •  nebivolol (BYSTOLIC) 10 MG tablet, Take 1.5 tablets by mouth Every Night., Disp: 135 tablet, Rfl: 2  •  ondansetron (ZOFRAN) 4 MG tablet, Take 1 tablet by mouth Every 8 (Eight) Hours As Needed for Nausea or Vomiting., Disp: 30 tablet, Rfl: 2  •  polyethylene glycol (MIRALAX) 17 GM/SCOOP powder, 17 g., Disp: , Rfl:   •  Probiotic Product (PROBIOTIC BLEND PO), Probiotic, Disp: , Rfl:   •  spironolactone (ALDACTONE) 25 MG tablet, Take 1 tablet by mouth Every Other Day., Disp: 45 tablet, Rfl: 3   Assessment and Plan    Diagnoses and all orders for this visit:    1. Frequency of urination (Primary)  -     Urine Culture - Urine, Urine, Catheter In/Out; Future  -     Urinalysis With Microscopic - Urine, Catheter; Future  -     Urinalysis With Culture If Indicated - Urine, Catheter In/Out; Future  -     Urine Culture - Urine, Urine, Catheter In/Out  -     Urinalysis With Microscopic - Urine, Catheter  -     Cancel: Urinalysis With Culture If Indicated - Urine, Catheter In/Out    2. Urinary tract infection without hematuria, site unspecified  -     Urine Culture - Urine, Urine, Catheter In/Out; Future  -     Urinalysis With Microscopic - Urine, Catheter; Future  -     Urinalysis With Culture If Indicated - Urine, Catheter In/Out; Future  -     Urine Culture - Urine, Urine, Catheter In/Out  -     Urinalysis With Microscopic - Urine, Catheter  -     Cancel: Urinalysis With Culture If Indicated - Urine, Catheter In/Out        Follow Up   Return if symptoms worsen or fail to improve.  Patient was given instructions and counseling regarding her condition or for health maintenance advice. Please see specific information pulled into the AVS if appropriate.     Fabiana Herbert   reports that she has never smoked. She has never used smokeless tobacco..              JACKLYN Cedillo    The patient is advised to continue current medications.   Patient

## 2022-05-24 NOTE — ED PROVIDER NOTE - DURATION
Patient Education     Reducing Knee Pain and Swelling    Many treatments can help reduce pain and swelling in your knee. Your healthcare provider or physical therapist may suggest one or more of the following treatments:  · Icing your knee helps reduce swelling. You may be asked to ice your knee once a day or more. Apply ice for about 15 to 20 minutes at a time, with at least 40 minutes between sessions. Always keep a towel between the ice and your skin.   · Keeping your leg raised above your heart helps excess fluid flow out of your knee joint. This reduces swelling.  · Compression means wrapping an elastic bandage or neoprene sleeve snugly around your knees. This keeps fluid from collecting in your knee joint.  · Electrical stimulation, done by a physical therapist or , can help reduce excess fluid in your knee joint.  · Anti-inflammatory medicines may be prescribed by your healthcare provider. You may take pills or receive injections in your knee.  · Isometric (alonzo) exercises strengthen the muscles that support your knee joint. They also help reduce excess fluid in your knee.  · Massage helps fluid drain away from your knee.  Date Last Reviewed: 10/13/2015  © 0396-2976 [employer]. [address] [place] [address], [place] [address]. All rights reserved. This information is not intended as a substitute for professional medical care. Always follow your healthcare professional's instructions.            today

## 2022-05-27 NOTE — ED BEHAVIORAL HEALTH ASSESSMENT NOTE - SOURCE OF INFORMATION
Patient Education        Sinusitis: Care Instructions  Your Care Instructions     Sinusitis is an infection of the lining of the sinus cavities in your head. Sinusitis often follows a cold. It causes pain and pressure in your head andface. In most cases, sinusitis gets better on its own in 1 to 2 weeks. But some mildsymptoms may last for several weeks. Sometimes antibiotics are needed. Follow-up care is a key part of your treatment and safety. Be sure to make and go to all appointments, and call your doctor if you are having problems. It's also a good idea to know your test results and keep alist of the medicines you take. How can you care for yourself at home?  Take an over-the-counter pain medicine, such as acetaminophen (Tylenol), ibuprofen (Advil, Motrin), or naproxen (Aleve). Read and follow all instructions on the label.  If the doctor prescribed antibiotics, take them as directed. Do not stop taking them just because you feel better. You need to take the full course of antibiotics.  Be careful when taking over-the-counter cold or flu medicines and Tylenol at the same time. Many of these medicines have acetaminophen, which is Tylenol. Read the labels to make sure that you are not taking more than the recommended dose. Too much acetaminophen (Tylenol) can be harmful.  Breathe warm, moist air from a steamy shower, a hot bath, or a sink filled with hot water. Avoid cold, dry air. Using a humidifier in your home may help. Follow the directions for cleaning the machine.  Use saline (saltwater) nasal washes. This can help keep your nasal passages open and wash out mucus and bacteria. You can buy saline nose drops at a grocery store or drugstore. Or you can make your own at home by adding 1 teaspoon of salt and 1 teaspoon of baking soda to 2 cups of distilled water. If you make your own, fill a bulb syringe with the solution, insert the tip into your nostril, and squeeze gently. Jeri Gloss your nose.    Put a hot, wet towel or a warm gel pack on your face 3 or 4 times a day for 5 to 10 minutes each time.  Try a decongestant nasal spray like oxymetazoline (Afrin). Do not use it for more than 3 days in a row. Using it for more than 3 days can make your congestion worse. When should you call for help? Call your doctor now or seek immediate medical care if:     You have new or worse swelling or redness in your face or around your eyes.      You have a new or higher fever. Watch closely for changes in your health, and be sure to contact your doctor if:     You have new or worse facial pain.      The mucus from your nose becomes thicker (like pus) or has new blood in it.      You are not getting better as expected. Where can you learn more? Go to [email or web address] and sign in to your [employer] account. Enter E771 in the [employer] box to learn more about \"Sinusitis: Care Instructions. \"     If you do not have an account, please click on the \"Sign Up Now\" link. Current as of: September 8, 2021               Content Version: 13.2  © 2006-2022 [employer]wise[employer] Inc[employer]. Care instructions adapted under license by [facility] ([facility]). If you have questions about a medical condition or this instruction, always ask your healthcare professional. [address] any warranty or liability for your use of this information. Patient

## 2022-06-07 NOTE — BH PATIENT PROFILE - LAST ORAL INTAKE
02-Jul-2021 11:57 Protopic Counseling: Patient may experience a mild burning sensation during topical application. Protopic is not approved in children less than 2 years of age. There have been case reports of hematologic and skin malignancies in patients using topical calcineurin inhibitors although causality is questionable.

## 2022-07-28 NOTE — ED BEHAVIORAL HEALTH ASSESSMENT NOTE - RISK ASSESSMENT
No cyanosis, no pallor, no jaundice, no rash The patient is at acutely elevated risk of harm to self and others. In the ED he is acutely agitated, physically aggressive towards staff, making homicidal statements towards his parents and his outpatient psychiatrist. Additional risk factors include hx of schizophrenia, hx of violence/aggression, hx of multiple admissions, male sex, age, medication noncompliance, and poor insight. Protective factors include support from family, connection to outpatient care. Unable to determine Suicide Risk RISK FACTORS:  Modifiable risk factors: psychosis  Unmodifiable risk factors: past hx of aggressive/ violent behavior, Pt reportedly is doing poorly, hx of psychosis, past hx of multiple psych hosps, aggression towards individual,  noncompliance, co-morbid substance use, young male  Protective factors: currently denies (and no objective evidence of) suicidality, no hx of SA or any self injurious behaviors,  no family hx of SA, Domiciled, good support from family/ friends,  no  access to lethal means like guns,  no complex medical issues or chronic pains, not intoxicated or in withdrawal, Anabaptist    At this time given all risks taken into consideration, the Pt is Not at imminent risk for self harm/ suicide but does remain at chronically elevated risk of harming self.  Pt's current presentation is not be deemed dischargeable back to the community.  Current increased in risks can be mitigated by a psychiatric admission with supervision, continuing psych meds with titration towards efficacious dosing range Low Acute Suicide Risk

## 2022-12-01 NOTE — ED BEHAVIORAL HEALTH ASSESSMENT NOTE - RISK ASSESSMENT
High Acute Suicide Risk DASH Diet Risk factors include single, male, unemployed, acute psychosis, med noncompliant, anxiety, hopelessness substance abuse, current suicidal ideation in the context of intentional overdose prior to presentation. Protective factors include stability of domicile, supportive family. Given the above, patient is felt to be at imminent risk of suicide and unsafe for discharge at this time

## 2023-03-07 NOTE — ED BEHAVIORAL HEALTH ASSESSMENT NOTE - NS ED BHA HOMICIDALITY PRESENT AGGRESSION OTHERS LIFETIME
"Chief Complaint  Ear Problem    Subjective    History of Present Illness      Patient presents to [facility] for   History of Present Illness  Pt is here today with c/o ear pressure, that the patient best describes as \"like being a car with the window down. Symptoms have been occurring on and off for 3 days. Pt reports no other symptoms at this time.       Review of Systems    I have reviewed and agree with the HPI and ROS information as above.  Brianda TonaRAJINDER Hall     Objective   Vital Signs:   /71   Pulse 75   Temp 98.4 °F (36.9 °C)   Ht 154.9 cm (61\")   Wt 39 kg (86 lb)   SpO2 98%   BMI 16.25 kg/m²     5 %ile (Z= -1.63) based on CDC (Girls, 2-20 Years) BMI-for-age based on BMI available as of 3/7/2023.     Physical Exam  Constitutional:       Appearance: Normal appearance. She is well-developed.   HENT:      Head: Normocephalic and atraumatic.      Right Ear: Tympanic membrane, ear canal and external ear normal.      Left Ear: Ear canal and external ear normal. Tympanic membrane has decreased mobility.      Ears:      Comments: Fluid and dullness to left TM      Nose: Nose normal. No septal deviation, nasal tenderness or congestion.      Mouth/Throat:      Lips: Pink. No lesions.      Mouth: Mucous membranes are moist. No oral lesions.      Dentition: Normal dentition.      Pharynx: Oropharynx is clear. No pharyngeal swelling, oropharyngeal exudate or posterior oropharyngeal erythema.   Eyes:      General: Lids are normal. Vision grossly intact. No scleral icterus.        Right eye: No discharge.         Left eye: No discharge.      Extraocular Movements: Extraocular movements intact.      Conjunctiva/sclera: Conjunctivae normal.      Right eye: Right conjunctiva is not injected.      Left eye: Left conjunctiva is not injected.      Pupils: Pupils are equal, round, and reactive to light.   Neck:      Thyroid: No thyroid mass.      Trachea: Trachea normal. "   Cardiovascular:      Rate and Rhythm: Normal rate and regular rhythm.      Heart sounds: Normal heart sounds. No murmur heard.    No gallop.   Pulmonary:      Effort: Pulmonary effort is normal.      Breath sounds: Normal breath sounds and air entry. No wheezing, rhonchi or rales.   Abdominal:      General: There is no distension.      Palpations: Abdomen is soft. There is no mass.      Tenderness: There is no abdominal tenderness. There is no right CVA tenderness, left CVA tenderness, guarding or rebound.   Musculoskeletal:         General: No tenderness or deformity. Normal range of motion.      Cervical back: Full passive range of motion without pain, normal range of motion and neck supple.      Thoracic back: Normal.      Right lower leg: No edema.      Left lower leg: No edema.   Skin:     General: Skin is warm and dry.      Coloration: Skin is not jaundiced.      Findings: No rash.   Neurological:      Mental Status: She is alert and oriented to person, place, and time.      Sensory: Sensation is intact.      Motor: Motor function is intact.      Coordination: Coordination is intact.      Gait: Gait is intact.      Deep Tendon Reflexes: Reflexes are normal and symmetric.   Psychiatric:         Mood and Affect: Mood and affect normal.         Judgment: Judgment normal.             Result Review  Data Reviewed:                   Assessment and Plan      Diagnoses and all orders for this visit:    1. Non-recurrent acute serous otitis media of left ear (Primary)  -     cefprozil (CEFZIL) 250 MG tablet; Take 1 tablet by mouth 2 (Two) Times a Day for 10 days.  Dispense: 20 tablet; Refill: 0    2. Fluid level behind tympanic membrane of left ear  -     methylPREDNISolone (MEDROL) 4 MG dose pack; Take as directed on package instructions.  Dispense: 21 tablet; Refill: 0  -     fluticasone (FLONASE) 50 MCG/ACT nasal spray; 2 sprays into the nostril(s) as directed by provider Daily.  Dispense: 15.8 mL; Refill: 0    3.  Dysfunction of left eustachian tube  -     methylPREDNISolone (MEDROL) 4 MG dose pack; Take as directed on package instructions.  Dispense: 21 tablet; Refill: 0  -     fluticasone (FLONASE) 50 MCG/ACT nasal spray; 2 sprays into the nostril(s) as directed by provider Daily.  Dispense: 15.8 mL; Refill: 0    Plan:   Treat acutely with steroid pack, cefzil and flonase.         Follow Up   Return if symptoms worsen or fail to improve.  Patient was given instructions and counseling regarding her condition or for health maintenance advice. Please see specific information pulled into the AVS if appropriate.        Yes
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
## 2023-06-15 NOTE — ED PROVIDER NOTE - PSYCHIATRIC MOOD
Nursing team,  Can you try to reach her again and schedule for [facility] in same-day appt.  Needs to initiate PNC.  If unable to reach, please send letter.  Thanks,  Kjersti E Knox, MD     ANXIOUS

## 2023-07-21 NOTE — BH PATIENT PROFILE - NSDASAVERBAL_PSY_ALL_CORE
[Dear  ___] : Dear  [unfilled], [Courtesy Letter:] : I had the pleasure of seeing your patient, [unfilled], in my office today. [Please see my note below.] : Please see my note below. [Consult Closing:] : Thank you very much for allowing me to participate in the care of this patient.  If you have any questions, please do not hesitate to contact me. [Sincerely,] : Sincerely, [FreeTextEntry3] : De Zamarripa MD No

## 2023-09-10 ENCOUNTER — EMERGENCY (EMERGENCY)
Facility: HOSPITAL | Age: 23
LOS: 1 days | Discharge: HOME | End: 2023-09-10
Attending: STUDENT IN AN ORGANIZED HEALTH CARE EDUCATION/TRAINING PROGRAM | Admitting: STUDENT IN AN ORGANIZED HEALTH CARE EDUCATION/TRAINING PROGRAM
Payer: MEDICAID

## 2023-09-10 VITALS
OXYGEN SATURATION: 100 % | HEART RATE: 76 BPM | SYSTOLIC BLOOD PRESSURE: 141 MMHG | RESPIRATION RATE: 18 BRPM | TEMPERATURE: 98 F | DIASTOLIC BLOOD PRESSURE: 100 MMHG

## 2023-09-10 PROCEDURE — 93010 ELECTROCARDIOGRAM REPORT: CPT

## 2023-09-10 PROCEDURE — 99285 EMERGENCY DEPT VISIT HI MDM: CPT

## 2023-09-10 NOTE — ED ADULT NURSE NOTE - OBJECTIVE STATEMENT
23 y/o male, a&ox4, ambulatory, received to Ed for palpitations. Pt endorses palpitations while playing video games prior to arrival, has subsided at this time. 12 lead ECG completed. Past medical history of schizophrenia, endorses compliance with home meds. Airway is patent, speaking in clear and coherent sentences. Denies CP, SOB, dyspnea, or pain at this time. Respirations are even and unlabored, no signs of respiratory distress.

## 2023-09-10 NOTE — ED ADULT TRIAGE NOTE - CHIEF COMPLAINT QUOTE
Pt reporting to the ED for palpitations for 3 hours with R side chest pain. pmh of schizophrenia taking medication as ordered.

## 2023-09-11 VITALS
DIASTOLIC BLOOD PRESSURE: 69 MMHG | SYSTOLIC BLOOD PRESSURE: 117 MMHG | OXYGEN SATURATION: 100 % | RESPIRATION RATE: 16 BRPM | HEART RATE: 85 BPM | TEMPERATURE: 97 F

## 2023-09-11 LAB
ALBUMIN SERPL ELPH-MCNC: 4.1 G/DL — SIGNIFICANT CHANGE UP (ref 3.3–5)
ALP SERPL-CCNC: 43 U/L — SIGNIFICANT CHANGE UP (ref 40–120)
ALT FLD-CCNC: 39 U/L — SIGNIFICANT CHANGE UP (ref 4–41)
ANION GAP SERPL CALC-SCNC: 11 MMOL/L — SIGNIFICANT CHANGE UP (ref 7–14)
ANISOCYTOSIS BLD QL: SLIGHT — SIGNIFICANT CHANGE UP
AST SERPL-CCNC: 28 U/L — SIGNIFICANT CHANGE UP (ref 4–40)
BASOPHILS # BLD AUTO: 0 K/UL — SIGNIFICANT CHANGE UP (ref 0–0.2)
BASOPHILS NFR BLD AUTO: 0 % — SIGNIFICANT CHANGE UP (ref 0–2)
BILIRUB SERPL-MCNC: <0.2 MG/DL — SIGNIFICANT CHANGE UP (ref 0.2–1.2)
BUN SERPL-MCNC: 15 MG/DL — SIGNIFICANT CHANGE UP (ref 7–23)
CALCIUM SERPL-MCNC: 8.8 MG/DL — SIGNIFICANT CHANGE UP (ref 8.4–10.5)
CHLORIDE SERPL-SCNC: 100 MMOL/L — SIGNIFICANT CHANGE UP (ref 98–107)
CO2 SERPL-SCNC: 25 MMOL/L — SIGNIFICANT CHANGE UP (ref 22–31)
CREAT SERPL-MCNC: 1.1 MG/DL — SIGNIFICANT CHANGE UP (ref 0.5–1.3)
EGFR: 97 ML/MIN/1.73M2 — SIGNIFICANT CHANGE UP
EOSINOPHIL # BLD AUTO: 0.14 K/UL — SIGNIFICANT CHANGE UP (ref 0–0.5)
EOSINOPHIL NFR BLD AUTO: 1.8 % — SIGNIFICANT CHANGE UP (ref 0–6)
GIANT PLATELETS BLD QL SMEAR: PRESENT — SIGNIFICANT CHANGE UP
GLUCOSE SERPL-MCNC: 100 MG/DL — HIGH (ref 70–99)
HCT VFR BLD CALC: 44 % — SIGNIFICANT CHANGE UP (ref 39–50)
HGB BLD-MCNC: 15.4 G/DL — SIGNIFICANT CHANGE UP (ref 13–17)
IANC: 3.8 K/UL — SIGNIFICANT CHANGE UP (ref 1.8–7.4)
LYMPHOCYTES # BLD AUTO: 1.82 K/UL — SIGNIFICANT CHANGE UP (ref 1–3.3)
LYMPHOCYTES # BLD AUTO: 24.3 % — SIGNIFICANT CHANGE UP (ref 13–44)
MAGNESIUM SERPL-MCNC: 2.1 MG/DL — SIGNIFICANT CHANGE UP (ref 1.6–2.6)
MANUAL SMEAR VERIFICATION: SIGNIFICANT CHANGE UP
MCHC RBC-ENTMCNC: 29.1 PG — SIGNIFICANT CHANGE UP (ref 27–34)
MCHC RBC-ENTMCNC: 35 GM/DL — SIGNIFICANT CHANGE UP (ref 32–36)
MCV RBC AUTO: 83.2 FL — SIGNIFICANT CHANGE UP (ref 80–100)
MONOCYTES # BLD AUTO: 0.81 K/UL — SIGNIFICANT CHANGE UP (ref 0–0.9)
MONOCYTES NFR BLD AUTO: 10.8 % — SIGNIFICANT CHANGE UP (ref 2–14)
NEUTROPHILS # BLD AUTO: 4.27 K/UL — SIGNIFICANT CHANGE UP (ref 1.8–7.4)
NEUTROPHILS NFR BLD AUTO: 55.9 % — SIGNIFICANT CHANGE UP (ref 43–77)
NEUTS BAND # BLD: 0.9 % — SIGNIFICANT CHANGE UP (ref 0–6)
PLAT MORPH BLD: NORMAL — SIGNIFICANT CHANGE UP
PLATELET # BLD AUTO: 120 K/UL — LOW (ref 150–400)
PLATELET COUNT - ESTIMATE: ABNORMAL
POIKILOCYTOSIS BLD QL AUTO: SLIGHT — SIGNIFICANT CHANGE UP
POLYCHROMASIA BLD QL SMEAR: SLIGHT — SIGNIFICANT CHANGE UP
POTASSIUM SERPL-MCNC: 4.4 MMOL/L — SIGNIFICANT CHANGE UP (ref 3.5–5.3)
POTASSIUM SERPL-SCNC: 4.4 MMOL/L — SIGNIFICANT CHANGE UP (ref 3.5–5.3)
PROT SERPL-MCNC: 6.5 G/DL — SIGNIFICANT CHANGE UP (ref 6–8.3)
RBC # BLD: 5.29 M/UL — SIGNIFICANT CHANGE UP (ref 4.2–5.8)
RBC # FLD: 12 % — SIGNIFICANT CHANGE UP (ref 10.3–14.5)
RBC BLD AUTO: ABNORMAL
SMUDGE CELLS # BLD: PRESENT — SIGNIFICANT CHANGE UP
SODIUM SERPL-SCNC: 136 MMOL/L — SIGNIFICANT CHANGE UP (ref 135–145)
TROPONIN T, HIGH SENSITIVITY RESULT: 10 NG/L — SIGNIFICANT CHANGE UP
TSH SERPL-MCNC: 2.76 UIU/ML — SIGNIFICANT CHANGE UP (ref 0.27–4.2)
VARIANT LYMPHS # BLD: 6.3 % — HIGH (ref 0–6)
WBC # BLD: 7.51 K/UL — SIGNIFICANT CHANGE UP (ref 3.8–10.5)
WBC # FLD AUTO: 7.51 K/UL — SIGNIFICANT CHANGE UP (ref 3.8–10.5)

## 2023-09-11 PROCEDURE — 71046 X-RAY EXAM CHEST 2 VIEWS: CPT | Mod: 26

## 2023-09-11 NOTE — ED PROVIDER NOTE - NSFOLLOWUPINSTRUCTIONS_ED_ALL_ED_FT
You were seen in the ER for chest pain. Your lab results were within normal limits.    Please follow up with a cardiologist.  Please follow up with your primary care doctor.    Please return to the ER if you have worsening symptoms including fever, chest pain, shortness of breath, abdominal pain, nausea, vomiting, diarrhea, weakness or lightheadedness/fainting.

## 2023-09-11 NOTE — ED PROVIDER NOTE - OBJECTIVE STATEMENT
22-year-old male with history of schizophrenia presenting with chest pain.  Patient has had palpitations for over a year, no recently having chest pain accompanying the palpitations, right-sided.  Usually occurs at rest.  Currently asymptomatic.  No fever, shortness of breath, abdominal pain, nausea, vomiting, leg swelling, recent travel, history of blood clot. No hx asthma or copd. Pt smokes marijuana, has not smoked for 2w.

## 2023-09-11 NOTE — ED PROVIDER NOTE - PHYSICAL EXAMINATION
General appearance: NAD, conversant, afebrile    Eyes: anicteric sclerae, VIRGIL, EOMI   HENT: Atraumatic; oropharynx clear, MMM and no ulcerations, no pharyngeal erythema or exudate   Neck: Trachea midline; Full range of motion, supple   Pulm: CTA bl, normal respiratory effort and no intercostal retractions, normal work of breathing   CV: RRR, No murmurs, rubs, or gallops.    Abdomen: Soft, non-tender, non-distended; no guarding or rebound   Extremities: No peripheral edema or extremity lymphadenopathy. 5/5 strength in all four extremities.   Skin: Dry, normal temperature, turgor and texture; no rash, ulcers or subcutaneous nodules   Psych: Appropriate affect, cooperative; alert and oriented to person, place and time

## 2023-09-11 NOTE — ED PROVIDER NOTE - ATTENDING CONTRIBUTION TO CARE
I have personally performed a face to face medical and diagnostic evaluation of the patient. I have discussed with and reviewed the Resident's note and agree with the History, ROS, Physical Exam and MDM unless otherwise indicated. A brief summary of my personal evaluation and impression can be found below.    22-year-old male with past medical history of schizophrenia, well-controlled on medications presenting with chief complaint of chest pain right-sided that started at rest while he was chong.  Denies any associated shortness of breath, diaphoresis, nausea, vomiting.  Of note patient did experience palpitations at that time.  States been ongoing for over a year, but has not seen a cardiologist.  No cardiac history of early heart disease that patient is aware of.  Non-smoker.  Currently asymptomatic.  Did not take anything for the pain, but started resolving on its own.  Denies pleurisy.  On exam, vital signs stable no reproducible chest wall tenderness to palpation, no epigastric tenderness to palpation.  Lungs clear to auscultation bilaterally.  Patient without any evidence of arrhythmia or ischemia.  Plan for labs including a troponin and TSH given patient is on medications for schizophrenia that may be causing metabolic syndrome.  Cardiac cause unlikely, if no actionable findings on labs, will give patient follow-up with cardiology for possible Holter monitor if palpitations continue.  Reassess to dispo. Martha Cortés, ED Attending

## 2023-09-11 NOTE — ED PROVIDER NOTE - CLINICAL SUMMARY MEDICAL DECISION MAKING FREE TEXT BOX
22-year-old male with history of schizophrenia presenting with chest pain.  Patient has had palpitations for over a year, no recently having chest pain accompanying the palpitations, right-sided.  Usually occurs at rest.  Currently asymptomatic.  No fever, shortness of breath, abdominal pain, nausea, vomiting, leg swelling, recent travel, history of blood clot. No hx asthma or copd. Pt smokes marijuana, has not smoked for 2w. No family hx of cardiac disease or early sudden death. Exam nonfocal. Low suspicion for acs. EKG nonischemic. Will get labs, CXR, reassess.

## 2023-11-22 NOTE — BH INPATIENT PSYCHIATRY ASSESSMENT NOTE - NS ED BHA HOMICIDALITY PRESENT CURRENT PLAN
WIFE WOULD LIKE TO INFORM COLETTE PT HAS A PACEMAKER PLACED NOW, BUT HE IS HOME AND IS DOING WELL. PT ALSO RECEIVED PLATELETS AT HOSPITAL YESTERDAY- NEEDED BEFORE PROCEDURE.   NEEDS REFILL ON TEMAZEPAM 15MG  [facility] [address]   Yes

## 2023-12-22 NOTE — ED BEHAVIORAL HEALTH ASSESSMENT NOTE - NS ED BHA COCAINE
1687 Quality 226: Preventive Care And Screening: Tobacco Use: Screening And Cessation Intervention: Patient screened for tobacco use and is an ex/non-smoker Quality 111:Pneumonia Vaccination Status For Older Adults: Patient did not receive any pneumococcal conjugate or polysaccharide vaccine on or after their 60th birthday and before the end of the measurement period Detail Level: Detailed Quality 110: Preventive Care And Screening: Influenza Immunization: Influenza Immunization Administered during Influenza season None known

## 2024-04-17 NOTE — ED BEHAVIORAL HEALTH ASSESSMENT NOTE - NSBHPSYCHOLCOGORIENT_PSY_A_CORE

## 2024-08-15 NOTE — ED PROVIDER NOTE - CLINICAL SUMMARY MEDICAL DECISION MAKING FREE TEXT BOX
Subjective   Patient ID: Gilmer Barnard is a 38 y.o. male who presents for Leg Pain (X2 weeks).    HPI   Only has pain when sitting on hard surfaces, sciatica on left side 3 weeks  No pain if taking pressure off of the left side  No pain with laying or standing  Tried ice, heat, tylenol which is not helping  He has been stretching  Has chronic back pain  No imaging of back found in system  Does not wear wallet in back pocket  Started playing ultimate frisbee this Summer    Review of Systems   Musculoskeletal:  Positive for back pain. Negative for gait problem.   Neurological:  Negative for weakness.     Objective   BP 94/64 (BP Location: Left arm, Patient Position: Sitting, BP Cuff Size: Large adult)   Pulse 77   Temp 37 °C (98.6 °F) (Temporal)   Resp 14   Wt 76.7 kg (169 lb)   SpO2 96%   BMI 23.24 kg/m²     Physical Exam  Musculoskeletal:      Lumbar back: Spasms present. No tenderness or bony tenderness. Decreased range of motion. Negative right straight leg raise test and negative left straight leg raise test.      Comments: Negative bilateral SI joint pain  Pain with pressure on left piriformis region  +5/5 muscle strength  Upslip noted on RLE  Left pelvic rotation     Constitutional: Well developed, well nourished, alert and in no acute distress   Eyes: Normal external exam.  Cardiovascular: No peripheral edema.  Pulmonary: No respiratory distress  Skin: Warm, well perfused, normal skin turgor and color.   Neurologic: Cranial nerves II-XII grossly intact.   Psychiatric: Mood calm and affect normal.    Assessment/Plan   Continue home stretches, please refer to attachment. You will need to consistently do this several times a week as a maintenance    Patient declined prednisone and muscle relaxer at this time    Avoid wearing your wallet in your back pockets    Please contact us if your symptoms do not improve      Labs, Urine Tox/UA, EKG  Medical evaluation performed. There is no clinical evidence of intoxication or any acute medical problem requiring immediate intervention. Patient is awaiting psychiatric consultation. Final disposition will be determined by psychiatrist. 20 y/o M  hx Schizophrenia     Labs, Urine Tox/UA, EKG  Medical evaluation performed. There is no clinical evidence of intoxication or any acute medical problem requiring immediate intervention. Patient is awaiting psychiatric consultation. Final disposition will be determined by psychiatrist.
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
## 2024-12-20 NOTE — ED PROVIDER NOTE - CPE EDP ENMT NORM
[Well Nourished] : well nourished [Well Developed] : well developed [Alert] : ~L alert normal... [Active] : active [Normal Breathing Pattern] : normal breathing pattern [No Respiratory Distress] : no respiratory distress [No Allergic Shiners] : no allergic shiners [No Drainage] : no drainage [No Conjunctivitis] : no conjunctivitis [Tympanic Membranes Clear] : tympanic membranes were clear [Nasal Mucosa Non-Edematous] : nasal mucosa non-edematous [No Nasal Drainage] : no nasal drainage [No Polyps] : no polyps [No Sinus Tenderness] : no sinus tenderness [No Oral Pallor] : no oral pallor [No Oral Cyanosis] : no oral cyanosis [Non-Erythematous] : non-erythematous [No Exudates] : no exudates [No Postnasal Drip] : no postnasal drip [No Tonsillar Enlargement] : no tonsillar enlargement [Absence Of Retractions] : absence of retractions [Symmetric] : symmetric [Good Expansion] : good expansion [No Acc Muscle Use] : no accessory muscle use [Good aeration to bases] : good aeration to bases [Equal Breath Sounds] : equal breath sounds bilaterally [No Crackles] : no crackles [No Rhonchi] : no rhonchi [Normal Sinus Rhythm] : normal sinus rhythm [No Heart Murmur] : no heart murmur [Soft, Non-Tender] : soft, non-tender [Non Distended] : was not ~L distended [Full ROM] : full range of motion [Capillary Refill < 2 secs] : capillary refill less than two seconds [No Cyanosis] : no cyanosis [No Kyphoscoliosis] : no kyphoscoliosis [No Contractures] : no contractures [Alert and  Oriented] : alert and oriented [No Abnormal Focal Findings] : no abnormal focal findings [Normal Muscle Tone And Reflexes] : normal muscle tone and reflexes [No Birth Marks] : no birth marks [No Rashes] : no rashes [No Skin Lesions] : no skin lesions [No Wheezing] : no wheezing [FreeTextEntry7] : decreased aeration with exhalation [de-identified] : ?clubbing index digit bilaterally

## 2024-12-24 NOTE — ED ADULT TRIAGE NOTE - MEANS OF ARRIVAL
ambulatory Bed/Stretcher in lowest position, wheels locked, appropriate side rails in place/Call bell, personal items and telephone in reach/Instruct patient to call for assistance before getting out of bed/chair/stretcher/Non-slip footwear applied when patient is off stretcher/[place] to call system/Physically safe environment - no spills, clutter or unnecessary equipment/Purposeful proactive rounding/Room/bathroom lighting operational, light cord in reach
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
## 2025-06-16 NOTE — BH INPATIENT PSYCHIATRY ASSESSMENT NOTE - NSTXMEDICDATEEST_PSY_ALL_CORE
no lesions,  no deformities,  no traumatic injuries,  no significant scars are present,  chest wall non-tender,  no masses present, breathing is unlabored without accessory muscle use, normal breath sounds , breathing is unlabored without accessory muscle use. 14-Aug-2021